# Patient Record
Sex: FEMALE | Race: WHITE | Employment: FULL TIME | URBAN - METROPOLITAN AREA
[De-identification: names, ages, dates, MRNs, and addresses within clinical notes are randomized per-mention and may not be internally consistent; named-entity substitution may affect disease eponyms.]

---

## 2017-02-02 ENCOUNTER — TELEPHONE (OUTPATIENT)
Dept: GENERAL MEDICINE | Facility: HOSPITAL | Age: 62
End: 2017-02-02

## 2017-02-06 ENCOUNTER — PRIOR ORIGINAL RECORDS (OUTPATIENT)
Dept: OTHER | Age: 62
End: 2017-02-06

## 2017-02-06 ENCOUNTER — OFFICE VISIT (OUTPATIENT)
Dept: OBGYN CLINIC | Facility: CLINIC | Age: 62
End: 2017-02-06

## 2017-02-06 ENCOUNTER — APPOINTMENT (OUTPATIENT)
Dept: LAB | Facility: REFERENCE LAB | Age: 62
End: 2017-02-06
Attending: FAMILY MEDICINE
Payer: COMMERCIAL

## 2017-02-06 VITALS
BODY MASS INDEX: 25.48 KG/M2 | TEMPERATURE: 98 F | RESPIRATION RATE: 16 BRPM | WEIGHT: 172 LBS | HEART RATE: 80 BPM | SYSTOLIC BLOOD PRESSURE: 128 MMHG | DIASTOLIC BLOOD PRESSURE: 72 MMHG | HEIGHT: 69 IN

## 2017-02-06 DIAGNOSIS — E03.9 HYPOTHYROIDISM, UNSPECIFIED TYPE: ICD-10-CM

## 2017-02-06 DIAGNOSIS — E55.9 VITAMIN D DEFICIENCY: ICD-10-CM

## 2017-02-06 DIAGNOSIS — I47.9 TACHYCARDIA, PAROXYSMAL (HCC): ICD-10-CM

## 2017-02-06 DIAGNOSIS — H81.11 BPPV (BENIGN PAROXYSMAL POSITIONAL VERTIGO), RIGHT: Primary | ICD-10-CM

## 2017-02-06 DIAGNOSIS — E78.2 MIXED HYPERLIPIDEMIA: ICD-10-CM

## 2017-02-06 DIAGNOSIS — E11.9 TYPE 2 DIABETES MELLITUS WITHOUT COMPLICATION, WITHOUT LONG-TERM CURRENT USE OF INSULIN (HCC): ICD-10-CM

## 2017-02-06 DIAGNOSIS — M16.6 OTHER SECONDARY OSTEOARTHRITIS OF BOTH HIPS: ICD-10-CM

## 2017-02-06 LAB
ALBUMIN SERPL BCP-MCNC: 4.1 G/DL (ref 3.5–4.8)
ALBUMIN/GLOB SERPL: 1.1 {RATIO} (ref 1–2)
ALP SERPL-CCNC: 105 U/L (ref 32–100)
ALT SERPL-CCNC: 45 U/L (ref 14–54)
ANION GAP SERPL CALC-SCNC: 11 MMOL/L (ref 0–18)
AST SERPL-CCNC: 25 U/L (ref 15–41)
BILIRUB SERPL-MCNC: 0.7 MG/DL (ref 0.3–1.2)
BUN SERPL-MCNC: 20 MG/DL (ref 8–20)
BUN/CREAT SERPL: 31.3 (ref 10–20)
CALCIUM SERPL-MCNC: 9.7 MG/DL (ref 8.5–10.5)
CHLORIDE SERPL-SCNC: 102 MMOL/L (ref 95–110)
CHOLEST SERPL-MCNC: 250 MG/DL (ref 110–200)
CO2 SERPL-SCNC: 25 MMOL/L (ref 22–32)
CREAT SERPL-MCNC: 0.64 MG/DL (ref 0.5–1.5)
GLOBULIN PLAS-MCNC: 3.6 G/DL (ref 2.5–3.7)
GLUCOSE SERPL-MCNC: 113 MG/DL (ref 70–99)
HDLC SERPL-MCNC: 69 MG/DL
LDLC SERPL CALC-MCNC: 149 MG/DL (ref 0–99)
NONHDLC SERPL-MCNC: 181 MG/DL
OSMOLALITY UR CALC.SUM OF ELEC: 289 MOSM/KG (ref 275–295)
POTASSIUM SERPL-SCNC: 3.9 MMOL/L (ref 3.3–5.1)
PROT SERPL-MCNC: 7.7 G/DL (ref 5.9–8.4)
SODIUM SERPL-SCNC: 138 MMOL/L (ref 136–144)
T3FREE SERPL-MCNC: 5.05 PG/ML (ref 2.53–4.29)
T4 FREE SERPL-MCNC: 0.69 NG/DL (ref 0.58–1.64)
TRIGL SERPL-MCNC: 159 MG/DL (ref 1–149)
TSH SERPL-ACNC: 0.47 UIU/ML (ref 0.34–5.6)

## 2017-02-06 PROCEDURE — 82306 VITAMIN D 25 HYDROXY: CPT

## 2017-02-06 PROCEDURE — 80053 COMPREHEN METABOLIC PANEL: CPT

## 2017-02-06 PROCEDURE — 84439 ASSAY OF FREE THYROXINE: CPT

## 2017-02-06 PROCEDURE — 80061 LIPID PANEL: CPT

## 2017-02-06 PROCEDURE — 36415 COLL VENOUS BLD VENIPUNCTURE: CPT

## 2017-02-06 PROCEDURE — 84481 FREE ASSAY (FT-3): CPT

## 2017-02-06 PROCEDURE — 83036 HEMOGLOBIN GLYCOSYLATED A1C: CPT

## 2017-02-06 PROCEDURE — 84443 ASSAY THYROID STIM HORMONE: CPT

## 2017-02-06 PROCEDURE — 99203 OFFICE O/P NEW LOW 30 MIN: CPT | Performed by: FAMILY MEDICINE

## 2017-02-06 RX ORDER — GLUCOSAMINE HCL 500 MG
TABLET ORAL
COMMUNITY
End: 2017-02-06 | Stop reason: CLARIF

## 2017-02-06 RX ORDER — THYROID,PORK 90 MG
TABLET ORAL
Refills: 3 | COMMUNITY
Start: 2017-01-06 | End: 2017-02-08

## 2017-02-06 RX ORDER — FLUOXETINE HYDROCHLORIDE 40 MG/1
CAPSULE ORAL
Refills: 3 | COMMUNITY
Start: 2017-01-07 | End: 2018-01-05 | Stop reason: CLARIF

## 2017-02-06 NOTE — PROGRESS NOTES
CC:  Patient presents with:  New Patient: discuss medical problems, having dizziness. HPI: 57year old female with hx of DM, HLD, hypothyroidism, arthritis, and vertigo here to establish care and discuss dizziness.   Notes she had an inner ear issue la rashes  Neuro: vertigo, bilateral tinnitus, no headaches, no weakness     Past Medical History   Diagnosis Date   • Hyperlipidemia    • Diabetes ([address]    • Thyroid disease    • Hypertension    • Pre-diabetes    • Arthritis      hips   • Spinal stenosis supple, left TM retracted, no tonsillar erythema or exudate, no lymphadenopathy, no thyroid masses   Cardio:  RRR, no murmurs, S1, S2  Pulmonary:  Clear bilaterally, good air entry  Dermatologic:  No rashes or lesions  Neuro: CN II-XII intact, 5/5 muscle s pending lab results, sooner as needed.      Darvin Ahumada, DO  02/06/2017  1:44 PM

## 2017-02-07 ENCOUNTER — HOSPITAL ENCOUNTER (OUTPATIENT)
Dept: GENERAL RADIOLOGY | Age: 62
Discharge: HOME | End: 2017-02-07
Attending: FAMILY MEDICINE
Payer: COMMERCIAL

## 2017-02-07 DIAGNOSIS — M16.6 OTHER SECONDARY OSTEOARTHRITIS OF BOTH HIPS: ICD-10-CM

## 2017-02-07 LAB — HBA1C MFR BLD: 5.9 % (ref 4–6)

## 2017-02-07 PROCEDURE — 73521 X-RAY EXAM HIPS BI 2 VIEWS: CPT

## 2017-02-08 ENCOUNTER — TELEPHONE (OUTPATIENT)
Dept: OBGYN CLINIC | Facility: CLINIC | Age: 62
End: 2017-02-08

## 2017-02-08 DIAGNOSIS — E11.9 TYPE 2 DIABETES MELLITUS WITHOUT COMPLICATION, WITHOUT LONG-TERM CURRENT USE OF INSULIN (HCC): ICD-10-CM

## 2017-02-08 DIAGNOSIS — M16.11 PRIMARY OSTEOARTHRITIS OF RIGHT HIP: ICD-10-CM

## 2017-02-08 DIAGNOSIS — M16.12 PRIMARY OSTEOARTHRITIS OF LEFT HIP: ICD-10-CM

## 2017-02-08 DIAGNOSIS — R40.0 DAYTIME SOMNOLENCE: Primary | ICD-10-CM

## 2017-02-08 DIAGNOSIS — E03.9 HYPOTHYROIDISM, UNSPECIFIED TYPE: ICD-10-CM

## 2017-02-08 LAB — 25(OH)D3 SERPL-MCNC: 45.8 NG/ML

## 2017-02-08 RX ORDER — LEVOTHYROXINE AND LIOTHYRONINE 38; 9 UG/1; UG/1
60 TABLET ORAL DAILY
Qty: 60 TABLET | Refills: 1 | Status: SHIPPED | OUTPATIENT
Start: 2017-02-08 | End: 2017-06-14

## 2017-02-08 RX ORDER — ATORVASTATIN CALCIUM 10 MG/1
10 TABLET, FILM COATED ORAL NIGHTLY
Qty: 30 TABLET | Refills: 3 | Status: SHIPPED | OUTPATIENT
Start: 2017-02-08 | End: 2017-06-14

## 2017-02-09 ENCOUNTER — HOSPITAL ENCOUNTER (OUTPATIENT)
Dept: CV DIAGNOSTICS | Facility: HOSPITAL | Age: 62
Discharge: HOME | End: 2017-02-09
Attending: FAMILY MEDICINE
Payer: COMMERCIAL

## 2017-02-09 DIAGNOSIS — I47.9 TACHYCARDIA, PAROXYSMAL (HCC): ICD-10-CM

## 2017-02-09 PROCEDURE — 93227 XTRNL ECG REC<48 HR R&I: CPT | Performed by: INTERNAL MEDICINE

## 2017-02-10 ENCOUNTER — HOSPITAL ENCOUNTER (OUTPATIENT)
Dept: CV DIAGNOSTICS | Facility: HOSPITAL | Age: 62
Discharge: HOME | End: 2017-02-10
Attending: FAMILY MEDICINE
Payer: COMMERCIAL

## 2017-02-10 PROCEDURE — 93225 XTRNL ECG REC<48 HRS REC: CPT

## 2017-02-15 ENCOUNTER — OFFICE VISIT (OUTPATIENT)
Dept: OBGYN CLINIC | Facility: CLINIC | Age: 62
End: 2017-02-15

## 2017-02-15 VITALS
TEMPERATURE: 98 F | SYSTOLIC BLOOD PRESSURE: 134 MMHG | HEIGHT: 69 IN | WEIGHT: 173 LBS | RESPIRATION RATE: 16 BRPM | BODY MASS INDEX: 25.62 KG/M2 | DIASTOLIC BLOOD PRESSURE: 74 MMHG | HEART RATE: 108 BPM

## 2017-02-15 DIAGNOSIS — M16.0 OSTEOARTHRITIS OF BOTH HIPS, UNSPECIFIED OSTEOARTHRITIS TYPE: Primary | ICD-10-CM

## 2017-02-15 DIAGNOSIS — I47.9 TACHYCARDIA, PAROXYSMAL (HCC): ICD-10-CM

## 2017-02-15 PROCEDURE — 99213 OFFICE O/P EST LOW 20 MIN: CPT | Performed by: FAMILY MEDICINE

## 2017-02-15 RX ORDER — NAPROXEN 500 MG/1
500 TABLET ORAL 2 TIMES DAILY WITH MEALS
Qty: 28 TABLET | Refills: 0 | Status: SHIPPED | OUTPATIENT
Start: 2017-02-15 | End: 2017-03-01

## 2017-02-15 NOTE — PROGRESS NOTES
CC:  Patient presents with: Other: discuss heart monitor, needs work note, and having hip pain on both sides      HPI: 57year old female here to discuss Holter monitor, bilateral hip pain with known DJD, and needing note for work.   Notes during the [facility] THYROID) 60 MG Oral Tab Take 1 tablet (60 mg total) by mouth daily. Disp: 60 tablet Rfl: 1   MetFORMIN HCl 500 MG Oral Tab Take 1 tablet (500 mg total) by mouth 2 (two) times daily with meals.  Disp: 180 tablet Rfl: 1   Atorvastatin Calcium 10 MG Oral Tab T provided to allow patient to be on medical leave pending evaluation by Dr. Giuliana Montanez in one week, and also emailed to her boss as requested     2.  Tachycardia, paroxysmal (HCC)    - Holter monitor testing normal, but given patient with paroxysmal tachycardia a

## 2017-03-17 ENCOUNTER — LAB ENCOUNTER (OUTPATIENT)
Dept: LAB | Facility: HOSPITAL | Age: 62
End: 2017-03-17
Attending: ORTHOPAEDIC SURGERY
Payer: COMMERCIAL

## 2017-03-17 ENCOUNTER — PRIOR ORIGINAL RECORDS (OUTPATIENT)
Dept: OTHER | Age: 62
End: 2017-03-17

## 2017-03-17 ENCOUNTER — HOSPITAL ENCOUNTER (OUTPATIENT)
Dept: GENERAL RADIOLOGY | Facility: HOSPITAL | Age: 62
Discharge: HOME | End: 2017-03-17
Attending: ORTHOPAEDIC SURGERY
Payer: COMMERCIAL

## 2017-03-17 ENCOUNTER — MYAURORA ACCOUNT LINK (OUTPATIENT)
Dept: OTHER | Age: 62
End: 2017-03-17

## 2017-03-17 DIAGNOSIS — M25.552 PAIN IN LEFT HIP: ICD-10-CM

## 2017-03-17 DIAGNOSIS — M16.12 UNILATERAL PRIMARY OSTEOARTHRITIS, LEFT HIP: ICD-10-CM

## 2017-03-17 DIAGNOSIS — R26.9 GAIT DISTURBANCE: Primary | ICD-10-CM

## 2017-03-17 DIAGNOSIS — M16.11 UNILATERAL PRIMARY OSTEOARTHRITIS, RIGHT HIP: ICD-10-CM

## 2017-03-17 DIAGNOSIS — M25.551 PAIN IN RIGHT HIP: ICD-10-CM

## 2017-03-17 DIAGNOSIS — Z01.818 PRE-OP TESTING: ICD-10-CM

## 2017-03-17 PROCEDURE — 71020 XR CHEST PA + LAT CHEST (CPT=71020): CPT

## 2017-03-24 LAB
ALBUMIN: 4.1 G/DL
ALKALINE PHOSPHATATE(ALK PHOS): 105 IU/L
ALT (SGPT): 45 U/L
AST (SGOT): 25 U/L
BILIRUBIN TOTAL: 0.7 MG/DL
BUN: 20 MG/DL
CALCIUM: 9.7 MG/DL
CHLORIDE: 102 MEQ/L
CHOLESTEROL, TOTAL: 250 MG/DL
CREATININE, SERUM: 0.64 MG/DL
GLOBULIN: 3.6 G/DL
GLUCOSE: 113 MG/DL
GLUCOSE: 113 MG/DL
HDL CHOLESTEROL: 69 MG/DL
HEMOGLOBIN A1C: 5.9 %
LDL CHOLESTEROL: 149 MG/DL
POTASSIUM, SERUM: 3.9 MEQ/L
PROTEIN, TOTAL: 7.7 G/DL
SGOT (AST): 25 IU/L
SGPT (ALT): 45 IU/L
SODIUM: 138 MEQ/L
THYROID STIMULATING HORMONE: 0.47 MLU/L
TRIGLYCERIDES: 159 MG/DL

## 2017-03-28 ENCOUNTER — MYAURORA ACCOUNT LINK (OUTPATIENT)
Dept: OTHER | Age: 62
End: 2017-03-28

## 2017-03-28 ENCOUNTER — PRIOR ORIGINAL RECORDS (OUTPATIENT)
Dept: OTHER | Age: 62
End: 2017-03-28

## 2017-03-29 ENCOUNTER — OFFICE VISIT (OUTPATIENT)
Dept: FAMILY MEDICINE CLINIC | Facility: CLINIC | Age: 62
End: 2017-03-29

## 2017-03-29 ENCOUNTER — LAB ENCOUNTER (OUTPATIENT)
Dept: LAB | Facility: REFERENCE LAB | Age: 62
End: 2017-03-29
Attending: FAMILY MEDICINE
Payer: COMMERCIAL

## 2017-03-29 ENCOUNTER — PRIOR ORIGINAL RECORDS (OUTPATIENT)
Dept: OTHER | Age: 62
End: 2017-03-29

## 2017-03-29 VITALS
TEMPERATURE: 99 F | HEART RATE: 71 BPM | SYSTOLIC BLOOD PRESSURE: 124 MMHG | OXYGEN SATURATION: 97 % | HEIGHT: 69 IN | WEIGHT: 174 LBS | DIASTOLIC BLOOD PRESSURE: 76 MMHG | BODY MASS INDEX: 25.77 KG/M2 | RESPIRATION RATE: 18 BRPM

## 2017-03-29 DIAGNOSIS — E78.2 MIXED HYPERLIPIDEMIA: ICD-10-CM

## 2017-03-29 DIAGNOSIS — E03.9 HYPOTHYROIDISM, UNSPECIFIED TYPE: ICD-10-CM

## 2017-03-29 DIAGNOSIS — M16.11 UNILATERAL PRIMARY OSTEOARTHRITIS, RIGHT HIP: ICD-10-CM

## 2017-03-29 DIAGNOSIS — F33.41 RECURRENT MAJOR DEPRESSIVE DISORDER, IN PARTIAL REMISSION (HCC): ICD-10-CM

## 2017-03-29 DIAGNOSIS — E11.9 CONTROLLED TYPE 2 DIABETES MELLITUS WITHOUT COMPLICATION, WITHOUT LONG-TERM CURRENT USE OF INSULIN (HCC): ICD-10-CM

## 2017-03-29 DIAGNOSIS — I10 HYPERTENSION WITH GOAL BLOOD PRESSURE LESS THAN 140/90: ICD-10-CM

## 2017-03-29 DIAGNOSIS — M16.0 OSTEOARTHRITIS OF BOTH HIPS, UNSPECIFIED OSTEOARTHRITIS TYPE: ICD-10-CM

## 2017-03-29 DIAGNOSIS — M25.551 PAIN IN RIGHT HIP: ICD-10-CM

## 2017-03-29 DIAGNOSIS — M16.12 UNILATERAL PRIMARY OSTEOARTHRITIS, LEFT HIP: ICD-10-CM

## 2017-03-29 DIAGNOSIS — Z01.818 PREOPERATIVE CLEARANCE: Primary | ICD-10-CM

## 2017-03-29 DIAGNOSIS — I47.9 PAROXYSMAL TACHYCARDIA (HCC): ICD-10-CM

## 2017-03-29 DIAGNOSIS — R26.9 GAIT DISTURBANCE: ICD-10-CM

## 2017-03-29 DIAGNOSIS — M25.552 PAIN IN LEFT HIP: ICD-10-CM

## 2017-03-29 PROBLEM — E78.5 HYPERLIPIDEMIA: Status: ACTIVE | Noted: 2017-03-29

## 2017-03-29 PROBLEM — M16.9 DEGENERATIVE JOINT DISEASE (DJD) OF HIP: Status: ACTIVE | Noted: 2017-03-29

## 2017-03-29 PROBLEM — F32.A DEPRESSION: Status: ACTIVE | Noted: 2017-03-29

## 2017-03-29 LAB
ALBUMIN SERPL BCP-MCNC: 3.8 G/DL (ref 3.5–4.8)
ALP SERPL-CCNC: 99 U/L (ref 32–100)
ALT SERPL-CCNC: 22 U/L (ref 14–54)
ANION GAP SERPL CALC-SCNC: 10 MMOL/L (ref 0–18)
APTT PPP: 25.9 SECONDS (ref 23.2–35.3)
AST SERPL-CCNC: 18 U/L (ref 15–41)
BASOPHILS # BLD: 0 K/UL (ref 0–0.2)
BASOPHILS NFR BLD: 1 %
BILIRUB DIRECT SERPL-MCNC: 0 MG/DL (ref 0–0.2)
BILIRUB SERPL-MCNC: 0.5 MG/DL (ref 0.3–1.2)
BILIRUB UR QL: NEGATIVE
BUN SERPL-MCNC: 14 MG/DL (ref 8–20)
BUN/CREAT SERPL: 23 (ref 10–20)
CALCIUM SERPL-MCNC: 9.8 MG/DL (ref 8.5–10.5)
CHLORIDE SERPL-SCNC: 104 MMOL/L (ref 95–110)
CO2 SERPL-SCNC: 25 MMOL/L (ref 22–32)
COLOR UR: YELLOW
CREAT SERPL-MCNC: 0.61 MG/DL (ref 0.5–1.5)
CRP SERPL-MCNC: 0.6 MG/DL (ref 0–0.9)
EOSINOPHIL # BLD: 0.2 K/UL (ref 0–0.7)
EOSINOPHIL NFR BLD: 2 %
ERYTHROCYTE [DISTWIDTH] IN BLOOD BY AUTOMATED COUNT: 13.1 % (ref 11–15)
ERYTHROCYTE [SEDIMENTATION RATE] IN BLOOD: 9 MM/HR (ref 0–30)
GLUCOSE SERPL-MCNC: 117 MG/DL (ref 70–99)
GLUCOSE UR-MCNC: NEGATIVE MG/DL
HCT VFR BLD AUTO: 44.1 % (ref 35–48)
HGB BLD-MCNC: 14.8 G/DL (ref 12–16)
HGB UR QL STRIP.AUTO: NEGATIVE
KETONES UR-MCNC: NEGATIVE MG/DL
LYMPHOCYTES # BLD: 2.3 K/UL (ref 1–4)
LYMPHOCYTES NFR BLD: 29 %
MCH RBC QN AUTO: 30.8 PG (ref 27–32)
MCHC RBC AUTO-ENTMCNC: 33.7 G/DL (ref 32–37)
MCV RBC AUTO: 91.4 FL (ref 80–100)
MONOCYTES # BLD: 0.5 K/UL (ref 0–1)
MONOCYTES NFR BLD: 6 %
NEUTROPHILS # BLD AUTO: 5 K/UL (ref 1.8–7.7)
NEUTROPHILS NFR BLD: 63 %
NITRITE UR QL STRIP.AUTO: NEGATIVE
OSMOLALITY UR CALC.SUM OF ELEC: 290 MOSM/KG (ref 275–295)
PH UR: 5 [PH] (ref 5–8)
PLATELET # BLD AUTO: 276 K/UL (ref 140–400)
PMV BLD AUTO: 8.5 FL (ref 7.4–10.3)
POTASSIUM SERPL-SCNC: 4.4 MMOL/L (ref 3.3–5.1)
PROT SERPL-MCNC: 7.1 G/DL (ref 5.9–8.4)
PROT UR-MCNC: NEGATIVE MG/DL
RBC # BLD AUTO: 4.82 M/UL (ref 3.7–5.4)
RBC #/AREA URNS AUTO: 2 /HPF
SODIUM SERPL-SCNC: 139 MMOL/L (ref 136–144)
SP GR UR STRIP: 1.02 (ref 1–1.03)
T3FREE SERPL-MCNC: 3.68 PG/ML (ref 2.53–4.29)
T4 FREE SERPL-MCNC: 0.65 NG/DL (ref 0.58–1.64)
TSH SERPL-ACNC: 1.91 UIU/ML (ref 0.34–5.6)
UROBILINOGEN UR STRIP-ACNC: <2
VIT C UR-MCNC: 40 MG/DL
WBC # BLD AUTO: 7.9 K/UL (ref 4–11)
WBC #/AREA URNS AUTO: 4 /HPF

## 2017-03-29 PROCEDURE — 80076 HEPATIC FUNCTION PANEL: CPT

## 2017-03-29 PROCEDURE — 84439 ASSAY OF FREE THYROXINE: CPT

## 2017-03-29 PROCEDURE — 85610 PROTHROMBIN TIME: CPT

## 2017-03-29 PROCEDURE — 85730 THROMBOPLASTIN TIME PARTIAL: CPT

## 2017-03-29 PROCEDURE — 85652 RBC SED RATE AUTOMATED: CPT

## 2017-03-29 PROCEDURE — 84481 FREE ASSAY (FT-3): CPT

## 2017-03-29 PROCEDURE — 87086 URINE CULTURE/COLONY COUNT: CPT

## 2017-03-29 PROCEDURE — 81001 URINALYSIS AUTO W/SCOPE: CPT

## 2017-03-29 PROCEDURE — 36415 COLL VENOUS BLD VENIPUNCTURE: CPT

## 2017-03-29 PROCEDURE — 86140 C-REACTIVE PROTEIN: CPT

## 2017-03-29 PROCEDURE — 99213 OFFICE O/P EST LOW 20 MIN: CPT | Performed by: FAMILY MEDICINE

## 2017-03-29 PROCEDURE — 83036 HEMOGLOBIN GLYCOSYLATED A1C: CPT

## 2017-03-29 PROCEDURE — 80048 BASIC METABOLIC PNL TOTAL CA: CPT

## 2017-03-29 PROCEDURE — 84443 ASSAY THYROID STIM HORMONE: CPT

## 2017-03-29 PROCEDURE — 85025 COMPLETE CBC W/AUTO DIFF WBC: CPT

## 2017-03-29 PROCEDURE — 87641 MR-STAPH DNA AMP PROBE: CPT

## 2017-03-29 RX ORDER — ATORVASTATIN CALCIUM 10 MG/1
10 TABLET, FILM COATED ORAL NIGHTLY
COMMUNITY
Start: 2017-02-18 | End: 2017-06-14

## 2017-03-29 NOTE — PROGRESS NOTES
CC:  Patient presents with:   Follow - Up  Pre-Op Exam: left first 4/17/17 at [facility] with Marika Swann, then will get right hip replacement      HPI: 57year old female here for pre-op physical for left hip replacement on 4/17 followed by right hip a mo Not on file    Alcohol Use: No    Drug Use: No    Sexual Activity: Not on file   Not on file  Other Topics Concern   None on file     Social History Narrative         Current Outpatient Prescriptions:  ONETOUCH ULTRA BLUE In Vitro Strip USE TO TEST ONCE DA lymphadenopathy   Cardio:  RRR, no murmurs, S1, S2  Pulmonary:  Clear bilaterally, good air entry  Dermatologic:  No rashes or lesions    EKG: Normal sinus rhythm, normal intervals, no ST or T wave changes. Normal EKG.     Assessment and Plan: 57year old f months for diabetes visit or sooner as needed.      Caridad Ortega DO  03/29/2017  1:15 PM

## 2017-03-30 LAB
HBA1C MFR BLD: 6 % (ref 4–6)
INR BLD: 1 (ref 0.9–1.2)
MRSA DNA SPEC QL NAA+PROBE: NEGATIVE
PROTHROMBIN TIME: 12.3 SECONDS (ref 11.8–14.5)

## 2017-04-03 ENCOUNTER — TELEPHONE (OUTPATIENT)
Dept: FAMILY MEDICINE CLINIC | Facility: CLINIC | Age: 62
End: 2017-04-03

## 2017-04-03 NOTE — TELEPHONE ENCOUNTER
Faxed H&P, blood work, and EKG to Dr. Kriss Clifton office for pre-surgical clearance for hip replacement surgeries scheduled 4/17 and 5/15.

## 2017-05-16 ENCOUNTER — MED REC SCAN ONLY (OUTPATIENT)
Dept: FAMILY MEDICINE CLINIC | Facility: CLINIC | Age: 62
End: 2017-05-16

## 2017-06-07 ENCOUNTER — APPOINTMENT (OUTPATIENT)
Dept: LAB | Facility: REFERENCE LAB | Age: 62
End: 2017-06-07
Attending: FAMILY MEDICINE
Payer: COMMERCIAL

## 2017-06-07 ENCOUNTER — OFFICE VISIT (OUTPATIENT)
Dept: FAMILY MEDICINE CLINIC | Facility: CLINIC | Age: 62
End: 2017-06-07

## 2017-06-07 ENCOUNTER — HOSPITAL ENCOUNTER (OUTPATIENT)
Dept: GENERAL RADIOLOGY | Age: 62
Discharge: HOME | End: 2017-06-07
Attending: FAMILY MEDICINE
Payer: COMMERCIAL

## 2017-06-07 VITALS
HEART RATE: 80 BPM | SYSTOLIC BLOOD PRESSURE: 110 MMHG | WEIGHT: 172 LBS | RESPIRATION RATE: 16 BRPM | BODY MASS INDEX: 25.48 KG/M2 | HEIGHT: 69 IN | OXYGEN SATURATION: 98 % | DIASTOLIC BLOOD PRESSURE: 68 MMHG

## 2017-06-07 DIAGNOSIS — M54.50 CHRONIC BILATERAL LOW BACK PAIN WITHOUT SCIATICA: ICD-10-CM

## 2017-06-07 DIAGNOSIS — G89.29 CHRONIC BILATERAL LOW BACK PAIN WITHOUT SCIATICA: ICD-10-CM

## 2017-06-07 DIAGNOSIS — Z01.818 PREOPERATIVE CLEARANCE: ICD-10-CM

## 2017-06-07 DIAGNOSIS — M16.11 OSTEOARTHRITIS OF RIGHT HIP, UNSPECIFIED OSTEOARTHRITIS TYPE: ICD-10-CM

## 2017-06-07 DIAGNOSIS — Z01.818 PREOPERATIVE CLEARANCE: Primary | ICD-10-CM

## 2017-06-07 DIAGNOSIS — R14.0 ABDOMINAL BLOATING: ICD-10-CM

## 2017-06-07 DIAGNOSIS — L84 CALLUS OF FOOT: ICD-10-CM

## 2017-06-07 PROCEDURE — 87081 CULTURE SCREEN ONLY: CPT | Performed by: FAMILY MEDICINE

## 2017-06-07 PROCEDURE — 72100 X-RAY EXAM L-S SPINE 2/3 VWS: CPT | Performed by: FAMILY MEDICINE

## 2017-06-07 PROCEDURE — 99213 OFFICE O/P EST LOW 20 MIN: CPT | Performed by: FAMILY MEDICINE

## 2017-06-07 PROCEDURE — 72220 X-RAY EXAM SACRUM TAILBONE: CPT | Performed by: FAMILY MEDICINE

## 2017-06-07 NOTE — PROGRESS NOTES
CC:  Patient presents with:  Medical Clearance (constitutional): 6/21/17 for right hip replacement   Low Back Pain: x1 week ago  Callus: right foot near fifth toe      HPI: 57year old female here for medical clearance for right hip replacement, with acute right foot callus but no plantar warts       Past Medical History   Diagnosis Date   • Hyperlipidemia    • Diabetes ([facility] [address]    • Thyroid disease    • Hypertension    • Arthritis      hips   • Spinal stenosis    • Vertigo    • Depression      Past Surgical Hi Probiotic Product (PROBIOTIC OR) Take by mouth. Disp:  Rfl:    metoprolol Tartrate 25 MG Oral Tab Take 25 mg by mouth 2 (two) times daily. Disp:  Rfl:    aspirin 81 MG Oral Chew Tab Chew by mouth daily.  Disp:  Rfl:    Vitamin B-12 500 MCG Oral Tab Take 5 with H&P  - MRSA Culture Only [E]; Future    2. Osteoarthritis of right hip, unspecified osteoarthritis type    - Proceed with right hip TKA on 6/21 as above and continue post-operative PT as directed     3.  Chronic bilateral low back pain without sciatica

## 2017-06-13 PROBLEM — M47.817 DJD (DEGENERATIVE JOINT DISEASE), LUMBOSACRAL: Status: ACTIVE | Noted: 2017-06-13

## 2017-06-14 ENCOUNTER — TELEPHONE (OUTPATIENT)
Dept: FAMILY MEDICINE CLINIC | Facility: CLINIC | Age: 62
End: 2017-06-14

## 2017-06-14 ENCOUNTER — APPOINTMENT (OUTPATIENT)
Dept: LAB | Facility: REFERENCE LAB | Age: 62
End: 2017-06-14
Attending: FAMILY MEDICINE
Payer: COMMERCIAL

## 2017-06-14 DIAGNOSIS — R14.0 ABDOMINAL BLOATING: ICD-10-CM

## 2017-06-14 DIAGNOSIS — Z80.49 FAMILY HISTORY OF MALIGNANT NEOPLASM OF GENITAL ORGAN: Primary | ICD-10-CM

## 2017-06-14 DIAGNOSIS — Z80.49 FAMILY HISTORY OF MALIGNANT NEOPLASM OF GENITAL ORGAN: ICD-10-CM

## 2017-06-14 PROCEDURE — 36415 COLL VENOUS BLD VENIPUNCTURE: CPT

## 2017-06-14 PROCEDURE — 86304 IMMUNOASSAY TUMOR CA 125: CPT

## 2017-06-14 RX ORDER — THYROID 60 MG
TABLET ORAL
Qty: 90 TABLET | Refills: 3 | Status: SHIPPED | OUTPATIENT
Start: 2017-06-14 | End: 2018-01-17

## 2017-06-14 RX ORDER — ATORVASTATIN CALCIUM 10 MG/1
TABLET, FILM COATED ORAL
Qty: 90 TABLET | Refills: 3 | Status: SHIPPED | OUTPATIENT
Start: 2017-06-14 | End: 2018-04-27

## 2017-06-14 NOTE — TELEPHONE ENCOUNTER
Patient spoke to insurance company and blood test to test for ovarian cancer would be covered and would like to get the test ordered.  Will call patient once lab is ordered

## 2017-06-21 LAB
ALBUMIN: 3.8 G/DL
ALKALINE PHOSPHATATE(ALK PHOS): 99 IU/L
ALT (SGPT): 22 U/L
AST (SGOT): 18 U/L
BILIRUBIN TOTAL: 0.5 MG/DL
BUN: 14 MG/DL
CALCIUM: 9.8 MG/DL
CHLORIDE: 104 MEQ/L
CREATININE, SERUM: 0.61 MG/DL
ESR (SED RATE): 9 MM/HR
FREE T4: 0.65 MG/DL
GLUCOSE: 117 MG/DL
GLUCOSE: 117 MG/DL
HEMATOCRIT: 44.1 %
HEMATOCRIT: 44.1 %
HEMOGLOBIN A1C: 6 %
HEMOGLOBIN: 14.8 G/DL
HEMOGLOBIN: 14.8 G/DL
INR: 1
PLATELETS: 276 K/UL
PLATELETS: 276 K/UL
POTASSIUM, SERUM: 4.4 MEQ/L
PROTEIN, TOTAL: 7.1 G/DL
PROTHROMBIN TIME: 12.3 SECONDS
RED BLOOD COUNT: 4.82 X 10-6/U
SGOT (AST): 18 IU/L
SGPT (ALT): 22 IU/L
SODIUM: 139 MEQ/L
THYROID STIMULATING HORMONE: 1.91 MLU/L
WHITE BLOOD COUNT: 7.9 X 10-3/U

## 2017-07-05 ENCOUNTER — TELEPHONE (OUTPATIENT)
Dept: OBGYN CLINIC | Facility: CLINIC | Age: 62
End: 2017-07-05

## 2017-07-05 NOTE — TELEPHONE ENCOUNTER
Left voicemail for patient notifying her that the exercises for BPPV are called Epley maneuvers and she should do them twice a day for at least 24 hours after her symptoms resolve.  If her symptoms are at all new or different or the exercises are not helpin

## 2017-07-11 ENCOUNTER — MED REC SCAN ONLY (OUTPATIENT)
Dept: FAMILY MEDICINE CLINIC | Facility: CLINIC | Age: 62
End: 2017-07-11

## 2017-08-16 ENCOUNTER — MED REC SCAN ONLY (OUTPATIENT)
Dept: FAMILY MEDICINE CLINIC | Facility: CLINIC | Age: 62
End: 2017-08-16

## 2017-10-03 ENCOUNTER — MED REC SCAN ONLY (OUTPATIENT)
Dept: FAMILY MEDICINE CLINIC | Facility: CLINIC | Age: 62
End: 2017-10-03

## 2017-11-13 ENCOUNTER — TELEPHONE (OUTPATIENT)
Dept: OBGYN CLINIC | Facility: CLINIC | Age: 62
End: 2017-11-13

## 2017-11-13 NOTE — TELEPHONE ENCOUNTER
Patient had both hips replaced in June and April and got a summons for jury duty and needs note to say she is unable to make the jury

## 2017-11-14 NOTE — TELEPHONE ENCOUNTER
Called patient and left her a message to call back and let us know how she would like to receive the letter.

## 2018-01-05 ENCOUNTER — APPOINTMENT (OUTPATIENT)
Dept: LAB | Facility: REFERENCE LAB | Age: 63
End: 2018-01-05
Attending: FAMILY MEDICINE
Payer: COMMERCIAL

## 2018-01-05 ENCOUNTER — OFFICE VISIT (OUTPATIENT)
Dept: FAMILY MEDICINE CLINIC | Facility: CLINIC | Age: 63
End: 2018-01-05

## 2018-01-05 ENCOUNTER — HOSPITAL ENCOUNTER (OUTPATIENT)
Dept: GENERAL RADIOLOGY | Age: 63
Discharge: HOME | End: 2018-01-05
Attending: FAMILY MEDICINE
Payer: COMMERCIAL

## 2018-01-05 VITALS
OXYGEN SATURATION: 97 % | WEIGHT: 180 LBS | HEART RATE: 71 BPM | DIASTOLIC BLOOD PRESSURE: 82 MMHG | BODY MASS INDEX: 26.66 KG/M2 | HEIGHT: 69 IN | SYSTOLIC BLOOD PRESSURE: 138 MMHG

## 2018-01-05 DIAGNOSIS — E03.9 HYPOTHYROIDISM, UNSPECIFIED TYPE: ICD-10-CM

## 2018-01-05 DIAGNOSIS — E78.2 MIXED HYPERLIPIDEMIA: ICD-10-CM

## 2018-01-05 DIAGNOSIS — Z23 NEED FOR VACCINATION: ICD-10-CM

## 2018-01-05 DIAGNOSIS — M25.561 BILATERAL KNEE PAIN: ICD-10-CM

## 2018-01-05 DIAGNOSIS — E11.9 CONTROLLED TYPE 2 DIABETES MELLITUS WITHOUT COMPLICATION, WITHOUT LONG-TERM CURRENT USE OF INSULIN (HCC): ICD-10-CM

## 2018-01-05 DIAGNOSIS — E55.9 VITAMIN D DEFICIENCY: ICD-10-CM

## 2018-01-05 DIAGNOSIS — M25.562 BILATERAL KNEE PAIN: ICD-10-CM

## 2018-01-05 DIAGNOSIS — Z11.59 ENCOUNTER FOR HEPATITIS C SCREENING TEST FOR LOW RISK PATIENT: ICD-10-CM

## 2018-01-05 DIAGNOSIS — I10 HYPERTENSION WITH GOAL BLOOD PRESSURE LESS THAN 140/90: ICD-10-CM

## 2018-01-05 DIAGNOSIS — M25.561 ACUTE PAIN OF BOTH KNEES: Primary | ICD-10-CM

## 2018-01-05 DIAGNOSIS — F33.41 RECURRENT MAJOR DEPRESSIVE DISORDER, IN PARTIAL REMISSION (HCC): ICD-10-CM

## 2018-01-05 DIAGNOSIS — M25.562 ACUTE PAIN OF BOTH KNEES: Primary | ICD-10-CM

## 2018-01-05 LAB
ALBUMIN SERPL BCP-MCNC: 4.4 G/DL (ref 3.5–4.8)
ALBUMIN/GLOB SERPL: 1.4 {RATIO} (ref 1–2)
ALP SERPL-CCNC: 95 U/L (ref 32–100)
ALT SERPL-CCNC: 31 U/L (ref 14–54)
ANION GAP SERPL CALC-SCNC: 9 MMOL/L (ref 0–18)
AST SERPL-CCNC: 22 U/L (ref 15–41)
BILIRUB SERPL-MCNC: 0.5 MG/DL (ref 0.3–1.2)
BUN SERPL-MCNC: 16 MG/DL (ref 8–20)
BUN/CREAT SERPL: 29.6 (ref 10–20)
CALCIUM SERPL-MCNC: 9.9 MG/DL (ref 8.5–10.5)
CHLORIDE SERPL-SCNC: 102 MMOL/L (ref 95–110)
CHOLEST SERPL-MCNC: 299 MG/DL (ref 110–200)
CO2 SERPL-SCNC: 26 MMOL/L (ref 22–32)
CREAT SERPL-MCNC: 0.54 MG/DL (ref 0.5–1.5)
GLOBULIN PLAS-MCNC: 3.1 G/DL (ref 2.5–3.7)
GLUCOSE SERPL-MCNC: 111 MG/DL (ref 70–99)
HDLC SERPL-MCNC: 62 MG/DL
LDLC SERPL CALC-MCNC: 202 MG/DL (ref 0–99)
NONHDLC SERPL-MCNC: 237 MG/DL
OSMOLALITY UR CALC.SUM OF ELEC: 286 MOSM/KG (ref 275–295)
POTASSIUM SERPL-SCNC: 4.4 MMOL/L (ref 3.3–5.1)
PROT SERPL-MCNC: 7.5 G/DL (ref 5.9–8.4)
SODIUM SERPL-SCNC: 137 MMOL/L (ref 136–144)
T3FREE SERPL-MCNC: 3.82 PG/ML (ref 2.53–4.29)
T4 FREE SERPL-MCNC: 0.74 NG/DL (ref 0.58–1.64)
TRIGL SERPL-MCNC: 174 MG/DL (ref 1–149)
TSH SERPL-ACNC: 5.74 UIU/ML (ref 0.45–5.33)

## 2018-01-05 PROCEDURE — 99214 OFFICE O/P EST MOD 30 MIN: CPT | Performed by: FAMILY MEDICINE

## 2018-01-05 PROCEDURE — 80053 COMPREHEN METABOLIC PANEL: CPT | Performed by: FAMILY MEDICINE

## 2018-01-05 PROCEDURE — 73560 X-RAY EXAM OF KNEE 1 OR 2: CPT | Performed by: FAMILY MEDICINE

## 2018-01-05 PROCEDURE — 36415 COLL VENOUS BLD VENIPUNCTURE: CPT | Performed by: FAMILY MEDICINE

## 2018-01-05 PROCEDURE — 82306 VITAMIN D 25 HYDROXY: CPT | Performed by: FAMILY MEDICINE

## 2018-01-05 PROCEDURE — 90732 PPSV23 VACC 2 YRS+ SUBQ/IM: CPT | Performed by: FAMILY MEDICINE

## 2018-01-05 PROCEDURE — 84439 ASSAY OF FREE THYROXINE: CPT | Performed by: FAMILY MEDICINE

## 2018-01-05 PROCEDURE — 86803 HEPATITIS C AB TEST: CPT | Performed by: FAMILY MEDICINE

## 2018-01-05 PROCEDURE — 73565 X-RAY EXAM OF KNEES: CPT | Performed by: FAMILY MEDICINE

## 2018-01-05 PROCEDURE — 83036 HEMOGLOBIN GLYCOSYLATED A1C: CPT | Performed by: FAMILY MEDICINE

## 2018-01-05 PROCEDURE — 90471 IMMUNIZATION ADMIN: CPT | Performed by: FAMILY MEDICINE

## 2018-01-05 PROCEDURE — 80061 LIPID PANEL: CPT | Performed by: FAMILY MEDICINE

## 2018-01-05 PROCEDURE — 84481 FREE ASSAY (FT-3): CPT | Performed by: FAMILY MEDICINE

## 2018-01-05 PROCEDURE — 84443 ASSAY THYROID STIM HORMONE: CPT | Performed by: FAMILY MEDICINE

## 2018-01-05 RX ORDER — FLUOXETINE HYDROCHLORIDE 20 MG/1
20 CAPSULE ORAL 2 TIMES DAILY
Qty: 180 CAPSULE | Refills: 3 | Status: SHIPPED | OUTPATIENT
Start: 2018-01-05 | End: 2018-11-29

## 2018-01-05 RX ORDER — ASPIRIN 81 MG/1
81 TABLET, CHEWABLE ORAL DAILY
Qty: 90 TABLET | Refills: 3 | Status: SHIPPED | OUTPATIENT
Start: 2018-01-05 | End: 2019-01-05

## 2018-01-05 NOTE — PROGRESS NOTES
CC:  Patient presents with:  Medication Follow-Up: needs refills       HPI: 60year old female with history of Type 2 DM, HTN, HLD, and hypothyroidism here for medication follow-up and refills.   Notes severe bilateral knee pain since her hip surgeries, not Rfl: 0   FLUoxetine HCl 40 MG Oral Cap TK 1 C PO QAM Disp:  Rfl: 3   Verapamil HCl  MG Oral Tab CR TK 1 T PO QD Disp:  Rfl: 2   Phytonadione (VITAMIN K OR) Take by mouth. Disp:  Rfl:    Omega-3 Fatty Acids (FISH OIL OR) Take by mouth.  Disp:  Rfl: current use of insulin (HCC)    - Refill of Metformin provided today, up to date with diabetic eye exam  - Need to check microalbumin:Cr ratio at next visit and consider ACE or ARB if microalbuminuria present  - Continue Metformin 1000 mg twice daily pendi

## 2018-01-06 LAB — HBA1C MFR BLD: 6.1 % (ref 4–6)

## 2018-01-08 LAB — HCV AB SERPL QL IA: NONREACTIVE

## 2018-01-10 LAB — 25(OH)D3 SERPL-MCNC: 23.2 NG/ML

## 2018-01-17 ENCOUNTER — PATIENT MESSAGE (OUTPATIENT)
Dept: FAMILY MEDICINE CLINIC | Facility: CLINIC | Age: 63
End: 2018-01-17

## 2018-01-17 DIAGNOSIS — E03.9 HYPOTHYROIDISM, UNSPECIFIED TYPE: Primary | ICD-10-CM

## 2018-01-17 RX ORDER — LEVOTHYROXINE AND LIOTHYRONINE 38; 9 UG/1; UG/1
TABLET ORAL
Qty: 90 TABLET | Refills: 0 | Status: SHIPPED | OUTPATIENT
Start: 2018-01-17 | End: 2018-04-10

## 2018-01-17 RX ORDER — LEVOTHYROXINE AND LIOTHYRONINE 9.5; 2.25 UG/1; UG/1
TABLET ORAL
Qty: 90 TABLET | Refills: 0 | Status: SHIPPED | OUTPATIENT
Start: 2018-01-17 | End: 2018-04-10

## 2018-01-18 NOTE — TELEPHONE ENCOUNTER
From: Lavell Sylvester  To: Chelsea Russo DO  Sent: 1/17/2018 12:01 PM CST  Subject: Prescription Question    Hi Dr. Dina Art:  After reading the results of my tests, here are my thought:  1) Knees will be put on a back burner until such time as they are inter

## 2018-04-10 RX ORDER — THYROID,PORK 15 MG
TABLET ORAL
Qty: 30 TABLET | Refills: 0 | Status: SHIPPED | OUTPATIENT
Start: 2018-04-10 | End: 2018-04-30

## 2018-04-10 RX ORDER — THYROID 60 MG
TABLET ORAL
Qty: 30 TABLET | Refills: 0 | Status: SHIPPED | OUTPATIENT
Start: 2018-04-10 | End: 2018-04-30

## 2018-04-16 RX ORDER — THYROID 15 MG/1
15 TABLET ORAL
Qty: 30 TABLET | Refills: 0 | Status: CANCELLED
Start: 2018-04-16

## 2018-04-16 RX ORDER — THYROID 60 MG/1
TABLET ORAL
Qty: 30 TABLET | Refills: 0 | Status: CANCELLED
Start: 2018-04-16

## 2018-04-27 ENCOUNTER — OFFICE VISIT (OUTPATIENT)
Dept: FAMILY MEDICINE CLINIC | Facility: CLINIC | Age: 63
End: 2018-04-27

## 2018-04-27 ENCOUNTER — APPOINTMENT (OUTPATIENT)
Dept: LAB | Facility: REFERENCE LAB | Age: 63
End: 2018-04-27
Attending: FAMILY MEDICINE
Payer: COMMERCIAL

## 2018-04-27 VITALS
BODY MASS INDEX: 27.11 KG/M2 | OXYGEN SATURATION: 98 % | WEIGHT: 183 LBS | DIASTOLIC BLOOD PRESSURE: 62 MMHG | HEIGHT: 69 IN | SYSTOLIC BLOOD PRESSURE: 118 MMHG | HEART RATE: 69 BPM

## 2018-04-27 DIAGNOSIS — E55.9 VITAMIN D DEFICIENCY: ICD-10-CM

## 2018-04-27 DIAGNOSIS — E11.9 CONTROLLED TYPE 2 DIABETES MELLITUS WITHOUT COMPLICATION, WITHOUT LONG-TERM CURRENT USE OF INSULIN (HCC): ICD-10-CM

## 2018-04-27 DIAGNOSIS — E11.9 CONTROLLED TYPE 2 DIABETES MELLITUS WITHOUT COMPLICATION, WITHOUT LONG-TERM CURRENT USE OF INSULIN (HCC): Primary | ICD-10-CM

## 2018-04-27 DIAGNOSIS — E78.2 MIXED HYPERLIPIDEMIA: ICD-10-CM

## 2018-04-27 DIAGNOSIS — E03.9 HYPOTHYROIDISM, UNSPECIFIED TYPE: ICD-10-CM

## 2018-04-27 DIAGNOSIS — E53.8 B12 DEFICIENCY: ICD-10-CM

## 2018-04-27 DIAGNOSIS — E53.1 VITAMIN B6 DEFICIENCY: ICD-10-CM

## 2018-04-27 PROCEDURE — 80061 LIPID PANEL: CPT | Performed by: FAMILY MEDICINE

## 2018-04-27 PROCEDURE — 99214 OFFICE O/P EST MOD 30 MIN: CPT | Performed by: FAMILY MEDICINE

## 2018-04-27 PROCEDURE — 84207 ASSAY OF VITAMIN B-6: CPT | Performed by: FAMILY MEDICINE

## 2018-04-27 PROCEDURE — 36415 COLL VENOUS BLD VENIPUNCTURE: CPT | Performed by: FAMILY MEDICINE

## 2018-04-27 PROCEDURE — 84443 ASSAY THYROID STIM HORMONE: CPT | Performed by: FAMILY MEDICINE

## 2018-04-27 PROCEDURE — 82570 ASSAY OF URINE CREATININE: CPT | Performed by: FAMILY MEDICINE

## 2018-04-27 PROCEDURE — 82306 VITAMIN D 25 HYDROXY: CPT | Performed by: FAMILY MEDICINE

## 2018-04-27 PROCEDURE — 82607 VITAMIN B-12: CPT | Performed by: FAMILY MEDICINE

## 2018-04-27 PROCEDURE — 82043 UR ALBUMIN QUANTITATIVE: CPT | Performed by: FAMILY MEDICINE

## 2018-04-27 RX ORDER — BETA-GLUCAN, (1-3) (1-4) 70 %
POWDER (GRAM) MISCELLANEOUS
COMMUNITY
End: 2019-05-01

## 2018-04-27 NOTE — PROGRESS NOTES
CC:  Patient presents with:  Thyroid Problem: follow up  Diabetes: follow up-needs blood work and urine microalbumin      HPI: 60year old female here to follow-up on hypothyroidism, HLD, and type 2 DM.   Has been taking beta glucans for her HLD and some pl Glucan Does not apply Powder by Does not apply route.  Disp:  Rfl:    ARMOUR THYROID 15 MG Oral Tab TAKE 1 TABLET BY MOUTH EVERY DAY Disp: 30 tablet Rfl: 0   ARMOUR THYROID 60 MG Oral Tab TAKE 1 TABLET BY MOUTH DAILY WITH 15MG TABLET Disp: 30 tablet Rfl: 0 [facility])    - Well-controlled based on last A1C in January, so no indication to repeat for another 3 months  - Continue Metformin twice daily and diabetic diet  - Will check microalbumin:Cr ratio today for routine monitoring   - MICROALB/CREAT RATIO, RANDOM U

## 2018-04-30 RX ORDER — LEVOTHYROXINE AND LIOTHYRONINE 38; 9 UG/1; UG/1
TABLET ORAL
Qty: 90 TABLET | Refills: 1 | Status: SHIPPED | OUTPATIENT
Start: 2018-04-30 | End: 2018-10-26

## 2018-04-30 RX ORDER — LEVOTHYROXINE AND LIOTHYRONINE 9.5; 2.25 UG/1; UG/1
TABLET ORAL
Qty: 90 TABLET | Refills: 1 | Status: SHIPPED | OUTPATIENT
Start: 2018-04-30 | End: 2018-10-26

## 2018-05-01 ENCOUNTER — PATIENT MESSAGE (OUTPATIENT)
Dept: FAMILY MEDICINE CLINIC | Facility: CLINIC | Age: 63
End: 2018-05-01

## 2018-05-01 RX ORDER — ROSUVASTATIN CALCIUM 5 MG/1
5 TABLET, COATED ORAL NIGHTLY
Qty: 90 TABLET | Refills: 1 | Status: SHIPPED | OUTPATIENT
Start: 2018-05-01 | End: 2018-05-10 | Stop reason: ALTCHOICE

## 2018-05-02 NOTE — TELEPHONE ENCOUNTER
From: Lavell Sylvester  To: Chelsea Russo DO  Sent: 5/1/2018 11:57 AM CDT  Subject: Prescription Question    I'm willing to revisit statins again. Please prescribe something with the least side effects.     Thanks,  Kofi Root

## 2018-05-09 ENCOUNTER — PATIENT MESSAGE (OUTPATIENT)
Dept: FAMILY MEDICINE CLINIC | Facility: CLINIC | Age: 63
End: 2018-05-09

## 2018-05-10 RX ORDER — PRAVASTATIN SODIUM 10 MG
10 TABLET ORAL NIGHTLY
Qty: 90 TABLET | Refills: 1 | Status: SHIPPED | OUTPATIENT
Start: 2018-05-10 | End: 2018-10-26

## 2018-05-10 NOTE — TELEPHONE ENCOUNTER
From: Sherrie Taylor  To: Chilton Castleman, DO  Sent: 5/9/2018 11:38 AM CDT  Subject: Test Results Question    Hi Dr. Borrero Fam:    I have several issues:  1) I have been taking the b6 faithfully, but not always letting it dissolve under my tongue.  Could this tim

## 2018-09-09 ENCOUNTER — TELEPHONE (OUTPATIENT)
Dept: FAMILY MEDICINE CLINIC | Facility: CLINIC | Age: 63
End: 2018-09-09

## 2018-09-10 ENCOUNTER — NURSE ONLY (OUTPATIENT)
Dept: FAMILY MEDICINE CLINIC | Facility: CLINIC | Age: 63
End: 2018-09-10
Payer: COMMERCIAL

## 2018-09-10 DIAGNOSIS — Z23 NEED FOR INFLUENZA VACCINATION: Primary | ICD-10-CM

## 2018-09-10 DIAGNOSIS — Z23 NEED FOR TDAP VACCINATION: ICD-10-CM

## 2018-09-10 PROCEDURE — 90471 IMMUNIZATION ADMIN: CPT | Performed by: FAMILY MEDICINE

## 2018-09-10 PROCEDURE — 90715 TDAP VACCINE 7 YRS/> IM: CPT | Performed by: FAMILY MEDICINE

## 2018-09-10 PROCEDURE — 90686 IIV4 VACC NO PRSV 0.5 ML IM: CPT | Performed by: FAMILY MEDICINE

## 2018-09-10 PROCEDURE — 90472 IMMUNIZATION ADMIN EACH ADD: CPT | Performed by: FAMILY MEDICINE

## 2018-09-10 NOTE — TELEPHONE ENCOUNTER
Patient called after stepping on some glass and cutting her foot earlier today. Reports the bleeding stopped and she cleaned it with hydrogen peroxide and has put antibiotic ointment on it with no concerns for infection.  Wondering if she needs an antibioti

## 2018-10-26 DIAGNOSIS — E03.9 HYPOTHYROIDISM, UNSPECIFIED TYPE: Primary | ICD-10-CM

## 2018-10-26 DIAGNOSIS — E11.9 CONTROLLED TYPE 2 DIABETES MELLITUS WITHOUT COMPLICATION, WITHOUT LONG-TERM CURRENT USE OF INSULIN (HCC): ICD-10-CM

## 2018-10-26 RX ORDER — THYROID 60 MG/1
TABLET ORAL
Qty: 90 TABLET | Refills: 1 | Status: SHIPPED | OUTPATIENT
Start: 2018-10-26 | End: 2019-03-15 | Stop reason: ALTCHOICE

## 2018-10-26 RX ORDER — PRAVASTATIN SODIUM 10 MG
TABLET ORAL
Qty: 90 TABLET | Refills: 0 | Status: SHIPPED | OUTPATIENT
Start: 2018-10-26 | End: 2019-01-06

## 2018-10-26 RX ORDER — THYROID 15 MG/1
TABLET ORAL
Qty: 90 TABLET | Refills: 1 | Status: SHIPPED | OUTPATIENT
Start: 2018-10-26 | End: 2019-03-15 | Stop reason: ALTCHOICE

## 2018-11-20 NOTE — TELEPHONE ENCOUNTER
Per Dr. Mariya Joseph, ok to refill Metformin one time. Pt to schedule a f/u appt. Attempted to contact pt to schedule and VM is full. Notified pharmacy to inform pt at p/u.

## 2018-11-29 RX ORDER — FLUOXETINE HYDROCHLORIDE 20 MG/1
CAPSULE ORAL
Qty: 60 CAPSULE | Refills: 0 | Status: SHIPPED | OUTPATIENT
Start: 2018-11-29 | End: 2018-11-30

## 2018-11-29 NOTE — TELEPHONE ENCOUNTER
Refill request for Fluoxetine rec'd for pt. Last appt was 4/27/18 and last refill was 1/5/18 for #180 with 3 refills. Msg sent to Dr. Ivon Chauhan.

## 2018-11-30 RX ORDER — FLUOXETINE HYDROCHLORIDE 20 MG/1
CAPSULE ORAL
Qty: 120 CAPSULE | Refills: 0 | Status: SHIPPED | OUTPATIENT
Start: 2018-11-30 | End: 2019-02-22

## 2018-11-30 NOTE — TELEPHONE ENCOUNTER
Informed pt that Fluoxetine was refilled for 30 days only as pt needs to make an appt. Per pt, she plans to schedule an appt for January as she is also due for a flu vaccine. Dr. Brendon Lubin is aware.

## 2019-01-08 RX ORDER — PRAVASTATIN SODIUM 10 MG
TABLET ORAL
Qty: 90 TABLET | Refills: 0 | Status: SHIPPED | OUTPATIENT
Start: 2019-01-08 | End: 2019-04-11

## 2019-02-25 RX ORDER — FLUOXETINE HYDROCHLORIDE 20 MG/1
CAPSULE ORAL
Qty: 60 CAPSULE | Refills: 0 | Status: SHIPPED | OUTPATIENT
Start: 2019-02-25 | End: 2019-02-25

## 2019-02-26 RX ORDER — FLUOXETINE HYDROCHLORIDE 20 MG/1
CAPSULE ORAL
Qty: 180 CAPSULE | Refills: 0 | Status: SHIPPED | OUTPATIENT
Start: 2019-02-26 | End: 2019-03-13

## 2019-02-26 NOTE — TELEPHONE ENCOUNTER
A refill request was received for:  Requested Prescriptions     Pending Prescriptions Disp Refills   • FLUOXETINE HCL 20 MG Oral Cap [Pharmacy Med Name: FLUOXETINE 20MG CAPSULES] 180 capsule 0     Sig: TAKE 1 CAPSULE(20 MG) BY MOUTH TWICE DAILY     Last re

## 2019-03-01 VITALS
DIASTOLIC BLOOD PRESSURE: 66 MMHG | WEIGHT: 174 LBS | SYSTOLIC BLOOD PRESSURE: 110 MMHG | BODY MASS INDEX: 25.77 KG/M2 | HEIGHT: 69 IN | OXYGEN SATURATION: 96 % | HEART RATE: 64 BPM

## 2019-03-01 VITALS
OXYGEN SATURATION: 96 % | DIASTOLIC BLOOD PRESSURE: 82 MMHG | HEIGHT: 69 IN | BODY MASS INDEX: 25.03 KG/M2 | HEART RATE: 73 BPM | SYSTOLIC BLOOD PRESSURE: 118 MMHG | RESPIRATION RATE: 8 BRPM | WEIGHT: 169 LBS

## 2019-03-13 ENCOUNTER — LAB ENCOUNTER (OUTPATIENT)
Dept: LAB | Facility: REFERENCE LAB | Age: 64
End: 2019-03-13
Attending: FAMILY MEDICINE
Payer: COMMERCIAL

## 2019-03-13 ENCOUNTER — OFFICE VISIT (OUTPATIENT)
Dept: FAMILY MEDICINE CLINIC | Facility: CLINIC | Age: 64
End: 2019-03-13
Payer: COMMERCIAL

## 2019-03-13 VITALS
HEIGHT: 69 IN | WEIGHT: 189 LBS | SYSTOLIC BLOOD PRESSURE: 124 MMHG | DIASTOLIC BLOOD PRESSURE: 68 MMHG | HEART RATE: 81 BPM | BODY MASS INDEX: 27.99 KG/M2 | OXYGEN SATURATION: 98 %

## 2019-03-13 DIAGNOSIS — I10 HYPERTENSION WITH GOAL BLOOD PRESSURE LESS THAN 140/90: ICD-10-CM

## 2019-03-13 DIAGNOSIS — Z12.11 COLON CANCER SCREENING: ICD-10-CM

## 2019-03-13 DIAGNOSIS — Z12.31 SCREENING MAMMOGRAM, ENCOUNTER FOR: ICD-10-CM

## 2019-03-13 DIAGNOSIS — Z00.01 ENCOUNTER FOR ROUTINE ADULT HEALTH EXAMINATION WITH ABNORMAL FINDINGS: Primary | ICD-10-CM

## 2019-03-13 DIAGNOSIS — E03.9 HYPOTHYROIDISM, UNSPECIFIED TYPE: ICD-10-CM

## 2019-03-13 DIAGNOSIS — L98.9 SKIN LESION: ICD-10-CM

## 2019-03-13 DIAGNOSIS — E55.9 VITAMIN D DEFICIENCY: ICD-10-CM

## 2019-03-13 DIAGNOSIS — E53.1 VITAMIN B6 DEFICIENCY: ICD-10-CM

## 2019-03-13 DIAGNOSIS — I25.10 STENOSIS OF LEFT ANTERIOR DESCENDING (LAD) ARTERY: ICD-10-CM

## 2019-03-13 DIAGNOSIS — E11.9 CONTROLLED TYPE 2 DIABETES MELLITUS WITHOUT COMPLICATION, WITHOUT LONG-TERM CURRENT USE OF INSULIN (HCC): ICD-10-CM

## 2019-03-13 DIAGNOSIS — Z00.01 ENCOUNTER FOR ROUTINE ADULT HEALTH EXAMINATION WITH ABNORMAL FINDINGS: ICD-10-CM

## 2019-03-13 DIAGNOSIS — E53.8 B12 DEFICIENCY: ICD-10-CM

## 2019-03-13 LAB
ALBUMIN SERPL-MCNC: 4.2 G/DL (ref 3.4–5)
ALBUMIN/GLOB SERPL: 1.1 {RATIO} (ref 1–2)
ALP LIVER SERPL-CCNC: 101 U/L (ref 50–130)
ALT SERPL-CCNC: 42 U/L (ref 13–56)
ANION GAP SERPL CALC-SCNC: 8 MMOL/L (ref 0–18)
AST SERPL-CCNC: 13 U/L (ref 15–37)
BASOPHILS # BLD AUTO: 0.03 X10(3) UL (ref 0–0.2)
BASOPHILS NFR BLD AUTO: 0.4 %
BILIRUB SERPL-MCNC: 0.4 MG/DL (ref 0.1–2)
BUN BLD-MCNC: 19 MG/DL (ref 7–18)
BUN/CREAT SERPL: 25.7 (ref 10–20)
CALCIUM BLD-MCNC: 9.4 MG/DL (ref 8.5–10.1)
CHLORIDE SERPL-SCNC: 104 MMOL/L (ref 98–107)
CHOLEST SMN-MCNC: 208 MG/DL (ref ?–200)
CO2 SERPL-SCNC: 25 MMOL/L (ref 21–32)
CREAT BLD-MCNC: 0.74 MG/DL (ref 0.55–1.02)
CREAT UR-SCNC: 165 MG/DL
DEPRECATED RDW RBC AUTO: 47.5 FL (ref 35.1–46.3)
EOSINOPHIL # BLD AUTO: 0.15 X10(3) UL (ref 0–0.7)
EOSINOPHIL NFR BLD AUTO: 2.1 %
ERYTHROCYTE [DISTWIDTH] IN BLOOD BY AUTOMATED COUNT: 14.1 % (ref 11–15)
EST. AVERAGE GLUCOSE BLD GHB EST-MCNC: 143 MG/DL (ref 68–126)
GLOBULIN PLAS-MCNC: 3.8 G/DL (ref 2.8–4.4)
GLUCOSE BLD-MCNC: 165 MG/DL (ref 70–99)
HBA1C MFR BLD HPLC: 6.6 % (ref ?–5.7)
HCT VFR BLD AUTO: 44 % (ref 35–48)
HDLC SERPL-MCNC: 59 MG/DL (ref 40–59)
HGB BLD-MCNC: 14.3 G/DL (ref 12–16)
IMM GRANULOCYTES # BLD AUTO: 0.02 X10(3) UL (ref 0–1)
IMM GRANULOCYTES NFR BLD: 0.3 %
LDLC SERPL CALC-MCNC: 120 MG/DL (ref ?–100)
LYMPHOCYTES # BLD AUTO: 2.34 X10(3) UL (ref 1–4)
LYMPHOCYTES NFR BLD AUTO: 32.7 %
M PROTEIN MFR SERPL ELPH: 8 G/DL (ref 6.4–8.2)
MCH RBC QN AUTO: 29.8 PG (ref 26–34)
MCHC RBC AUTO-ENTMCNC: 32.5 G/DL (ref 31–37)
MCV RBC AUTO: 91.7 FL (ref 80–100)
MICROALBUMIN UR-MCNC: 1.61 MG/DL
MICROALBUMIN/CREAT 24H UR-RTO: 9.8 UG/MG (ref ?–30)
MONOCYTES # BLD AUTO: 0.55 X10(3) UL (ref 0.1–1)
MONOCYTES NFR BLD AUTO: 7.7 %
NEUTROPHILS # BLD AUTO: 4.06 X10 (3) UL (ref 1.5–7.7)
NEUTROPHILS # BLD AUTO: 4.06 X10(3) UL (ref 1.5–7.7)
NEUTROPHILS NFR BLD AUTO: 56.8 %
NONHDLC SERPL-MCNC: 149 MG/DL (ref ?–130)
OSMOLALITY SERPL CALC.SUM OF ELEC: 290 MOSM/KG (ref 275–295)
PLATELET # BLD AUTO: 334 10(3)UL (ref 150–450)
POTASSIUM SERPL-SCNC: 4.2 MMOL/L (ref 3.5–5.1)
RBC # BLD AUTO: 4.8 X10(6)UL (ref 3.8–5.3)
SODIUM SERPL-SCNC: 137 MMOL/L (ref 136–145)
T4 FREE SERPL-MCNC: 0.7 NG/DL (ref 0.8–1.7)
TRIGL SERPL-MCNC: 143 MG/DL (ref 30–149)
TSI SER-ACNC: 6.81 MIU/ML (ref 0.36–3.74)
VIT B12 SERPL-MCNC: >2000 PG/ML (ref 193–986)
VLDLC SERPL CALC-MCNC: 29 MG/DL (ref 0–30)
WBC # BLD AUTO: 7.2 X10(3) UL (ref 4–11)

## 2019-03-13 PROCEDURE — 82043 UR ALBUMIN QUANTITATIVE: CPT

## 2019-03-13 PROCEDURE — 80053 COMPREHEN METABOLIC PANEL: CPT

## 2019-03-13 PROCEDURE — 82607 VITAMIN B-12: CPT | Performed by: FAMILY MEDICINE

## 2019-03-13 PROCEDURE — 36415 COLL VENOUS BLD VENIPUNCTURE: CPT

## 2019-03-13 PROCEDURE — 82570 ASSAY OF URINE CREATININE: CPT

## 2019-03-13 PROCEDURE — 80061 LIPID PANEL: CPT

## 2019-03-13 PROCEDURE — 84207 ASSAY OF VITAMIN B-6: CPT

## 2019-03-13 PROCEDURE — 84443 ASSAY THYROID STIM HORMONE: CPT

## 2019-03-13 PROCEDURE — 99396 PREV VISIT EST AGE 40-64: CPT | Performed by: FAMILY MEDICINE

## 2019-03-13 PROCEDURE — 99214 OFFICE O/P EST MOD 30 MIN: CPT | Performed by: FAMILY MEDICINE

## 2019-03-13 PROCEDURE — 83036 HEMOGLOBIN GLYCOSYLATED A1C: CPT

## 2019-03-13 PROCEDURE — 82306 VITAMIN D 25 HYDROXY: CPT

## 2019-03-13 PROCEDURE — 84439 ASSAY OF FREE THYROXINE: CPT

## 2019-03-13 PROCEDURE — 85025 COMPLETE CBC W/AUTO DIFF WBC: CPT

## 2019-03-13 RX ORDER — FLUOXETINE HYDROCHLORIDE 20 MG/1
20 CAPSULE ORAL 2 TIMES DAILY
Qty: 180 CAPSULE | Refills: 1 | Status: SHIPPED | OUTPATIENT
Start: 2019-03-13 | End: 2019-04-01

## 2019-03-13 NOTE — PATIENT INSTRUCTIONS
Prevention Guidelines, Women Ages 48 to 59  Screening tests and vaccines are an important part of managing your health. A screening test is done to find possible disorders or diseases in people who don't have any symptoms.  The goal is to find a disease e Chlamydia Women at increased risk for infection At routine exams   Colorectal cancer All women in this age group Flexible sigmoidoscopy every 5 years, or colonoscopy every 10 years, or double-contrast barium enema every 5 years; yearly fecal occult blood t Hepatitis B Women at increased risk for infection – talk with your healthcare provider 3 doses over 6 months; second dose should be given 1 month after the first dose; the third dose should be given at least 2 months after the second dose and at least 4 mo Use of daily aspirin Women ages 54 and up in this age group who are at risk for cardiovascular health problems such as stroke When your risk is known   Use of tobacco and the health effects it can cause All women in this age group Every exam   1Amermanda Ca

## 2019-03-13 NOTE — PROGRESS NOTES
HPI:   Doug Mccormack is a 58year old female who presents for a complete physical exam.     Denies chest pain but would like to follow-up regarding her previous 50% LAD stenosis. Taking her medications as prescribed with no concerns.   Also taking Fluox SODIUM 10 MG Oral Tab TAKE 1 TABLET BY MOUTH EVERY NIGHT AT BEDTIME Disp: 90 tablet Rfl: 0   thyroid (ARMOUR THYROID) 15 MG Oral Tab TAKE 1 TABLET BY MOUTH DAILY WITH 60MG TABLET Disp: 90 tablet Rfl: 1   thyroid (ARMOUR THYROID) 60 MG Oral Tab TAKE 1 TABLE GENERAL: well developed, well nourished, in no apparent distress   SKIN: hyperpigmented macule/patch on left lateral eye margin, no suspicious lesions  HEENT: atraumatic, normocephalic, throat clear; normal dentition  EYES: PERRLA, EOMI, conjunctiva ar Verapamil and Metprolol. Check Vitamin D, B12, and B6 given history of deficiency.     Discussed with patient the following:  -Monthly breast self-exam  -Breast cancer screening/mammograms and clinical breast exams  -Cervical cancer screening/pap smears-p

## 2019-03-15 DIAGNOSIS — E03.9 HYPOTHYROIDISM, UNSPECIFIED TYPE: Primary | ICD-10-CM

## 2019-03-15 LAB — 25(OH)D3 SERPL-MCNC: 91.4 NG/ML (ref 30–100)

## 2019-03-15 RX ORDER — LEVOTHYROXINE AND LIOTHYRONINE 57; 13.5 UG/1; UG/1
90 TABLET ORAL DAILY
Qty: 90 TABLET | Refills: 0 | Status: SHIPPED | OUTPATIENT
Start: 2019-03-15 | End: 2020-02-25

## 2019-03-16 LAB — VITAMIN B6: 289.2 NMOL/L

## 2019-03-19 ENCOUNTER — APPOINTMENT (OUTPATIENT)
Dept: GENERAL RADIOLOGY | Age: 64
End: 2019-03-19
Attending: FAMILY MEDICINE
Payer: COMMERCIAL

## 2019-03-19 ENCOUNTER — TELEPHONE (OUTPATIENT)
Dept: ORTHOPEDICS CLINIC | Facility: CLINIC | Age: 64
End: 2019-03-19

## 2019-03-19 ENCOUNTER — TELEPHONE (OUTPATIENT)
Dept: FAMILY MEDICINE CLINIC | Facility: CLINIC | Age: 64
End: 2019-03-19

## 2019-03-19 ENCOUNTER — HOSPITAL ENCOUNTER (OUTPATIENT)
Age: 64
Discharge: HOME | End: 2019-03-19
Attending: FAMILY MEDICINE
Payer: COMMERCIAL

## 2019-03-19 VITALS
RESPIRATION RATE: 18 BRPM | WEIGHT: 180 LBS | OXYGEN SATURATION: 97 % | SYSTOLIC BLOOD PRESSURE: 132 MMHG | DIASTOLIC BLOOD PRESSURE: 79 MMHG | BODY MASS INDEX: 26.66 KG/M2 | HEIGHT: 69 IN | TEMPERATURE: 98 F | HEART RATE: 90 BPM

## 2019-03-19 DIAGNOSIS — S62.91XA CLOSED FRACTURE OF RIGHT HAND, INITIAL ENCOUNTER: Primary | ICD-10-CM

## 2019-03-19 PROCEDURE — 99214 OFFICE O/P EST MOD 30 MIN: CPT

## 2019-03-19 PROCEDURE — 29125 APPL SHORT ARM SPLINT STATIC: CPT

## 2019-03-19 PROCEDURE — 73130 X-RAY EXAM OF HAND: CPT | Performed by: FAMILY MEDICINE

## 2019-03-19 NOTE — TELEPHONE ENCOUNTER
Pt called to schedule an appt with dr Sherly Alves stating pt went to immediate care 3-19-19 for a fractured right hand. Pt scheduled first available appt on 3-25-19 with dr Conchis Taveras. Should pt be seen sooner?   Please call pt to advise

## 2019-03-19 NOTE — TELEPHONE ENCOUNTER
Please review xrays from hand injury May I schedule for Thursday with you. Dr. Giovana Chahal is already double booked.

## 2019-03-19 NOTE — ED PROVIDER NOTES
Patient Seen in: [address]    History   Patient presents with:  Hand Injury    Stated Complaint: right hand injury    HPI    Patient is here with right hand injury.   Per patient she has a daughter at home who has mental Head: Normocephalic and atraumatic. Cardiovascular: Normal rate and intact distal pulses. Pulmonary/Chest: Effort normal. No respiratory distress.    Musculoskeletal:   There is swelling and bruising noted along the dorsal aspect of the right hand chidi copy and CD given to patient      MDM         Disposition and Plan     Clinical Impression:  Closed fracture of right hand, initial encounter  (primary encounter diagnosis)     Patient was placed in a boxer splint. Arm sling applied.   Recommend OTC Tyleno

## 2019-03-19 NOTE — TELEPHONE ENCOUNTER
LMTCB:  to confirm pt understands per Dr. Henri Best to  Change medication dose: Pravastatin from 10 to 20 mg as her LDL is still not at goal.
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
## 2019-03-20 NOTE — TELEPHONE ENCOUNTER
[facility] 10 Dr. Quentin Morales      Please ensure patient schedules a follow-up with the hand specialist.      Naz Clark, RNRegistered NurseSigned Yesterday           LMTCB:  to confirm pt understands per Dr. Balbir Murphy to  Change medication dose: Pravastatin from 10 to 20 mg as her LDL is still not at goal.

## 2019-03-21 ENCOUNTER — OFFICE VISIT (OUTPATIENT)
Dept: ORTHOPEDICS CLINIC | Facility: CLINIC | Age: 64
End: 2019-03-21
Payer: COMMERCIAL

## 2019-03-21 ENCOUNTER — OFFICE VISIT (OUTPATIENT)
Dept: SURGERY | Facility: CLINIC | Age: 64
End: 2019-03-21
Payer: COMMERCIAL

## 2019-03-21 DIAGNOSIS — S62.339A CLOSED BOXER'S FRACTURE, INITIAL ENCOUNTER: Primary | ICD-10-CM

## 2019-03-21 DIAGNOSIS — M25.641 JOINT STIFFNESS OF HAND, RIGHT: Primary | ICD-10-CM

## 2019-03-21 DIAGNOSIS — M62.81 DISTAL MUSCLE WEAKNESS: ICD-10-CM

## 2019-03-21 PROCEDURE — 99243 OFF/OP CNSLTJ NEW/EST LOW 30: CPT | Performed by: ORTHOPAEDIC SURGERY

## 2019-03-21 PROCEDURE — 29125 APPL SHORT ARM SPLINT STATIC: CPT | Performed by: OCCUPATIONAL THERAPIST

## 2019-03-21 PROCEDURE — 99212 OFFICE O/P EST SF 10 MIN: CPT | Performed by: ORTHOPAEDIC SURGERY

## 2019-03-21 NOTE — PROGRESS NOTES
Subjective: This splint is much better. Objective:     Current level of performance:  ADL: Independent  Work:   Leisure: Not addressed.     Measurements/Tests:  ROM:         N/A         Treatment Provided this day: Fabricated a right ulna

## 2019-03-21 NOTE — H&P
NURSING INTAKE COMMENTS: Patient presents with:  Consult: Was seen in the [facility] on 3/19/19 due to right hand pain. Xray of the hand was taken that day and was informed she had a boxers fracture. Denies any numbness or tingling.   Pain on tablet by mouth daily. Disp:  Rfl:    Omega-3 Fatty Acids (FISH OIL OR) Take by mouth. Disp:  Rfl:    MAGNESIUM OR Take by mouth. Disp:  Rfl:    Ascorbic Acid (VITAMIN C OR) Take by mouth.  Disp:  Rfl:    Vitamin B-12 500 MCG Oral Tab Take 500 mcg by mouth Concerns:        Caffeine Concern: Not Asked        Exercise: Not Asked        Seat Belt: Not Asked        Special Diet: Not Asked        Stress Concern: Not Asked        Weight Concern: Not Asked    Social History Narrative      Not on file      A compreh

## 2019-03-27 NOTE — PROGRESS NOTES
4/4/2019 2:10 PM   Appointment   MRN:  [id number]   Description: 58year old female Provider: Jamia Cedillo MD Department: [facility]     Pt scheduled to see ortho as above.  AS notified

## 2019-03-28 ENCOUNTER — TELEPHONE (OUTPATIENT)
Dept: FAMILY MEDICINE CLINIC | Facility: CLINIC | Age: 64
End: 2019-03-28

## 2019-03-28 NOTE — TELEPHONE ENCOUNTER
LMTCB r/t test results and mychart msg sent to pt.  AS notified      Subject Delivery           Medication questions  3/28/2019 11:54 AM     To: Maya Helms      From: Edmundo Neal RN      Created: 3/28/2019 11:54 AM        Hi Laura Torres,     I'm not sure

## 2019-04-01 RX ORDER — FLUOXETINE HYDROCHLORIDE 20 MG/1
CAPSULE ORAL
Qty: 180 CAPSULE | Refills: 1 | Status: SHIPPED | OUTPATIENT
Start: 2019-04-01 | End: 2019-09-30

## 2019-04-01 NOTE — TELEPHONE ENCOUNTER
A refill request was received for:  Requested Prescriptions     Pending Prescriptions Disp Refills   • FLUOXETINE HCL 20 MG Oral Cap [Pharmacy Med Name: FLUOXETINE 20MG CAPSULES] 60 capsule 0     Sig: TAKE 1 CAPSULE(20 MG) BY MOUTH TWICE DAILY     Last ref

## 2019-04-04 ENCOUNTER — HOSPITAL ENCOUNTER (OUTPATIENT)
Dept: GENERAL RADIOLOGY | Facility: HOSPITAL | Age: 64
Discharge: HOME | End: 2019-04-04
Attending: ORTHOPAEDIC SURGERY
Payer: COMMERCIAL

## 2019-04-04 ENCOUNTER — OFFICE VISIT (OUTPATIENT)
Dept: ORTHOPEDICS CLINIC | Facility: CLINIC | Age: 64
End: 2019-04-04
Payer: COMMERCIAL

## 2019-04-04 DIAGNOSIS — Z47.89 ORTHOPEDIC AFTERCARE: Primary | ICD-10-CM

## 2019-04-04 DIAGNOSIS — S62.339A CLOSED BOXER'S FRACTURE, INITIAL ENCOUNTER: ICD-10-CM

## 2019-04-04 DIAGNOSIS — Z47.89 ORTHOPEDIC AFTERCARE: ICD-10-CM

## 2019-04-04 PROCEDURE — 99212 OFFICE O/P EST SF 10 MIN: CPT | Performed by: ORTHOPAEDIC SURGERY

## 2019-04-04 PROCEDURE — 99213 OFFICE O/P EST LOW 20 MIN: CPT | Performed by: ORTHOPAEDIC SURGERY

## 2019-04-04 PROCEDURE — 73130 X-RAY EXAM OF HAND: CPT | Performed by: ORTHOPAEDIC SURGERY

## 2019-04-04 NOTE — PROGRESS NOTES
NURSING INTAKE COMMENTS: Patient presents with:  Fracture: F/u fracture of the right hand. Stts that pain in the hand has improved since last office visit. Noticed some swelling but no numbness, or tingling.   Pain only when she puts pressure on the hand mouth. Disp:  Rfl:    MAGNESIUM OR Take by mouth. Disp:  Rfl:    Ascorbic Acid (VITAMIN C OR) Take by mouth. Disp:  Rfl:    Vitamin B-12 500 MCG Oral Tab Take 500 mcg by mouth daily.  Disp:  Rfl:      No current facility-administered medications on file adeel Belt: Not Asked        Special Diet: Not Asked        Stress Concern: Not Asked        Weight Concern: Not Asked    Social History Narrative      Not on file      A comprehensive 10 point review of systems was completed.   Pertinent positives and negatives

## 2019-04-12 RX ORDER — PRAVASTATIN SODIUM 10 MG
TABLET ORAL
Qty: 90 TABLET | Refills: 0 | Status: SHIPPED | OUTPATIENT
Start: 2019-04-12 | End: 2019-07-05

## 2019-04-29 RX ORDER — THYROID 60 MG/1
TABLET ORAL
Qty: 90 TABLET | Refills: 0 | OUTPATIENT
Start: 2019-04-29

## 2019-05-01 ENCOUNTER — HOSPITAL ENCOUNTER (OUTPATIENT)
Dept: GENERAL RADIOLOGY | Facility: HOSPITAL | Age: 64
Discharge: HOME | End: 2019-05-01
Attending: ORTHOPAEDIC SURGERY
Payer: COMMERCIAL

## 2019-05-01 ENCOUNTER — OFFICE VISIT (OUTPATIENT)
Dept: ORTHOPEDICS CLINIC | Facility: CLINIC | Age: 64
End: 2019-05-01
Payer: COMMERCIAL

## 2019-05-01 DIAGNOSIS — Z47.89 ORTHOPEDIC AFTERCARE: Primary | ICD-10-CM

## 2019-05-01 DIAGNOSIS — S62.339A CLOSED BOXER'S FRACTURE, INITIAL ENCOUNTER: ICD-10-CM

## 2019-05-01 DIAGNOSIS — Z47.89 ORTHOPEDIC AFTERCARE: ICD-10-CM

## 2019-05-01 PROCEDURE — 99213 OFFICE O/P EST LOW 20 MIN: CPT | Performed by: ORTHOPAEDIC SURGERY

## 2019-05-01 PROCEDURE — 73130 X-RAY EXAM OF HAND: CPT | Performed by: ORTHOPAEDIC SURGERY

## 2019-05-01 PROCEDURE — 99212 OFFICE O/P EST SF 10 MIN: CPT | Performed by: ORTHOPAEDIC SURGERY

## 2019-05-01 NOTE — PROGRESS NOTES
NURSING INTAKE COMMENTS: Patient presents with:  Fracture: right hand boxer's fracture -- States hand has pain with pressure or picking up something heavy. Rates pain 3-4/10. Denies any numbness or tingling. Right handed.        HPI: This 58year old female mcg by mouth daily. Disp:  Rfl:      No current facility-administered medications on file prior to visit.        Amoxicillin             FEVER  Sulfa Antibiotics           Family History   Problem Relation Age of Onset   • Heart Disorder Father    • Cancer comprehensive 10 point review of systems was completed. Pertinent positives and negatives noted in the the HPI.     Physical Examination:  There is no height or weight on file to calculate BMI., Wt Readings from Last 6 Encounters:  03/19/19 : 180 lb (81.6

## 2019-07-05 RX ORDER — PRAVASTATIN SODIUM 10 MG
TABLET ORAL
Qty: 90 TABLET | Refills: 0 | Status: SHIPPED | OUTPATIENT
Start: 2019-07-05 | End: 2019-09-23

## 2019-09-23 RX ORDER — PRAVASTATIN SODIUM 10 MG
TABLET ORAL
Qty: 90 TABLET | Refills: 0 | Status: SHIPPED | OUTPATIENT
Start: 2019-09-23 | End: 2019-12-28

## 2019-09-30 RX ORDER — FLUOXETINE HYDROCHLORIDE 20 MG/1
CAPSULE ORAL
Qty: 180 CAPSULE | Refills: 0 | Status: SHIPPED | OUTPATIENT
Start: 2019-09-30 | End: 2020-12-29

## 2019-12-04 NOTE — TELEPHONE ENCOUNTER
A refill request was received for:  Requested Prescriptions     Pending Prescriptions Disp Refills   • METOPROLOL TARTRATE 25 MG Oral Tab [Pharmacy Med Name: METOPROLOL TARTRATE 25MG TABLETS] 180 tablet 0     Sig: TAKE 1 TABLET(25 MG) BY MOUTH TWICE DAILY

## 2019-12-28 RX ORDER — PRAVASTATIN SODIUM 10 MG
TABLET ORAL
Qty: 90 TABLET | Refills: 0 | Status: SHIPPED | OUTPATIENT
Start: 2019-12-28 | End: 2020-02-25

## 2020-01-28 NOTE — MR AVS SNAPSHOT
[address] at [address], [place] [address]  [phone]               Thank you for choosing us for your health care visit with London Cruz DO.   We are glad to serve you and happy to provide you with th Department of Anesthesiology  Preprocedure Note       Name:  Cookei Alex   Age:  39 y.o.  :  1983                                          MRN:  [id number]         Date:  2020      Surgeon: Benetta Nissen):  Nancy Saleh MD    Procedure: COLONOSCOPY DIAGNOSTIC (N/A )    Medications prior to admission:   Prior to Admission medications    Medication Sig Start Date End Date Taking? Authorizing Provider   QUEtiapine (SEROQUEL XR) 50 MG extended release tablet Take 50 mg by mouth nightly   Yes Historical Provider, MD   polyethylene glycol (GLYCOLAX) powder USE AS DIRECTED BY FOLLOWING YOUR PATIENT INSTRUCTIONS GIVEN BY OFFICE. 19  Yes Nancy Saleh MD   bisacodyl (DULCOLAX) 5 MG EC tablet TAKE 4 TABS AT 10 AM DAY PRIOR TO COLONOSCOPY 10/3/19  Yes Nancy Saleh MD   pregabalin (LYRICA) 75 MG capsule Take 75 mg by mouth 3 times daily.    Yes Historical Provider, MD   sucralfate (CARAFATE) 1 GM tablet Take 1 tablet by mouth 4 times daily 19  Yes Carlota Herring MD   sertraline (ZOLOFT) 50 MG tablet Take 1 tablet by mouth daily 18  Yes Veronica Clarke MD   COMBIVENT RESPIMAT  MCG/ACT AERS inhaler Indications: uses PRN  9/15/18  Yes Historical Provider, MD   montelukast (SINGULAIR) 10 MG tablet Take 1 tablet by mouth nightly 18  Yes Nadia Rivas MD   Fluticasone Furoate-Vilanterol (BREO ELLIPTA) 200-25 MCG/INH AEPB Inhale 1 puff into the lungs daily   Yes Historical Provider, MD   albuterol (PROVENTIL) (2.5 MG/3ML) 0.083% nebulizer solution Take 3 mLs by nebulization every 4 hours as needed for Wheezing 2/15/17  Yes Esther Montanez MD   pantoprazole sodium (PROTONIX) 40 MG PACK packet Take 40 mg by mouth every morning (before breakfast)    Historical Provider, MD   diphenhydrAMINE (BENADRYL) 25 MG capsule Take 1 capsule by mouth every 6 hours as needed for Itching  Patient not taking: Reported on 2020 3/29/19   Kiki Gandhi DO   EPINEPHrine (EPIPEN 2-HELDER) 0.3 MG/0.3ML [facility] [address]  [address], [place]    [address]  [address] [address] [place], [place]    It is the patient's responsibility to check with and follow their insurance company's guidelines for prior authorization injection Inject 0.3 mLs into the muscle once for 1 dose Use as directed for allergic reaction 3/29/19 3/29/19  [place] Miguel, DO   docusate sodium (COLACE, DULCOLAX) 100 MG CAPS Take 100 mg by mouth 2 times daily  Patient not taking: Reported on 18   Cosmo Saunders DO       Current medications:    Current Facility-Administered Medications   Medication Dose Route Frequency Provider Last Rate Last Dose    lactated ringers infusion   Intravenous Continuous Charmaine Sanchez  mL/hr at 20 1003         Allergies:     Allergies   Allergen Reactions    Nsaids      Irritates her Barrets esophogus    Toradol [Ketorolac Tromethamine]     Ultram [Tramadol] Nausea Only     Gastric upset       Problem List:    Patient Active Problem List   Diagnosis Code    Asthma J45.909    Anxiety/Depression F41.9    GERD K21.9    Bipolar disorder (HCC) F31.9    Iron deficiency anemia D50.9    Cervical disc herniation M50.20    Tobacco use Z72.0    Sadler's esophagus without dysplasia K22.70    Hiatal hernia K44.9    COPD J44.9    Hx of IUFD (G2) O09.299    gHTN (Diagnosed @ 23wk) R03.0    Seizure ([facility] [address] R56.9    AMA O09.529    Hx of  x2 (G3, G4) Z98.891    Arthritis M19.90    Cervical radiculopathy M54.12    Migraine without aura G43.009    Spinal stenosis in cervical region M48.02    Hx of Borderline DM 2  E11.59    BMI 45 Z68.41    Cellulitis and abscess of trunk L03.319, L02.219    Incisional abscess T81.49XA    Sepsis ([address] A41.9    Migraines G43.909    Pelvic pain in female R10.2       Past Medical History:        Diagnosis Date    Anxiety     Arthritis     OA    Asthma     Sadler's esophagus     LONG SEGMENT    Bipolar 1 disorder ([address]     CAD (coronary artery disease)     COPD (chronic obstructive pulmonary disease) (HCC)     Depression     and bipolar    Esophagitis     severe    GERD (gastroesophageal reflux disease)     Headache(784.0)     Herniated disc, Abdominal bloating        Callus of foot          Instructions and Information about Your Health     None      Allergies as of Jun 07, 2017     Amoxicillin Fever    Sulfa Antibiotics                 Today's Vital Signs     BP Pulse Height Weight BMI    11 cervical     Hiatal hernia     Incisional abscess 1/15/2019    Kidney stones     Pleurisy     hx of \"years ago\"    Pneumonia     past penumonia    UTI (urinary tract infection)     Vision abnormalities     wears glasses       Past Surgical History:        Procedure Laterality Date    CERVICAL DISC SURGERY      x2     SECTION  , 2018    x 2     SECTION N/A 2018     SECTION performed by Alexandra Monteiro MD at [facility] OR    COLONOSCOPY N/A 10/1/2019    COLONOSCOPY DIAGNOSTIC ABORTED performed by Alejandra Marques MD at [address], COLON, DIAGNOSTIC      KNEE ARTHROSCOPY Left 5/20/15    KNEE SURGERY Left     x3    LAPAROSCOPY      LAPAROSCOPY N/A 10/5/2017    LAPAROSCOPIC REMOVAL INTRA ABDOMINAL LIPOMA LOWER RIGHT performed by Norris Samuel DO at [address] ESOPHAGOGASTRODUODENOSCOPY TRANSORAL DIAGNOSTIC N/A 3/2/2017    EGD ESOPHAGOGASTRODUODENOSCOPY  IP  performed by Jose Ramon Harrell MD at [place] [address]  2016    severe esophagitis    UPPER GASTROINTESTINAL ENDOSCOPY  2017    barretts; hiatus hernia; gastritis    UPPER GASTROINTESTINAL ENDOSCOPY  2017    long seg mullins's with linear erosions, esophagitis, small hiatal hernia    UPPER GASTROINTESTINAL ENDOSCOPY N/A 2018    MULLINS'S    UPPER GASTROINTESTINAL ENDOSCOPY N/A 10/1/2019    EGD BIOPSY performed by Alejandra Marques MD at [address] [place] [address] History:    Social History     Tobacco Use    Smoking status: Current Every Day Smoker     Packs/day: 0.25     Years: 21.00     Pack years: 5.25     Types: Cigarettes    Smokeless tobacco: Never Used   Substance Use Topics    Alcohol use:  No                                Ready to quit: Not Answered  Counseling given: Not Answered      Vital Signs (Current):   Vitals:    20 0945 20 1020   BP: 123/71    Pulse: 76    Resp: 18 18   Temp: 97.7 °F (36.5 °C) medications prescribed for you. Read the directions carefully, and ask your doctor or other care provider to review them with you.             MyChart     Visit [facility]  You can access your MyChart to more actively manage your health care and view more deta

## 2020-02-07 ENCOUNTER — TELEPHONE (OUTPATIENT)
Dept: FAMILY MEDICINE CLINIC | Facility: CLINIC | Age: 65
End: 2020-02-07

## 2020-02-07 DIAGNOSIS — E03.9 HYPOTHYROIDISM, UNSPECIFIED TYPE: ICD-10-CM

## 2020-02-07 DIAGNOSIS — Z12.11 COLON CANCER SCREENING: ICD-10-CM

## 2020-02-07 DIAGNOSIS — E55.9 VITAMIN D DEFICIENCY: ICD-10-CM

## 2020-02-07 DIAGNOSIS — Z00.00 ENCOUNTER FOR MEDICARE ANNUAL WELLNESS EXAM: ICD-10-CM

## 2020-02-07 DIAGNOSIS — E11.9 CONTROLLED TYPE 2 DIABETES MELLITUS WITHOUT COMPLICATION, WITHOUT LONG-TERM CURRENT USE OF INSULIN (HCC): Primary | ICD-10-CM

## 2020-02-07 NOTE — TELEPHONE ENCOUNTER
Pt states she does have medicare and private INS now. Pt informed we will put the lab orders in for her and give the hours that the lab is open so she can come for testing. Pt's appt modified to show that it is a medicare wellness visit.

## 2020-02-07 NOTE — TELEPHONE ENCOUNTER
Patient is scheduled for annual physical with Dr. Judith Rogers on 2/25/20 and would like to have blood work done prior to appointment.

## 2020-02-07 NOTE — TELEPHONE ENCOUNTER
I will put the orders in but can you please find out if she has Medicare now so I know how to code?  Also ensure she is schedule for a Medicare wellness visit if it is medicare and not a regular physical.

## 2020-02-07 NOTE — TELEPHONE ENCOUNTER
Lab orders placed. Also ordered FIT testing for colon cancer screening as she has not had a colonoscopy or FIT test and needs colon cancer screening based on her age.

## 2020-02-18 ENCOUNTER — LAB ENCOUNTER (OUTPATIENT)
Dept: LAB | Facility: REFERENCE LAB | Age: 65
End: 2020-02-18
Attending: FAMILY MEDICINE
Payer: COMMERCIAL

## 2020-02-18 DIAGNOSIS — E03.9 HYPOTHYROIDISM, UNSPECIFIED TYPE: ICD-10-CM

## 2020-02-18 DIAGNOSIS — E55.9 VITAMIN D DEFICIENCY: ICD-10-CM

## 2020-02-18 DIAGNOSIS — E11.9 CONTROLLED TYPE 2 DIABETES MELLITUS WITHOUT COMPLICATION, WITHOUT LONG-TERM CURRENT USE OF INSULIN (HCC): ICD-10-CM

## 2020-02-18 DIAGNOSIS — Z00.00 ENCOUNTER FOR MEDICARE ANNUAL WELLNESS EXAM: ICD-10-CM

## 2020-02-18 LAB
ALBUMIN SERPL-MCNC: 4 G/DL (ref 3.4–5)
ALBUMIN/GLOB SERPL: 1 {RATIO} (ref 1–2)
ALP LIVER SERPL-CCNC: 102 U/L (ref 50–130)
ALT SERPL-CCNC: 74 U/L (ref 13–56)
ANION GAP SERPL CALC-SCNC: 8 MMOL/L (ref 0–18)
AST SERPL-CCNC: 32 U/L (ref 15–37)
BASOPHILS # BLD AUTO: 0.04 X10(3) UL (ref 0–0.2)
BASOPHILS NFR BLD AUTO: 0.5 %
BILIRUB SERPL-MCNC: 0.5 MG/DL (ref 0.1–2)
BUN BLD-MCNC: 22 MG/DL (ref 7–18)
BUN/CREAT SERPL: 32.8 (ref 10–20)
CALCIUM BLD-MCNC: 9.3 MG/DL (ref 8.5–10.1)
CHLORIDE SERPL-SCNC: 105 MMOL/L (ref 98–112)
CHOLEST SMN-MCNC: 218 MG/DL (ref ?–200)
CO2 SERPL-SCNC: 27 MMOL/L (ref 21–32)
CREAT BLD-MCNC: 0.67 MG/DL (ref 0.55–1.02)
DEPRECATED RDW RBC AUTO: 45.9 FL (ref 35.1–46.3)
EOSINOPHIL # BLD AUTO: 0.19 X10(3) UL (ref 0–0.7)
EOSINOPHIL NFR BLD AUTO: 2.4 %
ERYTHROCYTE [DISTWIDTH] IN BLOOD BY AUTOMATED COUNT: 13.8 % (ref 11–15)
EST. AVERAGE GLUCOSE BLD GHB EST-MCNC: 140 MG/DL (ref 68–126)
GLOBULIN PLAS-MCNC: 4.2 G/DL (ref 2.8–4.4)
GLUCOSE BLD-MCNC: 163 MG/DL (ref 70–99)
HBA1C MFR BLD HPLC: 6.5 % (ref ?–5.7)
HCT VFR BLD AUTO: 44.8 % (ref 35–48)
HDLC SERPL-MCNC: 54 MG/DL (ref 40–59)
HGB BLD-MCNC: 14.3 G/DL (ref 12–16)
IMM GRANULOCYTES # BLD AUTO: 0.01 X10(3) UL (ref 0–1)
IMM GRANULOCYTES NFR BLD: 0.1 %
LDLC SERPL CALC-MCNC: 128 MG/DL (ref ?–100)
LYMPHOCYTES # BLD AUTO: 2.67 X10(3) UL (ref 1–4)
LYMPHOCYTES NFR BLD AUTO: 34.3 %
M PROTEIN MFR SERPL ELPH: 8.2 G/DL (ref 6.4–8.2)
MCH RBC QN AUTO: 28.8 PG (ref 26–34)
MCHC RBC AUTO-ENTMCNC: 31.9 G/DL (ref 31–37)
MCV RBC AUTO: 90.3 FL (ref 80–100)
MONOCYTES # BLD AUTO: 0.44 X10(3) UL (ref 0.1–1)
MONOCYTES NFR BLD AUTO: 5.6 %
NEUTROPHILS # BLD AUTO: 4.44 X10 (3) UL (ref 1.5–7.7)
NEUTROPHILS # BLD AUTO: 4.44 X10(3) UL (ref 1.5–7.7)
NEUTROPHILS NFR BLD AUTO: 57.1 %
NONHDLC SERPL-MCNC: 164 MG/DL (ref ?–130)
OSMOLALITY SERPL CALC.SUM OF ELEC: 297 MOSM/KG (ref 275–295)
PATIENT FASTING Y/N/NP: YES
PATIENT FASTING Y/N/NP: YES
PLATELET # BLD AUTO: 332 10(3)UL (ref 150–450)
POTASSIUM SERPL-SCNC: 3.9 MMOL/L (ref 3.5–5.1)
RBC # BLD AUTO: 4.96 X10(6)UL (ref 3.8–5.3)
SODIUM SERPL-SCNC: 140 MMOL/L (ref 136–145)
T4 FREE SERPL-MCNC: 0.6 NG/DL (ref 0.8–1.7)
TRIGL SERPL-MCNC: 178 MG/DL (ref 30–149)
TSI SER-ACNC: 5.16 MIU/ML (ref 0.36–3.74)
VLDLC SERPL CALC-MCNC: 36 MG/DL (ref 0–30)
WBC # BLD AUTO: 7.8 X10(3) UL (ref 4–11)

## 2020-02-18 PROCEDURE — 83036 HEMOGLOBIN GLYCOSYLATED A1C: CPT

## 2020-02-18 PROCEDURE — 84443 ASSAY THYROID STIM HORMONE: CPT

## 2020-02-18 PROCEDURE — 80061 LIPID PANEL: CPT

## 2020-02-18 PROCEDURE — 36415 COLL VENOUS BLD VENIPUNCTURE: CPT

## 2020-02-18 PROCEDURE — 85025 COMPLETE CBC W/AUTO DIFF WBC: CPT

## 2020-02-18 PROCEDURE — 82306 VITAMIN D 25 HYDROXY: CPT

## 2020-02-18 PROCEDURE — 84439 ASSAY OF FREE THYROXINE: CPT

## 2020-02-18 PROCEDURE — 80053 COMPREHEN METABOLIC PANEL: CPT

## 2020-02-19 LAB — 25(OH)D3 SERPL-MCNC: 66 NG/ML (ref 30–100)

## 2020-02-25 ENCOUNTER — APPOINTMENT (OUTPATIENT)
Dept: LAB | Age: 65
End: 2020-02-25
Attending: FAMILY MEDICINE
Payer: COMMERCIAL

## 2020-02-25 ENCOUNTER — OFFICE VISIT (OUTPATIENT)
Dept: FAMILY MEDICINE CLINIC | Facility: CLINIC | Age: 65
End: 2020-02-25
Payer: COMMERCIAL

## 2020-02-25 VITALS
HEART RATE: 76 BPM | BODY MASS INDEX: 27.55 KG/M2 | DIASTOLIC BLOOD PRESSURE: 72 MMHG | SYSTOLIC BLOOD PRESSURE: 116 MMHG | HEIGHT: 69 IN | OXYGEN SATURATION: 98 % | WEIGHT: 186 LBS

## 2020-02-25 DIAGNOSIS — E11.9 CONTROLLED TYPE 2 DIABETES MELLITUS WITHOUT COMPLICATION, WITHOUT LONG-TERM CURRENT USE OF INSULIN (HCC): ICD-10-CM

## 2020-02-25 DIAGNOSIS — Z12.11 COLON CANCER SCREENING: ICD-10-CM

## 2020-02-25 DIAGNOSIS — Z00.01 ENCOUNTER FOR ROUTINE ADULT HEALTH EXAMINATION WITH ABNORMAL FINDINGS: Primary | ICD-10-CM

## 2020-02-25 DIAGNOSIS — I10 HYPERTENSION WITH GOAL BLOOD PRESSURE LESS THAN 140/90: ICD-10-CM

## 2020-02-25 DIAGNOSIS — Z12.31 SCREENING MAMMOGRAM, ENCOUNTER FOR: ICD-10-CM

## 2020-02-25 DIAGNOSIS — Z23 NEED FOR VACCINATION: ICD-10-CM

## 2020-02-25 DIAGNOSIS — E03.9 HYPOTHYROIDISM, UNSPECIFIED TYPE: ICD-10-CM

## 2020-02-25 LAB
CREAT UR-SCNC: 127 MG/DL
MICROALBUMIN UR-MCNC: 1.89 MG/DL
MICROALBUMIN/CREAT 24H UR-RTO: 14.9 UG/MG (ref ?–30)

## 2020-02-25 PROCEDURE — 90670 PCV13 VACCINE IM: CPT | Performed by: FAMILY MEDICINE

## 2020-02-25 PROCEDURE — 99397 PER PM REEVAL EST PAT 65+ YR: CPT | Performed by: FAMILY MEDICINE

## 2020-02-25 PROCEDURE — 90471 IMMUNIZATION ADMIN: CPT | Performed by: FAMILY MEDICINE

## 2020-02-25 PROCEDURE — 82043 UR ALBUMIN QUANTITATIVE: CPT

## 2020-02-25 PROCEDURE — 82570 ASSAY OF URINE CREATININE: CPT

## 2020-02-25 PROCEDURE — 90472 IMMUNIZATION ADMIN EACH ADD: CPT | Performed by: FAMILY MEDICINE

## 2020-02-25 PROCEDURE — 99213 OFFICE O/P EST LOW 20 MIN: CPT | Performed by: FAMILY MEDICINE

## 2020-02-25 PROCEDURE — 90662 IIV NO PRSV INCREASED AG IM: CPT | Performed by: FAMILY MEDICINE

## 2020-02-25 RX ORDER — PRAVASTATIN SODIUM 20 MG
20 TABLET ORAL NIGHTLY
Qty: 90 TABLET | Refills: 1 | Status: SHIPPED | OUTPATIENT
Start: 2020-02-25 | End: 2020-08-21

## 2020-02-25 RX ORDER — LEVOTHYROXINE AND LIOTHYRONINE 57; 13.5 UG/1; UG/1
TABLET ORAL
Qty: 90 TABLET | Refills: 0 | Status: SHIPPED | OUTPATIENT
Start: 2020-02-25 | End: 2020-05-26

## 2020-02-25 RX ORDER — LEVOTHYROXINE AND LIOTHYRONINE 9.5; 2.25 UG/1; UG/1
TABLET ORAL
Qty: 90 TABLET | Refills: 0 | Status: SHIPPED | OUTPATIENT
Start: 2020-02-25 | End: 2020-05-26

## 2020-02-25 NOTE — PROGRESS NOTES
HPI:   Olivia Royal is a 71year old female who presents for a complete physical exam.   Patient presents with:  Physical: flu shot, pneumonia  Other: colonoscopy order      Last mammogram: 2013, declines further mammograms    Previous colonoscopy: Nev TABLET BY MOUTH TWICE DAILY WITH MEALS 60 tablet 0   • METOPROLOL TARTRATE 25 MG Oral Tab TAKE 1 TABLET(25 MG) BY MOUTH TWICE DAILY 60 tablet 0   • FLUOXETINE HCL 20 MG Oral Cap TAKE 1 CAPSULE(20 MG) BY MOUTH TWICE DAILY 180 capsule 0   • Cholecalciferol ( Breastfeeding No   BMI 27.47 kg/m²     GENERAL: well developed, well nourished, in no apparent distress   SKIN: no rashes, no suspicious lesions  HEENT: atraumatic, normocephalic, throat clear; normal dentition  EYES: PERRLA, EOMI, conjunctiva are clear controlled and needs to schedule diabetic eye exam, but will call to schedule with Dr. Hoang Frazier. Also to perform microalbumin:Cr ratio testing today as she was unable to urinate at last visit.   Blood pressure well controlled on current Verapamil and Metoprol James,   2/25/2020  2:04 PM

## 2020-02-25 NOTE — PATIENT INSTRUCTIONS
-Schedule diabetic eye exam soon and have them fax me the note  -Increase [place] thyroid to 105 mg daily and return in 6-8 weeks to repeat your thyroid testing  -Increase Pravastatin to 20 mg nightly   Prevention Guidelines, Women Ages 72 and Older  Screen Colorectal cancer All women over the age of 48  This screening is advised against for women over 76 or if there is a life expectancy of less than 10 years.  Multiple tests are available and are used at different times.  Possible tests include: flexible sigm Vision All women in this age group Every 1 to 2 years; if you have a chronic health condition, ask your healthcare provider if you need exams more often   Vaccine Who needs it How often   Chickenpox (varicella) All women in this age group who have no recor Use of daily aspirin Talk to your healthcare provider about whether or not to start taking low-dose aspirin for the prevention of cardiovascular disease (CVD) and colorectal cancer in adults ages 61 to 71 who have at least a 10% risk of getting CVD within

## 2020-02-26 ENCOUNTER — HOSPITAL ENCOUNTER (OUTPATIENT)
Dept: MAMMOGRAPHY | Age: 65
Discharge: HOME | End: 2020-02-26
Attending: FAMILY MEDICINE
Payer: COMMERCIAL

## 2020-02-26 DIAGNOSIS — Z12.31 SCREENING MAMMOGRAM, ENCOUNTER FOR: ICD-10-CM

## 2020-02-26 PROCEDURE — 77067 SCR MAMMO BI INCL CAD: CPT | Performed by: FAMILY MEDICINE

## 2020-02-26 PROCEDURE — 77063 BREAST TOMOSYNTHESIS BI: CPT | Performed by: FAMILY MEDICINE

## 2020-03-25 RX ORDER — PRAVASTATIN SODIUM 10 MG
TABLET ORAL
Qty: 90 TABLET | Refills: 0 | Status: SHIPPED | OUTPATIENT
Start: 2020-03-25 | End: 2020-06-18

## 2020-05-26 RX ORDER — LEVOTHYROXINE, LIOTHYRONINE 9.5; 2.25 UG/1; UG/1
TABLET ORAL
Qty: 90 TABLET | Refills: 0 | Status: SHIPPED | OUTPATIENT
Start: 2020-05-26 | End: 2020-08-21

## 2020-05-26 RX ORDER — LEVOTHYROXINE AND LIOTHYRONINE 57; 13.5 UG/1; UG/1
TABLET ORAL
Qty: 90 TABLET | Refills: 0 | Status: SHIPPED | OUTPATIENT
Start: 2020-05-26 | End: 2020-08-21

## 2020-06-18 RX ORDER — PRAVASTATIN SODIUM 10 MG
TABLET ORAL
Qty: 90 TABLET | Refills: 0 | Status: SHIPPED | OUTPATIENT
Start: 2020-06-18 | End: 2020-09-14

## 2020-06-19 RX ORDER — FLUOXETINE HYDROCHLORIDE 20 MG/1
CAPSULE ORAL
Qty: 180 CAPSULE | Refills: 0 | OUTPATIENT
Start: 2020-06-19

## 2020-08-12 NOTE — TELEPHONE ENCOUNTER
A refill request was received for:  Requested Prescriptions     Pending Prescriptions Disp Refills   • VERAPAMIL HCL  MG Oral Tab CR [Pharmacy Med Name: VERAPAMIL ER 120MG TABLETS] 90 tablet 0     Sig: TAKE 1 TABLET BY MOUTH EVERY DAY     Last refill

## 2020-08-21 RX ORDER — LEVOTHYROXINE, LIOTHYRONINE 9.5; 2.25 UG/1; UG/1
TABLET ORAL
Qty: 90 TABLET | Refills: 0 | Status: SHIPPED | OUTPATIENT
Start: 2020-08-21 | End: 2020-11-19

## 2020-08-21 RX ORDER — PRAVASTATIN SODIUM 20 MG
TABLET ORAL
Qty: 90 TABLET | Refills: 1 | Status: SHIPPED | OUTPATIENT
Start: 2020-08-21 | End: 2020-12-10

## 2020-08-21 RX ORDER — LEVOTHYROXINE AND LIOTHYRONINE 57; 13.5 UG/1; UG/1
TABLET ORAL
Qty: 90 TABLET | Refills: 0 | Status: SHIPPED | OUTPATIENT
Start: 2020-08-21 | End: 2020-11-19

## 2020-09-14 RX ORDER — PRAVASTATIN SODIUM 10 MG
TABLET ORAL
Qty: 90 TABLET | Refills: 0 | Status: SHIPPED | OUTPATIENT
Start: 2020-09-14 | End: 2020-12-29

## 2020-11-19 RX ORDER — LEVOTHYROXINE AND LIOTHYRONINE 57; 13.5 UG/1; UG/1
TABLET ORAL
Qty: 90 TABLET | Refills: 0 | Status: SHIPPED | OUTPATIENT
Start: 2020-11-19 | End: 2020-12-10 | Stop reason: DRUGHIGH

## 2020-11-19 RX ORDER — LEVOTHYROXINE, LIOTHYRONINE 9.5; 2.25 UG/1; UG/1
TABLET ORAL
Qty: 90 TABLET | Refills: 0 | Status: SHIPPED | OUTPATIENT
Start: 2020-11-19 | End: 2020-12-10 | Stop reason: DRUGHIGH

## 2020-12-08 ENCOUNTER — LAB ENCOUNTER (OUTPATIENT)
Dept: LAB | Facility: REFERENCE LAB | Age: 65
End: 2020-12-08
Attending: FAMILY MEDICINE
Payer: COMMERCIAL

## 2020-12-08 DIAGNOSIS — E11.9 CONTROLLED TYPE 2 DIABETES MELLITUS WITHOUT COMPLICATION, WITHOUT LONG-TERM CURRENT USE OF INSULIN (HCC): ICD-10-CM

## 2020-12-08 DIAGNOSIS — E03.9 HYPOTHYROIDISM, UNSPECIFIED TYPE: ICD-10-CM

## 2020-12-08 DIAGNOSIS — E78.2 MIXED HYPERLIPIDEMIA: ICD-10-CM

## 2020-12-08 PROCEDURE — 84443 ASSAY THYROID STIM HORMONE: CPT

## 2020-12-08 PROCEDURE — 36415 COLL VENOUS BLD VENIPUNCTURE: CPT

## 2020-12-08 PROCEDURE — 84439 ASSAY OF FREE THYROXINE: CPT

## 2020-12-08 PROCEDURE — 80061 LIPID PANEL: CPT

## 2020-12-08 PROCEDURE — 83036 HEMOGLOBIN GLYCOSYLATED A1C: CPT

## 2020-12-08 PROCEDURE — 80053 COMPREHEN METABOLIC PANEL: CPT

## 2020-12-09 PROBLEM — K76.0 FATTY LIVER: Status: ACTIVE | Noted: 2020-12-09

## 2020-12-28 ENCOUNTER — APPOINTMENT (OUTPATIENT)
Dept: LAB | Facility: HOSPITAL | Age: 65
End: 2020-12-28
Attending: FAMILY MEDICINE
Payer: COMMERCIAL

## 2020-12-28 PROCEDURE — 82274 ASSAY TEST FOR BLOOD FECAL: CPT

## 2020-12-29 ENCOUNTER — EKG ENCOUNTER (OUTPATIENT)
Dept: LAB | Age: 65
End: 2020-12-29
Attending: FAMILY MEDICINE
Payer: COMMERCIAL

## 2020-12-29 ENCOUNTER — OFFICE VISIT (OUTPATIENT)
Dept: FAMILY MEDICINE CLINIC | Facility: CLINIC | Age: 65
End: 2020-12-29
Payer: COMMERCIAL

## 2020-12-29 VITALS
OXYGEN SATURATION: 98 % | HEIGHT: 69 IN | DIASTOLIC BLOOD PRESSURE: 82 MMHG | WEIGHT: 188 LBS | BODY MASS INDEX: 27.85 KG/M2 | SYSTOLIC BLOOD PRESSURE: 128 MMHG | HEART RATE: 85 BPM

## 2020-12-29 DIAGNOSIS — I10 HYPERTENSION WITH GOAL BLOOD PRESSURE LESS THAN 140/90: ICD-10-CM

## 2020-12-29 DIAGNOSIS — E03.9 HYPOTHYROIDISM, UNSPECIFIED TYPE: ICD-10-CM

## 2020-12-29 DIAGNOSIS — F33.0 MILD EPISODE OF RECURRENT MAJOR DEPRESSIVE DISORDER (HCC): ICD-10-CM

## 2020-12-29 DIAGNOSIS — E78.2 MIXED HYPERLIPIDEMIA: ICD-10-CM

## 2020-12-29 DIAGNOSIS — I10 HYPERTENSION WITH GOAL BLOOD PRESSURE LESS THAN 140/90: Primary | ICD-10-CM

## 2020-12-29 DIAGNOSIS — K76.0 FATTY LIVER: ICD-10-CM

## 2020-12-29 DIAGNOSIS — E11.9 CONTROLLED TYPE 2 DIABETES MELLITUS WITHOUT COMPLICATION, WITHOUT LONG-TERM CURRENT USE OF INSULIN (HCC): ICD-10-CM

## 2020-12-29 DIAGNOSIS — Z23 NEED FOR VACCINATION: ICD-10-CM

## 2020-12-29 DIAGNOSIS — Z12.11 COLON CANCER SCREENING: ICD-10-CM

## 2020-12-29 LAB — HEMOCCULT STL QL: POSITIVE

## 2020-12-29 PROCEDURE — 90662 IIV NO PRSV INCREASED AG IM: CPT | Performed by: FAMILY MEDICINE

## 2020-12-29 PROCEDURE — 93005 ELECTROCARDIOGRAM TRACING: CPT

## 2020-12-29 PROCEDURE — 93010 ELECTROCARDIOGRAM REPORT: CPT | Performed by: FAMILY MEDICINE

## 2020-12-29 PROCEDURE — 99214 OFFICE O/P EST MOD 30 MIN: CPT | Performed by: FAMILY MEDICINE

## 2020-12-29 PROCEDURE — 3079F DIAST BP 80-89 MM HG: CPT | Performed by: FAMILY MEDICINE

## 2020-12-29 PROCEDURE — 3008F BODY MASS INDEX DOCD: CPT | Performed by: FAMILY MEDICINE

## 2020-12-29 PROCEDURE — 3074F SYST BP LT 130 MM HG: CPT | Performed by: FAMILY MEDICINE

## 2020-12-29 PROCEDURE — 90471 IMMUNIZATION ADMIN: CPT | Performed by: FAMILY MEDICINE

## 2020-12-29 RX ORDER — FLUOXETINE HYDROCHLORIDE 40 MG/1
40 CAPSULE ORAL DAILY
Qty: 90 CAPSULE | Refills: 0 | Status: SHIPPED | OUTPATIENT
Start: 2020-12-29 | End: 2021-05-12

## 2020-12-29 RX ORDER — PRAVASTATIN SODIUM 10 MG
10 TABLET ORAL NIGHTLY
Qty: 90 TABLET | Refills: 0 | Status: SHIPPED | OUTPATIENT
Start: 2020-12-29 | End: 2021-03-08

## 2020-12-29 NOTE — PATIENT INSTRUCTIONS
-Consider taking quinn chews with your Pravastatin to see if helps with some of your nausea   -Resume CoQ10 100-200 mg daily to help prevent leg cramps   Controlling Your Cholesterol  Cholesterol is a waxy substance.  It travels in your blood through the b sardines or mackerel per week . Most fish contain omega-3 fatty acids. These help lower total blood cholesterol. Omega-3 fatty acids lowers triglyceride levels, another form of fat in the blood.  If you are pregnant or thinking of becoming pregnant or are b professional's instructions.

## 2020-12-29 NOTE — PROGRESS NOTES
CC:  Patient presents with:   Follow - Up: pt here for follow up on blood test done 12/08/2020  Flu Shot: flu vaccine   Medication Question: FLUOXETINE HCL 20 MG Oral Cap pt would like to increase to 40 mg      HPI: 76year old female here to follow-up on r       Spouse name: Not on file      Number of children: Not on file      Years of education: Not on file      Highest education level: Not on file    Occupational History      Not on file    Social Needs      Financial resource strain: Not on file Tab TAKE 1 TABLET(25 MG) BY MOUTH TWICE DAILY 180 tablet 1   • METFORMIN HCL 1000 MG Oral Tab TAKE 1 TABLET BY MOUTH TWICE DAILY WITH MEALS 180 tablet 1   • FLUOXETINE HCL 20 MG Oral Cap TAKE 1 CAPSULE(20 MG) BY MOUTH TWICE DAILY 180 capsule 0   • Cholecal depressive disorder (HCC)    - Will increase Fluoxetine to 40 mg daily due to concerns for worsening depression and anxiety  - Notify me if not improving, worsening, or adverse side effects develop     5.  Fatty liver    - Needs to resume Pravastatin and im

## 2020-12-29 NOTE — PROGRESS NOTES
FLU VAC High Dose 65 YRS & Older PRSV Free (88625)  Flu shot administered in Left  arm IM  Patient denies allergy to eggs, flu shot before, being sick today, diagnosed with Guillain barre syndrome. Patient tolerated well no reaction at this time.   2 marisa

## 2020-12-30 DIAGNOSIS — R19.5 POSITIVE FIT (FECAL IMMUNOCHEMICAL TEST): Primary | ICD-10-CM

## 2021-02-06 DIAGNOSIS — Z23 NEED FOR VACCINATION: ICD-10-CM

## 2021-02-24 ENCOUNTER — OFFICE VISIT (OUTPATIENT)
Dept: GASTROENTEROLOGY | Facility: CLINIC | Age: 66
End: 2021-02-24
Payer: COMMERCIAL

## 2021-02-24 ENCOUNTER — TELEPHONE (OUTPATIENT)
Dept: GASTROENTEROLOGY | Facility: CLINIC | Age: 66
End: 2021-02-24

## 2021-02-24 VITALS
BODY MASS INDEX: 28.73 KG/M2 | DIASTOLIC BLOOD PRESSURE: 81 MMHG | HEIGHT: 69 IN | TEMPERATURE: 97 F | WEIGHT: 194 LBS | SYSTOLIC BLOOD PRESSURE: 115 MMHG | HEART RATE: 91 BPM

## 2021-02-24 DIAGNOSIS — R19.5 POSITIVE FIT (FECAL IMMUNOCHEMICAL TEST): Primary | ICD-10-CM

## 2021-02-24 PROCEDURE — 99243 OFF/OP CNSLTJ NEW/EST LOW 30: CPT | Performed by: NURSE PRACTITIONER

## 2021-02-24 PROCEDURE — 3079F DIAST BP 80-89 MM HG: CPT | Performed by: NURSE PRACTITIONER

## 2021-02-24 PROCEDURE — 3074F SYST BP LT 130 MM HG: CPT | Performed by: NURSE PRACTITIONER

## 2021-02-24 PROCEDURE — 3008F BODY MASS INDEX DOCD: CPT | Performed by: NURSE PRACTITIONER

## 2021-02-24 RX ORDER — SODIUM, POTASSIUM,MAG SULFATES 17.5-3.13G
SOLUTION, RECONSTITUTED, ORAL ORAL
Qty: 1 BOTTLE | Refills: 0 | Status: SHIPPED | OUTPATIENT
Start: 2021-02-24 | End: 2021-03-08 | Stop reason: ALTCHOICE

## 2021-02-24 NOTE — PATIENT INSTRUCTIONS
1. Schedule colonoscopy with mac w/ first avail [Diagnosis: pos fit]    2.  bowel prep from pharmacy (split suprep)    3. Hold metorfmin day before and day of procedure    4. Read all bowel prep instructions carefully    5.  AVOID seeds, nuts, popcor

## 2021-02-24 NOTE — TELEPHONE ENCOUNTER
PPD PA -     Patient is scheduled for colonoscopy next week, 3/4/2021. Can you please check for PA? Thank you!

## 2021-02-24 NOTE — H&P
[address] [facility] [address] Gastroenterology                                                                                                               Reason for consult: p TONSILLECTOMY     • TOTAL HIP REPLACEMENT Left 4/17/17    Dr. Mary Zelaya    • TOTAL HIP REPLACEMENT Right 06/21/2017    Dr. Mary Zelaya      Family Hx:   Family History   Problem Relation Age of Onset   • Heart Disorder Father    • Cancer Mother         Endometrial, dysuria and frequency  MUSCULOSKELETAL: Negative for arthralgias and myalgias  NEUROLOGICAL: Negative for dizziness and headaches  BEHAVIOR/PSYCH: Negative for anxiety and poor appetite    PHYSICAL EXAM:   Blood pressure 115/81, pulse 91, temperature 97.4 Time: 12/08/20  8:44 AM   Result Value Ref Range    Glucose 164 (H) 70 - 99 mg/dL    Sodium 138 136 - 145 mmol/L    Potassium 4.2 3.5 - 5.1 mmol/L    Chloride 104 98 - 112 mmol/L    CO2 27.0 21.0 - 32.0 mmol/L    Anion Gap 7 0 - 18 mmol/L    BUN 21 (H) 7 - procedure.   You will be contacted with instructions on how to do this.       >>>Please note: if you were prescribed Suprep for the bowel prep and it is too expensive or not covered by insurance, it is okay to substitute Trilyte (or any similar generic prep

## 2021-02-24 NOTE — TELEPHONE ENCOUNTER
Scheduled for:  Colonoscopy 26844  Provider Name:  Dr. Brad Kilgore  Date:  3/4/2021  Location:  [facility]  Sedation:  MAC  Time:  10:30 am, (pt is aware that [facility] [address] will call the day before to confirm arrival time)  Prep:  Suprep  Meds/Allergies Reconciled?:  Andreia/

## 2021-03-01 ENCOUNTER — LAB REQUISITION (OUTPATIENT)
Dept: LAB | Facility: HOSPITAL | Age: 66
End: 2021-03-01
Payer: COMMERCIAL

## 2021-03-01 DIAGNOSIS — Z01.818 ENCOUNTER FOR OTHER PREPROCEDURAL EXAMINATION: ICD-10-CM

## 2021-03-02 LAB — SARS-COV-2 RNA RESP QL NAA+PROBE: NOT DETECTED

## 2021-03-04 ENCOUNTER — LAB REQUISITION (OUTPATIENT)
Dept: LAB | Facility: HOSPITAL | Age: 66
End: 2021-03-04
Payer: COMMERCIAL

## 2021-03-04 ENCOUNTER — SURGERY CENTER DOCUMENTATION (OUTPATIENT)
Dept: SURGERY | Age: 66
End: 2021-03-04

## 2021-03-04 DIAGNOSIS — R19.5 OTHER FECAL ABNORMALITIES: ICD-10-CM

## 2021-03-04 PROCEDURE — 88305 TISSUE EXAM BY PATHOLOGIST: CPT | Performed by: INTERNAL MEDICINE

## 2021-03-04 NOTE — PROCEDURES
[facility]      COLONOSCOPY PROCEDURE REPORT     DATE OF PROCEDURE:  3/4/2021     PCP: Darvin Ahumada, DO     PREOPERATIVE DIAGNOSIS: Positive fecal occult blood test result     POSTOPERATIVE DIAGNOSIS:  See impression.      SURGEON:  ERROL site.  · 6 mm sessile polyp removed from the mid sigmoid by cold snare polypectomy, suctioned out. · No other colon polyps or neoplasm on a somewhat limited exam today as above. · Small internal hemorrhoids. RECOMMENDATIONS:  · High fiber diet.   · Esta Feeling

## 2021-03-05 ENCOUNTER — TELEPHONE (OUTPATIENT)
Dept: GASTROENTEROLOGY | Facility: CLINIC | Age: 66
End: 2021-03-05

## 2021-03-05 NOTE — TELEPHONE ENCOUNTER
Called pt LMTCB- if she CB you can offer her a sooner appt w/ VT on 3/21/19 @ 1pm, 2pm, or 3pm slot. Problem: Patient Education:  Go to Education Activity  Goal: Patient/Family Education  Outcome: Resolved/Not Met     Problem: Falls - Risk of  Goal: *Absence of Falls  Description: Document Earma Bahman Fall Risk and appropriate interventions in the flowsheet. Outcome: Resolved/Not Met  Note: Fall Risk Interventions:  Mobility Interventions: Bed/chair exit alarm    Mentation Interventions: Adequate sleep, hydration, pain control    Medication Interventions: Bed/chair exit alarm    Elimination Interventions: Bed/chair exit alarm    History of Falls Interventions: Bed/chair exit alarm         Problem: Patient Education: Go to Patient Education Activity  Goal: Patient/Family Education  Outcome: Resolved/Not Met     Problem: Pressure Injury - Risk of  Goal: *Prevention of pressure injury  Description: Document Cruzito Scale and appropriate interventions in the flowsheet.   Outcome: Resolved/Not Met  Note: Pressure Injury Interventions:  Sensory Interventions: Assess need for specialty bed    Moisture Interventions: Check for incontinence Q2 hours and as needed    Activity Interventions: Pressure redistribution bed/mattress(bed type)    Mobility Interventions: Pressure redistribution bed/mattress (bed type)    Nutrition Interventions: Document food/fluid/supplement intake    Friction and Shear Interventions: HOB 30 degrees or less                Problem: Patient Education: Go to Patient Education Activity  Goal: Patient/Family Education  Outcome: Resolved/Not Met     Problem: Nutrition Deficit  Goal: *Optimize nutritional status  Outcome: Resolved/Not Met     Problem: Nutrition Deficit  Goal: *Optimize nutritional status  Outcome: Resolved/Not Met     Problem: Breathing Pattern - Ineffective  Goal: *Absence of hypoxia  Outcome: Resolved/Not Met  Goal: *Use of effective breathing techniques  Outcome: Resolved/Not Met  Goal: *PALLIATIVE CARE:  Alleviation of Dyspnea  Outcome: Resolved/Not Met     Problem: Patient Education: Go to Patient Education Activity  Goal: Patient/Family Education  Outcome: Resolved/Not Met     Problem: Patient Education: Go to Patient Education Activity  Goal: Patient/Family Education  Outcome: Resolved/Not Met     Problem: Patient Education: Go to Patient Education Activity  Goal: Patient/Family Education  Outcome: Resolved/Not Met     Problem: Patient Education: Go to Patient Education Activity  Goal: Patient/Family Education  Outcome: Resolved/Not Met     Problem: Patient Education: Go to Patient Education Activity  Goal: Patient/Family Education  Outcome: Resolved/Not Met     Problem: Airway Clearance - Ineffective  Goal: Achieve or maintain patent airway  Outcome: Resolved/Not Met     Problem: Gas Exchange - Impaired  Goal: Absence of hypoxia  Outcome: Resolved/Not Met  Goal: Promote optimal lung function  Outcome: Resolved/Not Met     Problem: Breathing Pattern - Ineffective  Goal: Ability to achieve and maintain a regular respiratory rate  Outcome: Resolved/Not Met     Problem: Body Temperature -  Risk of, Imbalanced  Goal: Ability to maintain a body temperature within defined limits  Outcome: Resolved/Not Met  Goal: Will regain or maintain usual level of consciousness  Outcome: Resolved/Not Met  Goal: Complications related to the disease process, condition or treatment will be avoided or minimized  Outcome: Resolved/Not Met     Problem:  Body Temperature -  Risk of, Imbalanced  Goal: Ability to maintain a body temperature within defined limits  Outcome: Resolved/Not Met  Goal: Will regain or maintain usual level of consciousness  Outcome: Resolved/Not Met  Goal: Complications related to the disease process, condition or treatment will be avoided or minimized  Outcome: Resolved/Not Met     Problem: Nutrition Deficits  Goal: Optimize nutrtional status  Outcome: Resolved/Not Met     Problem: Loneliness or Risk for Loneliness  Goal: Demonstrate positive use of time alone when socialization is not possible  Outcome: Resolved/Not Met

## 2021-03-05 NOTE — TELEPHONE ENCOUNTER
Dr Marcelo Parrish called me today with the results on yesterday's polypectomy specimen. 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 Significant cautery artifact/fragmented specimen from piecemeal removal and suctioning out consistent with the difficulty we had removing this polyp making it impossible to rule out invasive carcinoma. No invasive carcinoma definitively seen. [phone]    I called Ms Dominga Owen and left a voicemail. Unclear whether there could be small foci of invasive carcinoma in this polyp which was very difficult to raise up with the submucosal saline injection. It did seem like it was anchored to the wall of the colon during yesterday's resection. Options here would include repeating colonoscopy examination in 4-6 months to reassess this site or proceed directly to right hemicolectomy resection surgery.

## 2021-03-08 ENCOUNTER — OFFICE VISIT (OUTPATIENT)
Dept: FAMILY MEDICINE CLINIC | Facility: CLINIC | Age: 66
End: 2021-03-08
Payer: COMMERCIAL

## 2021-03-08 VITALS
BODY MASS INDEX: 28.73 KG/M2 | OXYGEN SATURATION: 98 % | WEIGHT: 194 LBS | HEART RATE: 104 BPM | DIASTOLIC BLOOD PRESSURE: 70 MMHG | HEIGHT: 69 IN | SYSTOLIC BLOOD PRESSURE: 114 MMHG

## 2021-03-08 DIAGNOSIS — M79.672 LEFT FOOT PAIN: ICD-10-CM

## 2021-03-08 DIAGNOSIS — E11.9 CONTROLLED TYPE 2 DIABETES MELLITUS WITHOUT COMPLICATION, WITHOUT LONG-TERM CURRENT USE OF INSULIN (HCC): ICD-10-CM

## 2021-03-08 DIAGNOSIS — D01.0 CARCINOMA IN SITU OF COLON: ICD-10-CM

## 2021-03-08 DIAGNOSIS — E55.9 VITAMIN D DEFICIENCY: ICD-10-CM

## 2021-03-08 DIAGNOSIS — E03.9 HYPOTHYROIDISM, UNSPECIFIED TYPE: ICD-10-CM

## 2021-03-08 DIAGNOSIS — Z12.31 SCREENING MAMMOGRAM, ENCOUNTER FOR: ICD-10-CM

## 2021-03-08 DIAGNOSIS — Z00.01 ENCOUNTER FOR ROUTINE ADULT HEALTH EXAMINATION WITH ABNORMAL FINDINGS: Primary | ICD-10-CM

## 2021-03-08 PROCEDURE — 99397 PER PM REEVAL EST PAT 65+ YR: CPT | Performed by: FAMILY MEDICINE

## 2021-03-08 PROCEDURE — 3078F DIAST BP <80 MM HG: CPT | Performed by: FAMILY MEDICINE

## 2021-03-08 PROCEDURE — 3074F SYST BP LT 130 MM HG: CPT | Performed by: FAMILY MEDICINE

## 2021-03-08 PROCEDURE — 99213 OFFICE O/P EST LOW 20 MIN: CPT | Performed by: FAMILY MEDICINE

## 2021-03-08 PROCEDURE — 3008F BODY MASS INDEX DOCD: CPT | Performed by: FAMILY MEDICINE

## 2021-03-08 RX ORDER — PRAVASTATIN SODIUM 20 MG
20 TABLET ORAL NIGHTLY
COMMUNITY
Start: 2021-03-08 | End: 2021-04-05

## 2021-03-08 NOTE — PATIENT INSTRUCTIONS
Prevention Guidelines, Women Ages 72 and Older  Screening tests and vaccines are an important part of managing your health. A screening test is done to find possible disorders or diseases in people who don't have any symptoms.  The goal is to find a disease tests are available and are used at different times.  Possible tests include:  · Flexible sigmoidoscopy every 5 years, or  · Colonoscopy every 10 years, or  · CT colonography (virtual colonoscopy) every 5 years, or  · Yearly fecal occult blood test, or  · Y (TSH) Only women in this age group with symptoms of thyroid dysfunction Talk with your healthcare provider   Tuberculosis Women at increased risk for infection Talk with your healthcare provider   Vision All women in this age group Every 1 to 2 years; if y first. This is given even if you've had shingles before or had a previous zoster live vaccine. · Zoster live vaccine live (ZVL; Zostavax) may be given to healthy adults over age 61. It's given in one dose.    Counseling Who needs it How often   Diet and ex

## 2021-03-08 NOTE — PROGRESS NOTES
HPI:   Cici Alonso is a 76year old female who presents for a complete physical exam.     Had colonoscopy last week which found a polyp consistent with carcinoma in situ. Did not identify invasive carcinoma but could not rule it out either.  Maximo Medication Sig Dispense Refill   • Pravastatin Sodium 20 MG Oral Tab Take 1 tablet (20 mg total) by mouth nightly. • Verapamil HCl  MG Oral Tab CR Take 1 tablet (120 mg total) by mouth daily.  90 tablet 0   • FLUoxetine HCl 40 MG Oral Cap Take 1 daughter and two sons).          EXAM:   Wt Readings from Last 6 Encounters:  03/08/21 : 194 lb (88 kg)  02/24/21 : 194 lb (88 kg)  12/29/20 : 188 lb (85.3 kg)  02/25/20 : 186 lb (84.4 kg)  03/19/19 : 180 lb (81.6 kg)  03/13/19 : 189 lb (85.7 kg)    Body ma submitted. Urged to call and schedule ASAP as decision needs to be made soon. Foot pain has mostly resolved but given bruising reported as well as level of pain, will obtain x-ray. Type 2 DM well controlled based on last A1C. Continue Metformin.  Due for

## 2021-03-22 ENCOUNTER — TELEPHONE (OUTPATIENT)
Dept: GASTROENTEROLOGY | Facility: CLINIC | Age: 66
End: 2021-03-22

## 2021-03-22 NOTE — TELEPHONE ENCOUNTER
A refill request was received for:  Requested Prescriptions     Pending Prescriptions Disp Refills   • Verapamil HCl  MG Oral Tab CR 90 tablet 0     Sig: Take 1 tablet (120 mg total) by mouth daily.    • metFORMIN HCl 1000 MG Oral Tab 180 tablet 1

## 2021-03-22 NOTE — TELEPHONE ENCOUNTER
[phone]    I did call back and reach Ms. Lorie Martin on 3/5/2021 after the initial entry into that encounter. We discussed sessile high risk polyp which was difficult to raise up with the submucosal saline injection. I was very concerned that day that we may not have resected the whole thing. We discussed preemptive right hemicolectomy surgery versus close observation, repeat colonoscopy examination in 3-6 months. She wanted to consider, follow-up with Dr. Dre Valencia which she did on 3/8/2021. Sounds like she is going for second opinion. I called to follow-up on 3/22/2021 reached voicemail, left a brief.

## 2021-04-02 ENCOUNTER — LAB ENCOUNTER (OUTPATIENT)
Dept: LAB | Facility: REFERENCE LAB | Age: 66
End: 2021-04-02
Attending: FAMILY MEDICINE
Payer: COMMERCIAL

## 2021-04-02 DIAGNOSIS — E03.9 HYPOTHYROIDISM, UNSPECIFIED TYPE: ICD-10-CM

## 2021-04-02 DIAGNOSIS — E55.9 VITAMIN D DEFICIENCY: ICD-10-CM

## 2021-04-02 DIAGNOSIS — Z00.01 ENCOUNTER FOR ROUTINE ADULT HEALTH EXAMINATION WITH ABNORMAL FINDINGS: ICD-10-CM

## 2021-04-02 PROCEDURE — 80053 COMPREHEN METABOLIC PANEL: CPT

## 2021-04-02 PROCEDURE — 36415 COLL VENOUS BLD VENIPUNCTURE: CPT

## 2021-04-02 PROCEDURE — 82043 UR ALBUMIN QUANTITATIVE: CPT

## 2021-04-02 PROCEDURE — 82306 VITAMIN D 25 HYDROXY: CPT

## 2021-04-02 PROCEDURE — 80061 LIPID PANEL: CPT

## 2021-04-02 PROCEDURE — 84439 ASSAY OF FREE THYROXINE: CPT

## 2021-04-02 PROCEDURE — 82570 ASSAY OF URINE CREATININE: CPT

## 2021-04-02 PROCEDURE — 84443 ASSAY THYROID STIM HORMONE: CPT

## 2021-04-04 ENCOUNTER — PATIENT MESSAGE (OUTPATIENT)
Dept: FAMILY MEDICINE CLINIC | Facility: CLINIC | Age: 66
End: 2021-04-04

## 2021-04-05 PROBLEM — R80.9 POSITIVE FOR MACROALBUMINURIA: Status: ACTIVE | Noted: 2021-04-05

## 2021-04-05 NOTE — TELEPHONE ENCOUNTER
From: Ilene Crooks  To: Gianni Vinson DO  Sent: 4/4/2021 3:36 PM CDT  Subject: Test Results Question    Hi Dr. Pretty Reeves,    I saw the results of my urine analysis and am very concerned. Should I be? What is my next step?  I am supposed to return to work with

## 2021-05-04 ENCOUNTER — TELEPHONE (OUTPATIENT)
Dept: FAMILY MEDICINE CLINIC | Facility: CLINIC | Age: 66
End: 2021-05-04

## 2021-05-04 NOTE — TELEPHONE ENCOUNTER
Representative calling from [facility]card[facility] Hamman to follow up if patient is following up on colon cancer. The process with Riccardo Hamman is to call the patient twice and if no answer they call the PCP.

## 2021-05-04 NOTE — TELEPHONE ENCOUNTER
LMTCB          Last visit 03/08/21 Dr. Baljinder Burt  Had colonoscopy last week which found a polyp consistent with carcinoma in situ. Did not identify invasive carcinoma but could not rule it out either.  Considering repeating colonoscopy in 4-6 months or hemicolec

## 2021-05-12 RX ORDER — FLUOXETINE HYDROCHLORIDE 40 MG/1
40 CAPSULE ORAL DAILY
Qty: 90 CAPSULE | Refills: 1 | Status: SHIPPED | OUTPATIENT
Start: 2021-05-12 | End: 2021-11-05

## 2021-05-12 RX ORDER — PRAVASTATIN SODIUM 40 MG
40 TABLET ORAL NIGHTLY
Qty: 90 TABLET | Refills: 1 | Status: SHIPPED | OUTPATIENT
Start: 2021-05-12 | End: 2021-08-03

## 2021-05-12 NOTE — TELEPHONE ENCOUNTER
A refill request was received for:  Requested Prescriptions     Pending Prescriptions Disp Refills   • FLUoxetine HCl 40 MG Oral Cap 90 capsule 0     Sig: Take 1 capsule (40 mg total) by mouth daily.    • Pravastatin Sodium 40 MG Oral Tab 90 tablet 0     Si

## 2021-07-12 ENCOUNTER — LAB ENCOUNTER (OUTPATIENT)
Dept: LAB | Age: 66
End: 2021-07-12
Attending: FAMILY MEDICINE
Payer: COMMERCIAL

## 2021-07-12 DIAGNOSIS — E11.9 CONTROLLED TYPE 2 DIABETES MELLITUS WITHOUT COMPLICATION, WITHOUT LONG-TERM CURRENT USE OF INSULIN (HCC): ICD-10-CM

## 2021-07-12 LAB
EST. AVERAGE GLUCOSE BLD GHB EST-MCNC: 154 MG/DL (ref 68–126)
HBA1C MFR BLD HPLC: 7 % (ref ?–5.7)

## 2021-07-12 PROCEDURE — 83036 HEMOGLOBIN GLYCOSYLATED A1C: CPT

## 2021-07-12 PROCEDURE — 36415 COLL VENOUS BLD VENIPUNCTURE: CPT

## 2021-07-12 PROCEDURE — 3051F HG A1C>EQUAL 7.0%<8.0%: CPT | Performed by: FAMILY MEDICINE

## 2021-08-03 ENCOUNTER — TELEMEDICINE (OUTPATIENT)
Dept: FAMILY MEDICINE CLINIC | Facility: CLINIC | Age: 66
End: 2021-08-03
Payer: COMMERCIAL

## 2021-08-03 DIAGNOSIS — D01.0 CARCINOMA IN SITU OF COLON: Primary | ICD-10-CM

## 2021-08-03 DIAGNOSIS — E03.9 HYPOTHYROIDISM, UNSPECIFIED TYPE: ICD-10-CM

## 2021-08-03 DIAGNOSIS — R80.9 POSITIVE FOR MACROALBUMINURIA: ICD-10-CM

## 2021-08-03 DIAGNOSIS — E78.2 MIXED HYPERLIPIDEMIA: ICD-10-CM

## 2021-08-03 DIAGNOSIS — E11.9 CONTROLLED TYPE 2 DIABETES MELLITUS WITHOUT COMPLICATION, WITHOUT LONG-TERM CURRENT USE OF INSULIN (HCC): ICD-10-CM

## 2021-08-03 PROCEDURE — 99213 OFFICE O/P EST LOW 20 MIN: CPT | Performed by: FAMILY MEDICINE

## 2021-08-03 RX ORDER — PRAVASTATIN SODIUM 40 MG
40 TABLET ORAL NIGHTLY
Qty: 90 TABLET | Refills: 1 | Status: SHIPPED | OUTPATIENT
Start: 2021-08-03 | End: 2022-02-01

## 2021-08-03 NOTE — PROGRESS NOTES
CC:  Patient presents with:  Results      HPI: 76year old female presenting for video visit to discuss lab results and follow-up after abnormal colonoscopy in March.    Reports she has lost about 30 pounds over the past month or two with cutting back on ca Asked        Special Diet: Not Asked        Stress Concern: Not Asked        Weight Concern: Not Asked    Social History Narrative      Not on file    Social Determinants of Health  Financial Resource Strain:       Difficulty of Paying Living Expenses:   F mouth daily. 0   • COENZYME Q-10 OR Take 1 tablet by mouth daily. • MAGNESIUM OR Take by mouth. • Ascorbic Acid (VITAMIN C OR) Take by mouth. • Vitamin B-12 500 MCG Oral Tab Take 500 mcg by mouth daily.          Amoxicillin and Sulfa Antibiotic this visit as no physical exam could be performed. Every conscious effort was taken to allow for sufficient and adequate time. This billing was spent on reviewing labs, medications, radiology tests and decision making.  Appropriate medical decision-making a

## 2021-08-10 RX ORDER — THYROID 120 MG/1
120 TABLET ORAL DAILY
Qty: 30 TABLET | Refills: 0 | Status: SHIPPED | OUTPATIENT
Start: 2021-08-10 | End: 2021-09-25

## 2021-08-10 RX ORDER — THYROID 15 MG/1
TABLET ORAL
Qty: 30 TABLET | Refills: 0 | Status: SHIPPED | OUTPATIENT
Start: 2021-08-10 | End: 2021-09-25

## 2021-09-23 NOTE — TELEPHONE ENCOUNTER
A refill request was received for:  Requested Prescriptions     Pending Prescriptions Disp Refills   • VERAPAMIL 120 MG Oral Tab CR [Pharmacy Med Name: VERAPAMIL ER 120MG TABLETS] 90 tablet 1     Sig: TAKE 1 TABLET(120 MG) BY MOUTH DAILY   • METFORMIN HCL

## 2021-09-24 ENCOUNTER — LAB ENCOUNTER (OUTPATIENT)
Dept: LAB | Age: 66
End: 2021-09-24
Attending: FAMILY MEDICINE
Payer: COMMERCIAL

## 2021-09-24 DIAGNOSIS — E03.9 HYPOTHYROIDISM, UNSPECIFIED TYPE: ICD-10-CM

## 2021-09-24 DIAGNOSIS — E11.9 CONTROLLED TYPE 2 DIABETES MELLITUS WITHOUT COMPLICATION, WITHOUT LONG-TERM CURRENT USE OF INSULIN (HCC): ICD-10-CM

## 2021-09-24 LAB
ALBUMIN SERPL-MCNC: 3.9 G/DL (ref 3.4–5)
ALBUMIN/GLOB SERPL: 1 {RATIO} (ref 1–2)
ALP LIVER SERPL-CCNC: 93 U/L
ALT SERPL-CCNC: 67 U/L
ANION GAP SERPL CALC-SCNC: 6 MMOL/L (ref 0–18)
AST SERPL-CCNC: 35 U/L (ref 15–37)
BILIRUB SERPL-MCNC: 0.3 MG/DL (ref 0.1–2)
BUN BLD-MCNC: 16 MG/DL (ref 7–18)
BUN/CREAT SERPL: 32 (ref 10–20)
CALCIUM BLD-MCNC: 9 MG/DL (ref 8.5–10.1)
CHLORIDE SERPL-SCNC: 107 MMOL/L (ref 98–112)
CO2 SERPL-SCNC: 27 MMOL/L (ref 21–32)
CREAT BLD-MCNC: 0.5 MG/DL
CREAT UR-SCNC: 43 MG/DL
GLOBULIN PLAS-MCNC: 4.1 G/DL (ref 2.8–4.4)
GLUCOSE BLD-MCNC: 112 MG/DL (ref 70–99)
MICROALBUMIN UR-MCNC: 7.48 MG/DL
MICROALBUMIN/CREAT 24H UR-RTO: 174 UG/MG (ref ?–30)
OSMOLALITY SERPL CALC.SUM OF ELEC: 292 MOSM/KG (ref 275–295)
PATIENT FASTING Y/N/NP: YES
POTASSIUM SERPL-SCNC: 4.1 MMOL/L (ref 3.5–5.1)
PROT SERPL-MCNC: 8 G/DL (ref 6.4–8.2)
SODIUM SERPL-SCNC: 140 MMOL/L (ref 136–145)
TSI SER-ACNC: 1.76 MIU/ML (ref 0.36–3.74)

## 2021-09-24 PROCEDURE — 82570 ASSAY OF URINE CREATININE: CPT

## 2021-09-24 PROCEDURE — 82043 UR ALBUMIN QUANTITATIVE: CPT

## 2021-09-24 PROCEDURE — 80053 COMPREHEN METABOLIC PANEL: CPT

## 2021-09-24 PROCEDURE — 3060F POS MICROALBUMINURIA REV: CPT | Performed by: FAMILY MEDICINE

## 2021-09-24 PROCEDURE — 3061F NEG MICROALBUMINURIA REV: CPT | Performed by: FAMILY MEDICINE

## 2021-09-24 PROCEDURE — 84443 ASSAY THYROID STIM HORMONE: CPT

## 2021-09-24 PROCEDURE — 36415 COLL VENOUS BLD VENIPUNCTURE: CPT

## 2021-09-25 PROBLEM — R80.9 CONTROLLED TYPE 2 DIABETES MELLITUS WITH MICROALBUMINURIA, WITHOUT LONG-TERM CURRENT USE OF INSULIN  (HCC): Status: ACTIVE | Noted: 2017-03-29

## 2021-09-25 PROBLEM — E11.29 CONTROLLED TYPE 2 DIABETES MELLITUS WITH MICROALBUMINURIA, WITHOUT LONG-TERM CURRENT USE OF INSULIN: Status: ACTIVE | Noted: 2017-03-29

## 2021-09-25 PROBLEM — R80.9 CONTROLLED TYPE 2 DIABETES MELLITUS WITH MICROALBUMINURIA, WITHOUT LONG-TERM CURRENT USE OF INSULIN: Status: ACTIVE | Noted: 2017-03-29

## 2021-09-25 PROBLEM — E11.29 CONTROLLED TYPE 2 DIABETES MELLITUS WITH MICROALBUMINURIA, WITHOUT LONG-TERM CURRENT USE OF INSULIN  (HCC): Status: ACTIVE | Noted: 2017-03-29

## 2021-09-25 PROBLEM — E11.29 CONTROLLED TYPE 2 DIABETES MELLITUS WITH MICROALBUMINURIA, WITHOUT LONG-TERM CURRENT USE OF INSULIN (HCC): Status: ACTIVE | Noted: 2017-03-29

## 2021-09-25 PROBLEM — R80.9 CONTROLLED TYPE 2 DIABETES MELLITUS WITH MICROALBUMINURIA, WITHOUT LONG-TERM CURRENT USE OF INSULIN (HCC): Status: ACTIVE | Noted: 2017-03-29

## 2021-10-18 ENCOUNTER — TELEPHONE (OUTPATIENT)
Dept: OBGYN CLINIC | Facility: CLINIC | Age: 66
End: 2021-10-18

## 2021-10-18 NOTE — TELEPHONE ENCOUNTER
Called pt and scheduled with female provider for 11/15/21,  Pt verbalized understanding and agrees with POC.

## 2021-10-18 NOTE — TELEPHONE ENCOUNTER
Patient is calling requesting a sooner appointment than what's on the schedule. She was referred by Dr Giovanni Mason. She has been bleeding for a couple of weeks now.  She would be a new patient to the OB's but was told she needs to be seen as soon as alton

## 2021-10-26 ENCOUNTER — OFFICE VISIT (OUTPATIENT)
Dept: FAMILY MEDICINE CLINIC | Facility: CLINIC | Age: 66
End: 2021-10-26
Payer: COMMERCIAL

## 2021-10-26 VITALS
HEART RATE: 89 BPM | OXYGEN SATURATION: 96 % | BODY MASS INDEX: 25.77 KG/M2 | SYSTOLIC BLOOD PRESSURE: 116 MMHG | WEIGHT: 174 LBS | HEIGHT: 69 IN | DIASTOLIC BLOOD PRESSURE: 72 MMHG

## 2021-10-26 DIAGNOSIS — N89.8 VAGINAL ITCHING: ICD-10-CM

## 2021-10-26 DIAGNOSIS — E03.9 HYPOTHYROIDISM, UNSPECIFIED TYPE: ICD-10-CM

## 2021-10-26 DIAGNOSIS — R80.9 CONTROLLED TYPE 2 DIABETES MELLITUS WITH MICROALBUMINURIA, WITHOUT LONG-TERM CURRENT USE OF INSULIN (HCC): Primary | ICD-10-CM

## 2021-10-26 DIAGNOSIS — E11.29 CONTROLLED TYPE 2 DIABETES MELLITUS WITH MICROALBUMINURIA, WITHOUT LONG-TERM CURRENT USE OF INSULIN (HCC): Primary | ICD-10-CM

## 2021-10-26 DIAGNOSIS — N93.9 VAGINAL SPOTTING: ICD-10-CM

## 2021-10-26 DIAGNOSIS — Z23 NEED FOR VACCINATION: ICD-10-CM

## 2021-10-26 PROCEDURE — 83036 HEMOGLOBIN GLYCOSYLATED A1C: CPT | Performed by: FAMILY MEDICINE

## 2021-10-26 PROCEDURE — 90662 IIV NO PRSV INCREASED AG IM: CPT | Performed by: FAMILY MEDICINE

## 2021-10-26 PROCEDURE — 99214 OFFICE O/P EST MOD 30 MIN: CPT | Performed by: FAMILY MEDICINE

## 2021-10-26 PROCEDURE — 3074F SYST BP LT 130 MM HG: CPT | Performed by: FAMILY MEDICINE

## 2021-10-26 PROCEDURE — 3008F BODY MASS INDEX DOCD: CPT | Performed by: FAMILY MEDICINE

## 2021-10-26 PROCEDURE — 3078F DIAST BP <80 MM HG: CPT | Performed by: FAMILY MEDICINE

## 2021-10-26 PROCEDURE — 90471 IMMUNIZATION ADMIN: CPT | Performed by: FAMILY MEDICINE

## 2021-10-26 RX ORDER — FLUCONAZOLE 150 MG/1
TABLET ORAL
Qty: 2 TABLET | Refills: 0 | Status: SHIPPED | OUTPATIENT
Start: 2021-10-26 | End: 2021-12-17

## 2021-10-26 RX ORDER — LEVOTHYROXINE AND LIOTHYRONINE 76; 18 UG/1; UG/1
120 TABLET ORAL EVERY MORNING
Qty: 90 TABLET | Refills: 2 | COMMUNITY
Start: 2021-10-26

## 2021-10-26 NOTE — PROGRESS NOTES
CC:  Patient presents with: Follow - Up: would like a flu shot      HPI: 78year old female here for follow-up. Has lost a lot of weight in the last few months after cutting out junk food.   Also back to work and more active in general.  Has had light vag     Difficulty of Paying Living Expenses: Not on file  Food Insecurity:       Worried About [address] in the Last Year: Not on file      Ran Out of Food in the Last Year: Not on file  Transportation Needs:       Lack of Transportation (Medical) Cholecalciferol (VITAMIN D-3 OR) Take 15,000 Units by mouth daily. • aspirin 81 MG Oral Tab Take 1 tablet (81 mg total) by mouth daily. 0   • COENZYME Q-10 OR Take 1 tablet by mouth daily. • MAGNESIUM OR Take by mouth.      • Ascorbic Acid (VITAMI candida due to increased moisture from vaginal bleeding  - Will start Fluconazole 150 mg x 1 now and 2nd dose in 72 hours and follow-up with gynecology if not improving     5.  Need for vaccination    - FLU VACC HIGH DOSE [address], DO  1

## 2021-11-04 ENCOUNTER — HOSPITAL ENCOUNTER (OUTPATIENT)
Dept: ULTRASOUND IMAGING | Age: 66
Discharge: HOME | End: 2021-11-04
Attending: FAMILY MEDICINE
Payer: COMMERCIAL

## 2021-11-04 DIAGNOSIS — N93.9 VAGINAL SPOTTING: ICD-10-CM

## 2021-11-04 PROCEDURE — 76830 TRANSVAGINAL US NON-OB: CPT | Performed by: FAMILY MEDICINE

## 2021-11-04 PROCEDURE — 76856 US EXAM PELVIC COMPLETE: CPT | Performed by: FAMILY MEDICINE

## 2021-11-05 RX ORDER — FLUOXETINE HYDROCHLORIDE 40 MG/1
CAPSULE ORAL
Qty: 90 CAPSULE | Refills: 1 | Status: SHIPPED | OUTPATIENT
Start: 2021-11-05

## 2021-11-05 NOTE — TELEPHONE ENCOUNTER
A refill request was received for:  Requested Prescriptions     Pending Prescriptions Disp Refills   • FLUOXETINE HCL 40 MG Oral Cap [Pharmacy Med Name: FLUOXETINE 40MG CAPSULES] 90 capsule 1     Sig: TAKE 1 CAPSULE(40 MG) BY MOUTH DAILY     Last refill da

## 2021-11-15 ENCOUNTER — OFFICE VISIT (OUTPATIENT)
Dept: OBGYN CLINIC | Facility: CLINIC | Age: 66
End: 2021-11-15
Payer: COMMERCIAL

## 2021-11-15 VITALS
SYSTOLIC BLOOD PRESSURE: 130 MMHG | DIASTOLIC BLOOD PRESSURE: 74 MMHG | BODY MASS INDEX: 26.07 KG/M2 | HEIGHT: 69 IN | WEIGHT: 176 LBS

## 2021-11-15 DIAGNOSIS — L90.0 LICHEN SCLEROSUS: ICD-10-CM

## 2021-11-15 DIAGNOSIS — Z12.31 ENCOUNTER FOR SCREENING MAMMOGRAM FOR MALIGNANT NEOPLASM OF BREAST: ICD-10-CM

## 2021-11-15 DIAGNOSIS — N95.0 POSTMENOPAUSAL BLEEDING: Primary | ICD-10-CM

## 2021-11-15 PROCEDURE — 99204 OFFICE O/P NEW MOD 45 MIN: CPT | Performed by: OBSTETRICS & GYNECOLOGY

## 2021-11-15 PROCEDURE — 3078F DIAST BP <80 MM HG: CPT | Performed by: OBSTETRICS & GYNECOLOGY

## 2021-11-15 PROCEDURE — 58100 BIOPSY OF UTERUS LINING: CPT | Performed by: OBSTETRICS & GYNECOLOGY

## 2021-11-15 PROCEDURE — 87624 HPV HI-RISK TYP POOLED RSLT: CPT | Performed by: OBSTETRICS & GYNECOLOGY

## 2021-11-15 PROCEDURE — 3075F SYST BP GE 130 - 139MM HG: CPT | Performed by: OBSTETRICS & GYNECOLOGY

## 2021-11-15 PROCEDURE — 3008F BODY MASS INDEX DOCD: CPT | Performed by: OBSTETRICS & GYNECOLOGY

## 2021-11-15 RX ORDER — CLOBETASOL PROPIONATE 0.5 MG/G
OINTMENT TOPICAL
Qty: 30 G | Refills: 3 | Status: SHIPPED | OUTPATIENT
Start: 2021-11-15 | End: 2021-12-17

## 2021-11-15 NOTE — PROGRESS NOTES
GYN H&P     11/15/2021  12:58 PM    CC: Patient is here for PMB. Patient of Dr. Becca Larios. HPI: Patient is a 78year old  for 1 M h/o vaginal spotting, never heavy, no pain. No hormones or steroids. Had USN which showed lining was thickened.  She had leukemia   • Heart Disease Paternal Grandmother    • Stroke Paternal Grandmother 76   • Lung Disorder Paternal Grandfather         emphysema   • Breast Cancer Neg    • Ovarian Cancer Neg      Social History    Socioeconomic History      Marital status: Div     PATIENT STATED HISTORY: (As transcribed by Technologist) Beckie Suarez has vaginal spotting.            FINDINGS:     Uterus measures 4.8 x 2.5 x 4.5 cm.  The endometrial stripe measures up to 9 mm with a rounded area which appears more focally hyperechoi

## 2021-11-17 ENCOUNTER — TELEPHONE (OUTPATIENT)
Dept: OBGYN CLINIC | Facility: CLINIC | Age: 66
End: 2021-11-17

## 2021-11-18 ENCOUNTER — TELEPHONE (OUTPATIENT)
Dept: GASTROENTEROLOGY | Facility: CLINIC | Age: 66
End: 2021-11-18

## 2021-11-18 NOTE — TELEPHONE ENCOUNTER
Last Procedure, Date, MD: Colonoscopy, 3/4/21, Dr. 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 Issues/Oxygen Use/MISA/COPD: n/a  Issues w/Anesthesia: n/a    Symptoms (Y/N): N  Symptoms Details: n/a     Special comments/notes: verified allergies, medications, pharmacy. Patient would like to schedule with Dr. Michelle Hinojosa    Please advise on orders and prep, thank you.

## 2021-11-18 NOTE — TELEPHONE ENCOUNTER
Pt called to schedule repeat colonoscopy. Pt originally had procedure with Dr. Ilya Figueroa but would like Dr. Yin Hayes to do repeat procedure. Pt has also seen Sallyann Saint in the office. Please call.

## 2021-11-19 ENCOUNTER — PATIENT MESSAGE (OUTPATIENT)
Dept: OBGYN CLINIC | Facility: CLINIC | Age: 66
End: 2021-11-19

## 2021-11-19 ENCOUNTER — TELEPHONE (OUTPATIENT)
Dept: OBGYN CLINIC | Facility: CLINIC | Age: 66
End: 2021-11-19

## 2021-11-19 NOTE — TELEPHONE ENCOUNTER
Called pt and informed results are release before LC has had an opportunity to review. Will route to [facility], pt verbalized understanding.         Nirali Newell  to Christine Cheung MD    SP      11/19/21 10:24 AM  The results of my biopsy indica

## 2021-11-19 NOTE — TELEPHONE ENCOUNTER
See telephone encounter 11/19/21      From: Niharika Rodriguez  To: Nilay Vázquez MD  Sent: 11/19/2021 10:27 AM CST  Subject: Biopsy results    My biopsy results indicate that I have chronic endometritis.   Do I need an antibiotic to clear th

## 2021-11-19 NOTE — TELEPHONE ENCOUNTER
Pt states that she was supposed to be prescribed antibiotic following endometrial biopsy 11/15. Never received. States she picked up an ointment, but it was for something different.       Pharmacy    [address] [address]

## 2021-11-22 RX ORDER — POLYETHYLENE GLYCOL 3350, SODIUM CHLORIDE, SODIUM BICARBONATE, POTASSIUM CHLORIDE 420; 11.2; 5.72; 1.48 G/4L; G/4L; G/4L; G/4L
POWDER, FOR SOLUTION ORAL
Qty: 4000 ML | Refills: 0 | Status: SHIPPED | OUTPATIENT
Start: 2021-11-22

## 2021-11-22 NOTE — TELEPHONE ENCOUNTER
Scheduling:  cln w/ mac w/ Dr. Shaw Nurse  Dx:ta with carcinoma in situ  trilyte split dose sent e-scribe  -Buy over the counter dulcolax laxative, and take one tablet daily for 3 days prior to drinking the bowel prep.        Hold verapamil day of procedure  Hold metformin day before and day of procedure

## 2021-11-23 ENCOUNTER — TELEPHONE (OUTPATIENT)
Dept: OBGYN CLINIC | Facility: CLINIC | Age: 66
End: 2021-11-23

## 2021-11-23 RX ORDER — MISOPROSTOL 200 UG/1
TABLET ORAL
Qty: 2 TABLET | Refills: 0 | Status: SHIPPED | OUTPATIENT
Start: 2021-11-23

## 2021-11-23 RX ORDER — DOXYCYCLINE HYCLATE 100 MG
100 TABLET ORAL 2 TIMES DAILY
Qty: 20 TABLET | Refills: 0 | Status: SHIPPED | OUTPATIENT
Start: 2021-11-23 | End: 2021-12-03

## 2021-11-23 NOTE — TELEPHONE ENCOUNTER
The patient had a biopsy and she had an infection and she wants to know what the treatment is going to be. She says that she has not heard anything in 3 months about it.

## 2021-11-23 NOTE — TELEPHONE ENCOUNTER
Called and dw pt endometrial biopsy - chronic endometritis. RX sent in for doxycycline 100 bid x 10 days. DW pt usn showed endometrial stripe = 9 mm. Recommend hysteroscopy.  I discussed with the patient  the procedure including the preop/procedure/post

## 2021-11-23 NOTE — TELEPHONE ENCOUNTER
Me     AD    11/19/21 10:51 AM  Note  Called pt and informed results are release before LC has had an opportunity to review. Will route to [facility], pt verbalized understanding.

## 2021-11-24 ENCOUNTER — TELEPHONE (OUTPATIENT)
Dept: OBGYN CLINIC | Facility: CLINIC | Age: 66
End: 2021-11-24

## 2021-12-18 ENCOUNTER — LAB ENCOUNTER (OUTPATIENT)
Dept: LAB | Facility: HOSPITAL | Age: 66
End: 2021-12-18
Attending: OBSTETRICS & GYNECOLOGY
Payer: COMMERCIAL

## 2021-12-18 DIAGNOSIS — Z01.818 PREOP TESTING: ICD-10-CM

## 2021-12-20 ENCOUNTER — ANESTHESIA EVENT (OUTPATIENT)
Dept: SURGERY | Facility: HOSPITAL | Age: 66
End: 2021-12-20
Payer: COMMERCIAL

## 2021-12-20 ENCOUNTER — HOSPITAL ENCOUNTER (OUTPATIENT)
Facility: HOSPITAL | Age: 66
Setting detail: HOSPITAL OUTPATIENT SURGERY
Discharge: HOME | End: 2021-12-20
Attending: OBSTETRICS & GYNECOLOGY | Admitting: OBSTETRICS & GYNECOLOGY
Payer: COMMERCIAL

## 2021-12-20 ENCOUNTER — ANESTHESIA (OUTPATIENT)
Dept: SURGERY | Facility: HOSPITAL | Age: 66
End: 2021-12-20
Payer: COMMERCIAL

## 2021-12-20 VITALS
RESPIRATION RATE: 18 BRPM | SYSTOLIC BLOOD PRESSURE: 121 MMHG | WEIGHT: 173 LBS | OXYGEN SATURATION: 100 % | BODY MASS INDEX: 25.62 KG/M2 | HEART RATE: 71 BPM | DIASTOLIC BLOOD PRESSURE: 70 MMHG | HEIGHT: 69 IN | TEMPERATURE: 98 F

## 2021-12-20 DIAGNOSIS — Z01.818 PREOP TESTING: Primary | ICD-10-CM

## 2021-12-20 PROCEDURE — 58558 HYSTEROSCOPY BIOPSY: CPT | Performed by: OBSTETRICS & GYNECOLOGY

## 2021-12-20 PROCEDURE — 0UDB8ZX EXTRACTION OF ENDOMETRIUM, VIA NATURAL OR ARTIFICIAL OPENING ENDOSCOPIC, DIAGNOSTIC: ICD-10-PCS | Performed by: OBSTETRICS & GYNECOLOGY

## 2021-12-20 RX ORDER — HYDROMORPHONE HYDROCHLORIDE 1 MG/ML
0.6 INJECTION, SOLUTION INTRAMUSCULAR; INTRAVENOUS; SUBCUTANEOUS EVERY 5 MIN PRN
Status: DISCONTINUED | OUTPATIENT
Start: 2021-12-20 | End: 2021-12-20

## 2021-12-20 RX ORDER — HALOPERIDOL 5 MG/ML
0.25 INJECTION INTRAMUSCULAR ONCE AS NEEDED
Status: DISCONTINUED | OUTPATIENT
Start: 2021-12-20 | End: 2021-12-20

## 2021-12-20 RX ORDER — ONDANSETRON 2 MG/ML
4 INJECTION INTRAMUSCULAR; INTRAVENOUS EVERY 8 HOURS PRN
Status: CANCELLED | OUTPATIENT
Start: 2021-12-20

## 2021-12-20 RX ORDER — PROCHLORPERAZINE EDISYLATE 5 MG/ML
5 INJECTION INTRAMUSCULAR; INTRAVENOUS ONCE AS NEEDED
Status: DISCONTINUED | OUTPATIENT
Start: 2021-12-20 | End: 2021-12-20

## 2021-12-20 RX ORDER — METOPROLOL TARTRATE 5 MG/5ML
2.5 INJECTION INTRAVENOUS ONCE
Status: DISCONTINUED | OUTPATIENT
Start: 2021-12-20 | End: 2021-12-20

## 2021-12-20 RX ORDER — LIDOCAINE HYDROCHLORIDE 10 MG/ML
INJECTION, SOLUTION EPIDURAL; INFILTRATION; INTRACAUDAL; PERINEURAL AS NEEDED
Status: DISCONTINUED | OUTPATIENT
Start: 2021-12-20 | End: 2021-12-20 | Stop reason: SURG

## 2021-12-20 RX ORDER — MORPHINE SULFATE 4 MG/ML
2 INJECTION, SOLUTION INTRAMUSCULAR; INTRAVENOUS EVERY 10 MIN PRN
Status: DISCONTINUED | OUTPATIENT
Start: 2021-12-20 | End: 2021-12-20

## 2021-12-20 RX ORDER — HYDROCODONE BITARTRATE AND ACETAMINOPHEN 5; 325 MG/1; MG/1
2 TABLET ORAL AS NEEDED
Status: DISCONTINUED | OUTPATIENT
Start: 2021-12-20 | End: 2021-12-20

## 2021-12-20 RX ORDER — HYDROMORPHONE HYDROCHLORIDE 1 MG/ML
0.4 INJECTION, SOLUTION INTRAMUSCULAR; INTRAVENOUS; SUBCUTANEOUS EVERY 5 MIN PRN
Status: DISCONTINUED | OUTPATIENT
Start: 2021-12-20 | End: 2021-12-20

## 2021-12-20 RX ORDER — IBUPROFEN 600 MG/1
600 TABLET ORAL ONCE
Status: COMPLETED | OUTPATIENT
Start: 2021-12-20 | End: 2021-12-20

## 2021-12-20 RX ORDER — SODIUM CHLORIDE, SODIUM LACTATE, POTASSIUM CHLORIDE, CALCIUM CHLORIDE 600; 310; 30; 20 MG/100ML; MG/100ML; MG/100ML; MG/100ML
INJECTION, SOLUTION INTRAVENOUS CONTINUOUS
Status: DISCONTINUED | OUTPATIENT
Start: 2021-12-20 | End: 2021-12-20

## 2021-12-20 RX ORDER — ACETAMINOPHEN 500 MG
1000 TABLET ORAL ONCE
Status: COMPLETED | OUTPATIENT
Start: 2021-12-20 | End: 2021-12-20

## 2021-12-20 RX ORDER — HYDROMORPHONE HYDROCHLORIDE 1 MG/ML
0.2 INJECTION, SOLUTION INTRAMUSCULAR; INTRAVENOUS; SUBCUTANEOUS EVERY 5 MIN PRN
Status: DISCONTINUED | OUTPATIENT
Start: 2021-12-20 | End: 2021-12-20

## 2021-12-20 RX ORDER — DEXAMETHASONE SODIUM PHOSPHATE 4 MG/ML
VIAL (ML) INJECTION AS NEEDED
Status: DISCONTINUED | OUTPATIENT
Start: 2021-12-20 | End: 2021-12-20 | Stop reason: SURG

## 2021-12-20 RX ORDER — HYDROCODONE BITARTRATE AND ACETAMINOPHEN 5; 325 MG/1; MG/1
1 TABLET ORAL AS NEEDED
Status: DISCONTINUED | OUTPATIENT
Start: 2021-12-20 | End: 2021-12-20

## 2021-12-20 RX ORDER — NALOXONE HYDROCHLORIDE 0.4 MG/ML
80 INJECTION, SOLUTION INTRAMUSCULAR; INTRAVENOUS; SUBCUTANEOUS AS NEEDED
Status: DISCONTINUED | OUTPATIENT
Start: 2021-12-20 | End: 2021-12-20

## 2021-12-20 RX ORDER — DEXTROSE MONOHYDRATE 25 G/50ML
50 INJECTION, SOLUTION INTRAVENOUS
Status: DISCONTINUED | OUTPATIENT
Start: 2021-12-20 | End: 2021-12-20

## 2021-12-20 RX ORDER — ONDANSETRON 4 MG/1
4 TABLET, FILM COATED ORAL EVERY 8 HOURS PRN
Status: CANCELLED | OUTPATIENT
Start: 2021-12-20

## 2021-12-20 RX ORDER — MORPHINE SULFATE 10 MG/ML
6 INJECTION, SOLUTION INTRAMUSCULAR; INTRAVENOUS EVERY 10 MIN PRN
Status: DISCONTINUED | OUTPATIENT
Start: 2021-12-20 | End: 2021-12-20

## 2021-12-20 RX ORDER — ONDANSETRON 2 MG/ML
4 INJECTION INTRAMUSCULAR; INTRAVENOUS ONCE AS NEEDED
Status: DISCONTINUED | OUTPATIENT
Start: 2021-12-20 | End: 2021-12-20

## 2021-12-20 RX ORDER — MORPHINE SULFATE 4 MG/ML
4 INJECTION, SOLUTION INTRAMUSCULAR; INTRAVENOUS EVERY 10 MIN PRN
Status: DISCONTINUED | OUTPATIENT
Start: 2021-12-20 | End: 2021-12-20

## 2021-12-20 RX ORDER — GLYCOPYRROLATE 0.2 MG/ML
INJECTION, SOLUTION INTRAMUSCULAR; INTRAVENOUS AS NEEDED
Status: DISCONTINUED | OUTPATIENT
Start: 2021-12-20 | End: 2021-12-20 | Stop reason: SURG

## 2021-12-20 RX ORDER — ONDANSETRON 2 MG/ML
INJECTION INTRAMUSCULAR; INTRAVENOUS AS NEEDED
Status: DISCONTINUED | OUTPATIENT
Start: 2021-12-20 | End: 2021-12-20 | Stop reason: SURG

## 2021-12-20 RX ADMIN — DEXAMETHASONE SODIUM PHOSPHATE 4 MG: 4 MG/ML VIAL (ML) INJECTION at 07:35:00

## 2021-12-20 RX ADMIN — LIDOCAINE HYDROCHLORIDE 20 MG: 10 INJECTION, SOLUTION EPIDURAL; INFILTRATION; INTRACAUDAL; PERINEURAL at 07:35:00

## 2021-12-20 RX ADMIN — SODIUM CHLORIDE, SODIUM LACTATE, POTASSIUM CHLORIDE, CALCIUM CHLORIDE: 600; 310; 30; 20 INJECTION, SOLUTION INTRAVENOUS at 08:11:00

## 2021-12-20 RX ADMIN — SODIUM CHLORIDE, SODIUM LACTATE, POTASSIUM CHLORIDE, CALCIUM CHLORIDE: 600; 310; 30; 20 INJECTION, SOLUTION INTRAVENOUS at 07:29:00

## 2021-12-20 RX ADMIN — ONDANSETRON 4 MG: 2 INJECTION INTRAMUSCULAR; INTRAVENOUS at 07:35:00

## 2021-12-20 RX ADMIN — LIDOCAINE HYDROCHLORIDE 30 MG: 10 INJECTION, SOLUTION EPIDURAL; INFILTRATION; INTRACAUDAL; PERINEURAL at 07:34:00

## 2021-12-20 RX ADMIN — GLYCOPYRROLATE 0.2 MG: 0.2 INJECTION, SOLUTION INTRAMUSCULAR; INTRAVENOUS at 07:35:00

## 2021-12-20 NOTE — BRIEF OP NOTE
Pre-Operative Diagnosis: postmenopausal bleeding     Post-Operative Diagnosis: postmenopausal bleeding      Procedure Performed:   hysteroscopy, Uterine sampling    Surgeon(s) and Role:     * Юлия Trammell MD - Primary    Assistant(s):

## 2021-12-20 NOTE — ANESTHESIA PROCEDURE NOTES
Airway  Date/Time: 12/20/2021 7:37 AM  Urgency: Elective    Airway not difficult    General Information and Staff    Patient location during procedure: OR  Anesthesiologist: Deneen Mendes MD  Resident/CRNA: Maida Bridges CRNA  Performed: CRNA     Indications

## 2021-12-20 NOTE — H&P
GYN H&P       CC: Patient is here for PMB/hysteroscopy    · HPI: Patient is a 78year old  for 1 M h/o vaginal spotting, never heavy, no pain. No hormones or steroids. Had USN which showed lining was thickened.  She had an endometrial ablation age 38 liver, and uterer, colon   • Endometrial Cancer Mother         treated with radiation and surgery   • Colon Cancer Mother    • Colon Cancer Brother    • No Known Problems Maternal Grandmother         lived to age 80   • Cancer Maternal Grandfather 66 and difficult to pass catheter.  Uterus sounded to 5 CM    Narrative   PROCEDURE:  US PELVIS W EV (LCG=[id number])       COMPARISON:  None.       INDICATIONS:  N93.9 Vaginal spotting       TECHNIQUE:  Pelvic ultrasound using transabdominal and endovaginal

## 2021-12-20 NOTE — ANESTHESIA PREPROCEDURE EVALUATION
Anesthesia PreOp Note    HPI:     Nancie Tate is a 78year old female who presents for preoperative consultation requested by: Shae Grove MD    Date of Surgery: 12/20/2021    Procedure(s):   HYSTEROSCOPY  Indication: postmenopausal bleed ENDOMETRIAL ABLATION  2009   • TONSILLECTOMY     • TOTAL HIP REPLACEMENT Left 4/17/17    Dr. Vega    • TOTAL HIP REPLACEMENT Right 06/21/2017    Dr. Vega       Probiotic Product (PROBIOTIC DAILY OR), Take by mouth once daily. , Disp: , Rfl: , 12/16/2021 12/20/21 0655  metoprolol tartrate (LOPRESSOR) tab 25 mg, 25 mg, Oral, Once PRN, Aisha Trammell MD    No current [facility]-ordered outpatient medications on file.         Amoxicillin             FEVER  Sulfa Antibiotics       OTHER (SEE COMMENTS) Partner Violence: Not on file  Housing Stability: Not on file    Available pre-op labs reviewed.      Lab Results   Component Value Date     09/24/2021    K 4.1 09/24/2021     09/24/2021    CO2 27.0 09/24/2021    BUN 16 09/24/2021    CREATSERUM

## 2021-12-20 NOTE — INTERVAL H&P NOTE
Pre-op Diagnosis: postmenopausal bleeding    The above referenced H&P was reviewed by Jaz Reyes MD on 12/20/2021, the patient was examined and no significant changes have occurred in the patient's condition since the H&P was performed.   I

## 2021-12-20 NOTE — ANESTHESIA POSTPROCEDURE EVALUATION
Patient: Kiana Mora    Procedure Summary     Date: 12/20/21 Room / Location: [facility] OR  / [facility] OR    Anesthesia Start: 0157 Anesthesia Stop: 9797    Procedure: hysteroscopy, Uterine sampling (N/A Vagina ) Diagnosis: (postmenopausal bleeding)

## 2021-12-23 ENCOUNTER — OFFICE VISIT (OUTPATIENT)
Dept: AUDIOLOGY | Facility: CLINIC | Age: 66
End: 2021-12-23
Payer: COMMERCIAL

## 2021-12-23 DIAGNOSIS — H90.3 SENSORINEURAL HEARING LOSS, BILATERAL: Primary | ICD-10-CM

## 2021-12-23 PROCEDURE — 92557 COMPREHENSIVE HEARING TEST: CPT | Performed by: AUDIOLOGIST

## 2021-12-23 PROCEDURE — 92567 TYMPANOMETRY: CPT | Performed by: AUDIOLOGIST

## 2022-01-01 NOTE — ADDENDUM NOTE
Addended by: Candy Daly on: 12/23/2021 01:43 PM     Modules accepted: Orders, SmartSet
Attending to bill

## 2022-01-05 ENCOUNTER — E-VISIT (OUTPATIENT)
Dept: TELEHEALTH | Age: 67
End: 2022-01-05

## 2022-01-05 DIAGNOSIS — Z20.822 ENCOUNTER BY TELEHEALTH FOR SUSPECTED COVID-19: Primary | ICD-10-CM

## 2022-01-05 PROCEDURE — 99421 OL DIG E/M SVC 5-10 MIN: CPT | Performed by: NURSE PRACTITIONER

## 2022-01-06 ENCOUNTER — LAB ENCOUNTER (OUTPATIENT)
Dept: LAB | Age: 67
End: 2022-01-06
Attending: NURSE PRACTITIONER
Payer: COMMERCIAL

## 2022-01-06 DIAGNOSIS — Z20.822 ENCOUNTER BY TELEHEALTH FOR SUSPECTED COVID-19: ICD-10-CM

## 2022-01-06 NOTE — PROGRESS NOTES
Patient requested an E-Visit. After reviewing history, symptoms and ordering laboratory studies, (7) minutes of time was accrued.   Please see E-Visit for further information

## 2022-01-07 LAB — SARS-COV-2 RNA RESP QL NAA+PROBE: NOT DETECTED

## 2022-01-12 ENCOUNTER — PATIENT MESSAGE (OUTPATIENT)
Dept: AUDIOLOGY | Facility: CLINIC | Age: 67
End: 2022-01-12

## 2022-01-17 NOTE — TELEPHONE ENCOUNTER
From: Renée Resendez  To: Cari Chavarria  Sent: 1/12/2022 11:55 AM CST  Subject: Hearing Aid Benefits    Jazmine Monteiro,     I have contacted [facility] to review your hearing aid benefits. Your plan does offer coverage.      You must first meet your $500 annual deducti

## 2022-02-01 RX ORDER — PRAVASTATIN SODIUM 40 MG
TABLET ORAL
Qty: 90 TABLET | Refills: 1 | Status: SHIPPED | OUTPATIENT
Start: 2022-02-01

## 2022-02-17 NOTE — TELEPHONE ENCOUNTER
Spoke with patient in regards to colonoscopy procedure requested patient states she has gone elsewhere and already has had her Colonoscopy     SK

## 2022-03-28 ENCOUNTER — OFFICE VISIT (OUTPATIENT)
Dept: AUDIOLOGY | Facility: CLINIC | Age: 67
End: 2022-03-28
Payer: COMMERCIAL

## 2022-03-28 DIAGNOSIS — H90.3 SENSORINEURAL HEARING LOSS, BILATERAL: Primary | ICD-10-CM

## 2022-03-28 PROCEDURE — 92591 HEARING AID EXAM, BOTH EARS: CPT | Performed by: AUDIOLOGIST

## 2022-03-28 NOTE — PROGRESS NOTES
Hearing Aid Evaluation    Saniya Shook  8/28/1955  [id number]    Saniya Shook is a first time user. The following were discussed with Jorge Suarez:    Explanation of Audiogram  Patient's hearing loss  Communication difficulties  Importance of binaural instruments  Performance of noise  Benefits/limitations of style  Adjustment period and follow up visits    Hearing Aid Features  Automatic selection  Dual microphones  Noise suppression  Compatible with FM devices    Charges Associated with Hearing Aids  Hearing aid evaluation  Hearing aid(s)  Payment in full at delivery  Batteries  Office visits after service agreement ends      Medical clearance was given by Dr. Geovanni Whelan  .     Patient's Choice  Level of technology: Basic  Fitting: binaural    Hearing Aid Ordered       Right    Left    OTICON OTICON   ZIRCON 2 MINI RITE R ZIRCON 2 MINI RITE R   COLOR: CHROMA BEIGE COLOR: CHROMA BEIGE   REC: 61 REC: 60   LENGTH: 3 LENGTH 3   DOME OPEN BASS 6MM DOME OPEN BASS 6MM                 Patient has sent an appointment for  on 5/2/2022.    3/28/2022  Ashu Elise

## 2022-04-03 NOTE — TELEPHONE ENCOUNTER
Refill passed per [place] [employer], [facility] protocol.      Requested Prescriptions   Pending Prescriptions Disp Refills    METFORMIN HCL 1000 MG Oral Tab [Pharmacy Med Name: METFORMIN 1000MG TABLETS] 180 tablet 1     Sig: TAKE 1 TABLET(1000 MG) BY MOUTH TWICE DAILY WITH MEALS        Diabetes Medication Protocol Passed - 4/3/2022  9:33 AM        Passed - Last A1C < 7.5 and within past 6 months     Lab Results   Component Value Date    A1C 6.4 (A) 10/26/2021               Passed - Appointment in past 6 or next 3 months        Passed - GFR Non- > 50     Lab Results   Component Value Date    GFRNAA 100 09/24/2021                 Passed - GFR in the past 12 months           VERAPAMIL  MG Oral Tab CR [Pharmacy Med Name: VERAPAMIL ER 120MG TABLETS] 90 tablet 1     Sig: TAKE 1 TABLET(120 MG) BY MOUTH DAILY        Hypertensive Medications Protocol Passed - 4/3/2022  9:33 AM        Passed - CMP or BMP in past 12 months        Passed - Appointment in past 6 or next 3 months        Passed - GFR Non- > 50     Lab Results   Component Value Date    GFRNAA 100 09/24/2021                         Recent Outpatient Visits              6 days ago Sensorineural hearing loss, bilateral    [facility] Ashu Morillo    Office Visit    2 months ago Encounter by telehealth for suspected [facility] Virtual Visit MCKENNA Dahl    E-Visit    3 months ago Sensorineural hearing loss, bilateral    [facility] Ashu Talley    Office Visit    3 months ago Personal history of colonic polyps    Gastroenterology - [address], Jeanette Menendez MD    Office Visit    4 months ago Postmenopausal bleeding    [address] [facility],[address], Mary Hernadez MD    Office Visit             Future Appointments         Provider Department Appt Notes    In 4 weeks Armando Carias Ashu King [facility] f

## 2022-05-02 ENCOUNTER — OFFICE VISIT (OUTPATIENT)
Dept: AUDIOLOGY | Facility: CLINIC | Age: 67
End: 2022-05-02
Payer: COMMERCIAL

## 2022-05-02 DIAGNOSIS — H90.3 SENSORINEURAL HEARING LOSS, BILATERAL: Primary | ICD-10-CM

## 2022-05-02 PROCEDURE — V5261 HEARING AID, DIGIT, BIN, BTE: HCPCS | Performed by: AUDIOLOGIST

## 2022-05-02 NOTE — PROGRESS NOTES
Hearing Aid Fitting    Suze Cotter purchased two hearing aids. The hearing aid fitting was completed by Alejandro Mcmahon Hearing Aid Information       Right    Left   Make: Oticon Make: Oticon   Model: [place] [facility] 2 MINI RITE R Model: ZIRCON 2 MINI RITE R   Serial Number: [id number] Serial Number: [id number]   Date of Purchase: 05/02/22 Date of Purchase: 05/02/22   Repair Warranty Exp: 05/02/25 Repair Warranty Exp: 05/02/25   L&D Warranty Exp: 05/02/25 L&D Warranty Exp: 05/02/25   Color:  (CHROMA BEIGE) Color:  (CHROMA BEIGE)   Battery:  (RECHARGEABLE) Battery:  (RECHARGEABLE)     The following items were demonstrated to the patient:  Insertion/removal of hearing aid into ear  Adjustment of volume  Changing programs  Function and operation of controls  Telephone use  Cleaning of hearing aid  Storage of hearing aids  Use of accessories  Remote control operation    The patient was informed of or received the following:  Adjustment period and realistic expectations  Battery size  Battery ingestion warning  Cleaning tools  Hearing aid instruction book   Carrying case  Purchase agreement  Repair/loss coverage  Trial period/return policy  Service period  Medical clearance      Real ear testing was done today  Paired hearing aids to patient's Iphone  Gave patient [employer] hotline number    Patient expressed understanding to all discussed topics. Follow-up scheduled for 3 weeks.       05/02/22  Ashu Mcmahon

## 2022-05-04 RX ORDER — FLUOXETINE HYDROCHLORIDE 40 MG/1
40 CAPSULE ORAL DAILY
Qty: 90 CAPSULE | Refills: 1 | Status: SHIPPED | OUTPATIENT
Start: 2022-05-04 | End: 2023-01-01

## 2022-05-04 NOTE — TELEPHONE ENCOUNTER
Refill passed per [place] [employer], [facility] protocol.     Requested Prescriptions   Pending Prescriptions Disp Refills    FLUOXETINE HCL 40 MG Oral Cap [Pharmacy Med Name: FLUOXETINE 40MG CAPSULES] 90 capsule 1     Sig: TAKE 1 CAPSULE(40 MG) BY MOUTH DAILY        Psychiatric Non-Scheduled (Anti-Anxiety) Passed - 5/4/2022  2:56 PM        Passed - Appointment in last 6 or next 3 months              Future Appointments         Provider Department Appt Notes    In 1 month York Leavenworth, Au.D [facility] after purchasing            Recent Outpatient Visits              2 days ago Sensorineural hearing loss, bilateral    [facility] York Leavenworth, Au.D    Office Visit    1 month ago Sensorineural hearing loss, bilateral    [facility] York Leavenworth, Au.D    Office Visit    3 months ago Encounter by telehealth for suspected COVID-19    [facility] Virtual Visit MCKENNA Cabrera    E-Visit    4 months ago Sensorineural hearing loss, bilateral    [facility] Author Ashu Perez    Office Visit    4 months ago Personal history of colonic polyps    Gastroenterology - [facility], Madison Hoyer, MD    Office Visit

## 2022-07-04 RX ORDER — LEVOTHYROXINE AND LIOTHYRONINE 76; 18 UG/1; UG/1
120 TABLET ORAL DAILY
Qty: 90 TABLET | Refills: 0 | Status: SHIPPED | OUTPATIENT
Start: 2022-07-04

## 2022-07-06 ENCOUNTER — TELEPHONE (OUTPATIENT)
Dept: FAMILY MEDICINE CLINIC | Facility: CLINIC | Age: 67
End: 2022-07-06

## 2022-07-06 NOTE — TELEPHONE ENCOUNTER
LVMTCB to schedule annual exam/follow up with [employer], as she is overdue. Pt must have an appointment scheduled prior to any future medication refills. Pt also notified via [employer] message.

## 2022-07-11 NOTE — TELEPHONE ENCOUNTER
2nd Attempt to reach patient. Lvm to call back to schedule or schedule online. Pt read previous tuul message sent on 7/6. No further attempts will be made.

## 2022-07-12 ENCOUNTER — HOSPITAL ENCOUNTER (OUTPATIENT)
Age: 67
Discharge: HOME | End: 2022-07-12
Payer: COMMERCIAL

## 2022-07-12 VITALS
DIASTOLIC BLOOD PRESSURE: 84 MMHG | RESPIRATION RATE: 18 BRPM | OXYGEN SATURATION: 98 % | HEART RATE: 92 BPM | TEMPERATURE: 98 F | SYSTOLIC BLOOD PRESSURE: 129 MMHG

## 2022-07-12 DIAGNOSIS — H10.33 ACUTE CONJUNCTIVITIS OF BOTH EYES, UNSPECIFIED ACUTE CONJUNCTIVITIS TYPE: ICD-10-CM

## 2022-07-12 DIAGNOSIS — H00.011 HORDEOLUM EXTERNUM OF RIGHT UPPER EYELID: Primary | ICD-10-CM

## 2022-07-12 PROCEDURE — 99213 OFFICE O/P EST LOW 20 MIN: CPT | Performed by: NURSE PRACTITIONER

## 2022-07-12 RX ORDER — TETRACAINE HYDROCHLORIDE 5 MG/ML
1 SOLUTION OPHTHALMIC ONCE
Status: COMPLETED | OUTPATIENT
Start: 2022-07-12 | End: 2022-07-12

## 2022-07-12 RX ORDER — ERYTHROMYCIN 5 MG/G
1 OINTMENT OPHTHALMIC EVERY 6 HOURS
Qty: 3.5 G | Refills: 0 | Status: SHIPPED | OUTPATIENT
Start: 2022-07-12 | End: 2022-07-19

## 2022-07-12 RX ORDER — PURIFIED WATER 986 MG/ML
10 SOLUTION OPHTHALMIC ONCE
Status: COMPLETED | OUTPATIENT
Start: 2022-07-12 | End: 2022-07-12

## 2022-07-12 NOTE — ED INITIAL ASSESSMENT (HPI)
Pt here with complaints of pain to her bilateral eyes.  Pt states she began to have discomfort in her left eye 1 week ago and now is having right eye pain that began 2 days ago ,pt states right eye feels \"gritty\" pt denies any visual disturbance

## 2022-08-10 NOTE — TELEPHONE ENCOUNTER
From: Zohaib Cha  To: Maru Francis MD  Sent: 11/19/2021 10:24 AM CST  Subject: Antibiotics for infection    The results of my biopsy indicate that I have chronic emdometritis. Do I need antibiotics to clear this up?     Mohamud Dale How Severe Is It?: mild Is This A New Presentation, Or A Follow-Up?: Cyst Additional History: Pt has a SK on back would like cryotherapy, has a red blotch on her right cheek would like evaluated.

## 2022-08-22 ENCOUNTER — MED REC SCAN ONLY (OUTPATIENT)
Dept: FAMILY MEDICINE CLINIC | Facility: CLINIC | Age: 67
End: 2022-08-22

## 2022-08-29 ENCOUNTER — LAB ENCOUNTER (OUTPATIENT)
Dept: LAB | Facility: REFERENCE LAB | Age: 67
End: 2022-08-29
Attending: FAMILY MEDICINE
Payer: COMMERCIAL

## 2022-08-29 DIAGNOSIS — E03.9 HYPOTHYROIDISM, UNSPECIFIED TYPE: ICD-10-CM

## 2022-08-29 DIAGNOSIS — Z00.00 ROUTINE GENERAL MEDICAL EXAMINATION AT A HEALTH CARE FACILITY: ICD-10-CM

## 2022-08-29 LAB
ALBUMIN SERPL-MCNC: 3.6 G/DL (ref 3.4–5)
ALBUMIN/GLOB SERPL: 0.9 {RATIO} (ref 1–2)
ALP LIVER SERPL-CCNC: 145 U/L
ALT SERPL-CCNC: 50 U/L
ANION GAP SERPL CALC-SCNC: 7 MMOL/L (ref 0–18)
AST SERPL-CCNC: 22 U/L (ref 15–37)
BASOPHILS # BLD AUTO: 0.05 X10(3) UL (ref 0–0.2)
BASOPHILS NFR BLD AUTO: 0.5 %
BILIRUB SERPL-MCNC: 0.4 MG/DL (ref 0.1–2)
BUN BLD-MCNC: 24 MG/DL (ref 7–18)
BUN/CREAT SERPL: 33.3 (ref 10–20)
CALCIUM BLD-MCNC: 9.5 MG/DL (ref 8.5–10.1)
CHLORIDE SERPL-SCNC: 103 MMOL/L (ref 98–112)
CHOLEST SERPL-MCNC: 263 MG/DL (ref ?–200)
CO2 SERPL-SCNC: 26 MMOL/L (ref 21–32)
CREAT BLD-MCNC: 0.72 MG/DL
DEPRECATED RDW RBC AUTO: 46.8 FL (ref 35.1–46.3)
EOSINOPHIL # BLD AUTO: 0.31 X10(3) UL (ref 0–0.7)
EOSINOPHIL NFR BLD AUTO: 3.1 %
ERYTHROCYTE [DISTWIDTH] IN BLOOD BY AUTOMATED COUNT: 13.7 % (ref 11–15)
EST. AVERAGE GLUCOSE BLD GHB EST-MCNC: 154 MG/DL (ref 68–126)
FASTING PATIENT LIPID ANSWER: YES
FASTING STATUS PATIENT QL REPORTED: YES
GFR SERPLBLD BASED ON 1.73 SQ M-ARVRAT: 91 ML/MIN/1.73M2 (ref 60–?)
GLOBULIN PLAS-MCNC: 3.9 G/DL (ref 2.8–4.4)
GLUCOSE BLD-MCNC: 169 MG/DL (ref 70–99)
HBA1C MFR BLD: 7 % (ref ?–5.7)
HCT VFR BLD AUTO: 43.5 %
HDLC SERPL-MCNC: 48 MG/DL (ref 40–59)
HGB BLD-MCNC: 14 G/DL
IMM GRANULOCYTES # BLD AUTO: 0.03 X10(3) UL (ref 0–1)
IMM GRANULOCYTES NFR BLD: 0.3 %
LDLC SERPL CALC-MCNC: 181 MG/DL (ref ?–100)
LYMPHOCYTES # BLD AUTO: 2.5 X10(3) UL (ref 1–4)
LYMPHOCYTES NFR BLD AUTO: 24.8 %
MCH RBC QN AUTO: 29.6 PG (ref 26–34)
MCHC RBC AUTO-ENTMCNC: 32.2 G/DL (ref 31–37)
MCV RBC AUTO: 92 FL
MONOCYTES # BLD AUTO: 0.73 X10(3) UL (ref 0.1–1)
MONOCYTES NFR BLD AUTO: 7.2 %
NEUTROPHILS # BLD AUTO: 6.46 X10 (3) UL (ref 1.5–7.7)
NEUTROPHILS # BLD AUTO: 6.46 X10(3) UL (ref 1.5–7.7)
NEUTROPHILS NFR BLD AUTO: 64.1 %
NONHDLC SERPL-MCNC: 215 MG/DL (ref ?–130)
OSMOLALITY SERPL CALC.SUM OF ELEC: 290 MOSM/KG (ref 275–295)
PLATELET # BLD AUTO: 319 10(3)UL (ref 150–450)
POTASSIUM SERPL-SCNC: 4.7 MMOL/L (ref 3.5–5.1)
PROT SERPL-MCNC: 7.5 G/DL (ref 6.4–8.2)
RBC # BLD AUTO: 4.73 X10(6)UL
SODIUM SERPL-SCNC: 136 MMOL/L (ref 136–145)
T3FREE SERPL-MCNC: 3.37 PG/ML (ref 2.4–4.2)
T4 FREE SERPL-MCNC: 1 NG/DL (ref 0.8–1.7)
TRIGL SERPL-MCNC: 181 MG/DL (ref 30–149)
TSI SER-ACNC: 0.06 MIU/ML (ref 0.36–3.74)
VLDLC SERPL CALC-MCNC: 37 MG/DL (ref 0–30)
WBC # BLD AUTO: 10.1 X10(3) UL (ref 4–11)

## 2022-08-29 PROCEDURE — 84443 ASSAY THYROID STIM HORMONE: CPT

## 2022-08-29 PROCEDURE — 3051F HG A1C>EQUAL 7.0%<8.0%: CPT | Performed by: FAMILY MEDICINE

## 2022-08-29 PROCEDURE — 83036 HEMOGLOBIN GLYCOSYLATED A1C: CPT

## 2022-08-29 PROCEDURE — 85025 COMPLETE CBC W/AUTO DIFF WBC: CPT

## 2022-08-29 PROCEDURE — 84439 ASSAY OF FREE THYROXINE: CPT

## 2022-08-29 PROCEDURE — 80053 COMPREHEN METABOLIC PANEL: CPT

## 2022-08-29 PROCEDURE — 36415 COLL VENOUS BLD VENIPUNCTURE: CPT

## 2022-08-29 PROCEDURE — 84481 FREE ASSAY (FT-3): CPT

## 2022-08-29 PROCEDURE — 80061 LIPID PANEL: CPT

## 2022-09-14 ENCOUNTER — OFFICE VISIT (OUTPATIENT)
Dept: FAMILY MEDICINE CLINIC | Facility: CLINIC | Age: 67
End: 2022-09-14
Payer: COMMERCIAL

## 2022-09-14 VITALS
DIASTOLIC BLOOD PRESSURE: 80 MMHG | BODY MASS INDEX: 25.48 KG/M2 | HEART RATE: 84 BPM | WEIGHT: 172 LBS | HEIGHT: 69 IN | OXYGEN SATURATION: 98 % | SYSTOLIC BLOOD PRESSURE: 126 MMHG

## 2022-09-14 DIAGNOSIS — E03.9 HYPOTHYROIDISM, UNSPECIFIED TYPE: ICD-10-CM

## 2022-09-14 DIAGNOSIS — E11.29 CONTROLLED TYPE 2 DIABETES MELLITUS WITH MICROALBUMINURIA, WITHOUT LONG-TERM CURRENT USE OF INSULIN (HCC): ICD-10-CM

## 2022-09-14 DIAGNOSIS — I10 HYPERTENSION WITH GOAL BLOOD PRESSURE LESS THAN 140/90: ICD-10-CM

## 2022-09-14 DIAGNOSIS — Z00.00 ENCOUNTER FOR ROUTINE ADULT HEALTH EXAMINATION WITHOUT ABNORMAL FINDINGS: Primary | ICD-10-CM

## 2022-09-14 DIAGNOSIS — R80.9 CONTROLLED TYPE 2 DIABETES MELLITUS WITH MICROALBUMINURIA, WITHOUT LONG-TERM CURRENT USE OF INSULIN (HCC): ICD-10-CM

## 2022-09-14 DIAGNOSIS — L90.0 LICHEN SCLEROSUS: ICD-10-CM

## 2022-09-14 DIAGNOSIS — Z00.00 ROUTINE GENERAL MEDICAL EXAMINATION AT A HEALTH CARE FACILITY: ICD-10-CM

## 2022-09-14 DIAGNOSIS — E78.2 MIXED HYPERLIPIDEMIA: ICD-10-CM

## 2022-09-14 LAB
CREAT UR-SCNC: 92.8 MG/DL
MICROALBUMIN UR-MCNC: 3.68 MG/DL
MICROALBUMIN/CREAT 24H UR-RTO: 39.7 UG/MG (ref ?–30)

## 2022-09-14 PROCEDURE — 82043 UR ALBUMIN QUANTITATIVE: CPT | Performed by: FAMILY MEDICINE

## 2022-09-14 PROCEDURE — 3008F BODY MASS INDEX DOCD: CPT | Performed by: FAMILY MEDICINE

## 2022-09-14 PROCEDURE — 99214 OFFICE O/P EST MOD 30 MIN: CPT | Performed by: FAMILY MEDICINE

## 2022-09-14 PROCEDURE — 82570 ASSAY OF URINE CREATININE: CPT | Performed by: FAMILY MEDICINE

## 2022-09-14 PROCEDURE — 3074F SYST BP LT 130 MM HG: CPT | Performed by: FAMILY MEDICINE

## 2022-09-14 PROCEDURE — 3079F DIAST BP 80-89 MM HG: CPT | Performed by: FAMILY MEDICINE

## 2022-09-14 PROCEDURE — 99397 PER PM REEVAL EST PAT 65+ YR: CPT | Performed by: FAMILY MEDICINE

## 2022-09-14 RX ORDER — CLOBETASOL PROPIONATE 0.5 MG/G
OINTMENT TOPICAL
Qty: 45 G | Refills: 0 | Status: SHIPPED | OUTPATIENT
Start: 2022-09-14

## 2022-09-14 RX ORDER — PRAVASTATIN SODIUM 20 MG
20 TABLET ORAL NIGHTLY
Qty: 90 TABLET | Refills: 1 | Status: SHIPPED | OUTPATIENT
Start: 2022-09-14

## 2022-11-30 RX ORDER — LEVOTHYROXINE, LIOTHYRONINE 9.5; 2.25 UG/1; UG/1
TABLET ORAL
Qty: 90 TABLET | Refills: 0 | Status: SHIPPED | OUTPATIENT
Start: 2022-11-30

## 2022-11-30 RX ORDER — LEVOTHYROXINE AND LIOTHYRONINE 57; 13.5 UG/1; UG/1
TABLET ORAL
Qty: 90 TABLET | Refills: 0 | OUTPATIENT
Start: 2022-11-30

## 2022-11-30 RX ORDER — LEVOTHYROXINE AND LIOTHYRONINE 57; 13.5 UG/1; UG/1
TABLET ORAL
Qty: 90 TABLET | Refills: 0 | Status: SHIPPED | OUTPATIENT
Start: 2022-11-30

## 2023-03-01 RX ORDER — LEVOTHYROXINE AND LIOTHYRONINE 57; 13.5 UG/1; UG/1
TABLET ORAL
Qty: 90 TABLET | Refills: 0 | Status: SHIPPED | OUTPATIENT
Start: 2023-03-01 | End: 2023-03-01

## 2023-03-01 RX ORDER — LEVOTHYROXINE, LIOTHYRONINE 9.5; 2.25 UG/1; UG/1
TABLET ORAL
Qty: 90 TABLET | Refills: 0 | Status: SHIPPED | OUTPATIENT
Start: 2023-03-01 | End: 2023-03-01

## 2023-03-01 RX ORDER — LEVOTHYROXINE AND LIOTHYRONINE 9.5; 2.25 UG/1; UG/1
15 TABLET ORAL DAILY
Qty: 60 TABLET | Refills: 0 | Status: SHIPPED | OUTPATIENT
Start: 2023-03-01

## 2023-03-01 NOTE — TELEPHONE ENCOUNTER
Needs to come in for repeat labs, especially thyroid levels, to ensure dose is adequate. Only sending temporary supply until labs completed.

## 2023-03-01 NOTE — TELEPHONE ENCOUNTER
Protocol failed or has No Protocol, please review    Medication pended for your review / approval    Requested Prescriptions   Pending Prescriptions Disp Refills    NP THYROID 15 MG Oral Tab [Pharmacy Med Name: THYROID NP 0.25GR (15MG) TABLETS] 90 tablet 0     Sig: TAKE 1 TABLET BY MOUTH DAILY       Thyroid Medication Protocol Failed - 3/1/2023 11:31 AM        Failed - Last TSH value is normal     Lab Results   Component Value Date    TSH 0.055 (L) 08/29/2022                 Passed - TSH in past 12 months        Passed - In person appointment or virtual visit in the past 12 mos or appointment in next 3 mos     Recent Outpatient Visits              5 months ago Encounter for routine adult health examination without abnormal findings    Edda Gonzalez, [address], [place]    Office Visit    10 months ago Sensorineural hearing loss, bilateral    [facility], [address], [place], Isabela Face, Au.D    Office Visit    11 months ago Sensorineural hearing loss, bilateral    [facility]da Gonzalez, [place], Isabela Face, Au.D    Office Visit    1 year ago Encounter by telehealth for suspected COVID-19    [facility], Virtual Visit MCKENNA Chapa    E-Visit    1 year ago Sensorineural hearing loss, bilateral    [facility], [address], Jeanette Cain, Au.D    Office Visit                           Recent Outpatient Visits              5 months ago Encounter for routine adult health examination without abnormal findings    Rosio Preciado [address] [place] [address]    Office Visit    10 months ago Sensorineural hearing loss, bilateral    [facility], [address], [place], Isabela Face, Au.D    Office Visit    11 months ago Sensorineural hearing loss, bilateral    [facility] Gonzalez, [place], Isabela Face, Au.D    Office Visit    1 year ago Encounter by telehealth for suspected COVID-19    [facility], Virtual Visit MCKENNA Chapa    E-Visit    1 year ago Sensorineural hearing loss, bilateral    [facility] Jeanette Hamilton Au.D    Office Visit

## 2023-04-25 ENCOUNTER — LAB ENCOUNTER (OUTPATIENT)
Dept: LAB | Age: 68
End: 2023-04-25
Attending: INTERNAL MEDICINE
Payer: COMMERCIAL

## 2023-04-25 ENCOUNTER — OFFICE VISIT (OUTPATIENT)
Dept: RHEUMATOLOGY | Facility: CLINIC | Age: 68
End: 2023-04-25

## 2023-04-25 VITALS
DIASTOLIC BLOOD PRESSURE: 84 MMHG | HEART RATE: 87 BPM | WEIGHT: 184 LBS | HEIGHT: 69 IN | BODY MASS INDEX: 27.25 KG/M2 | SYSTOLIC BLOOD PRESSURE: 146 MMHG

## 2023-04-25 DIAGNOSIS — R68.2 DRY MOUTH: ICD-10-CM

## 2023-04-25 DIAGNOSIS — M79.18 DIFFUSE MYOFASCIAL PAIN SYNDROME: ICD-10-CM

## 2023-04-25 DIAGNOSIS — R53.82 CHRONIC FATIGUE: ICD-10-CM

## 2023-04-25 DIAGNOSIS — M15.9 PRIMARY OSTEOARTHRITIS INVOLVING MULTIPLE JOINTS: ICD-10-CM

## 2023-04-25 DIAGNOSIS — H04.123 BILATERAL DRY EYES: Primary | ICD-10-CM

## 2023-04-25 DIAGNOSIS — M25.50 ARTHRALGIA, UNSPECIFIED JOINT: ICD-10-CM

## 2023-04-25 DIAGNOSIS — H04.123 BILATERAL DRY EYES: ICD-10-CM

## 2023-04-25 LAB
ALBUMIN SERPL-MCNC: 3.8 G/DL (ref 3.4–5)
ALBUMIN/GLOB SERPL: 0.9 {RATIO} (ref 1–2)
ALP LIVER SERPL-CCNC: 124 U/L
ALT SERPL-CCNC: 96 U/L
ANION GAP SERPL CALC-SCNC: 9 MMOL/L (ref 0–18)
AST SERPL-CCNC: 57 U/L (ref 15–37)
BILIRUB SERPL-MCNC: 0.4 MG/DL (ref 0.1–2)
BUN BLD-MCNC: 21 MG/DL (ref 7–18)
BUN/CREAT SERPL: 31.3 (ref 10–20)
C3 SERPL-MCNC: 166 MG/DL (ref 90–180)
C4 SERPL-MCNC: 32.7 MG/DL (ref 10–40)
CALCIUM BLD-MCNC: 9.6 MG/DL (ref 8.5–10.1)
CHLORIDE SERPL-SCNC: 103 MMOL/L (ref 98–112)
CO2 SERPL-SCNC: 26 MMOL/L (ref 21–32)
CREAT BLD-MCNC: 0.67 MG/DL
CRP SERPL-MCNC: 0.58 MG/DL (ref ?–0.3)
DEPRECATED RDW RBC AUTO: 46.5 FL (ref 35.1–46.3)
ERYTHROCYTE [DISTWIDTH] IN BLOOD BY AUTOMATED COUNT: 13.7 % (ref 11–15)
ERYTHROCYTE [SEDIMENTATION RATE] IN BLOOD: 25 MM/HR
FASTING STATUS PATIENT QL REPORTED: NO
GFR SERPLBLD BASED ON 1.73 SQ M-ARVRAT: 95 ML/MIN/1.73M2 (ref 60–?)
GLOBULIN PLAS-MCNC: 4.1 G/DL (ref 2.8–4.4)
GLUCOSE BLD-MCNC: 218 MG/DL (ref 70–99)
HCT VFR BLD AUTO: 45.9 %
HGB BLD-MCNC: 14.4 G/DL
MCH RBC QN AUTO: 29 PG (ref 26–34)
MCHC RBC AUTO-ENTMCNC: 31.4 G/DL (ref 31–37)
MCV RBC AUTO: 92.4 FL
OSMOLALITY SERPL CALC.SUM OF ELEC: 296 MOSM/KG (ref 275–295)
PLATELET # BLD AUTO: 291 10(3)UL (ref 150–450)
POTASSIUM SERPL-SCNC: 4.5 MMOL/L (ref 3.5–5.1)
PROT SERPL-MCNC: 7.9 G/DL (ref 6.4–8.2)
RBC # BLD AUTO: 4.97 X10(6)UL
RHEUMATOID FACT SERPL-ACNC: <10 IU/ML (ref ?–15)
SODIUM SERPL-SCNC: 138 MMOL/L (ref 136–145)
T3FREE SERPL-MCNC: 2.41 PG/ML (ref 2.4–4.2)
T4 FREE SERPL-MCNC: 0.8 NG/DL (ref 0.8–1.7)
TSI SER-ACNC: 1.83 MIU/ML (ref 0.36–3.74)
WBC # BLD AUTO: 8.3 X10(3) UL (ref 4–11)

## 2023-04-25 PROCEDURE — 86235 NUCLEAR ANTIGEN ANTIBODY: CPT

## 2023-04-25 PROCEDURE — 84439 ASSAY OF FREE THYROXINE: CPT

## 2023-04-25 PROCEDURE — 86160 COMPLEMENT ANTIGEN: CPT

## 2023-04-25 PROCEDURE — 84165 PROTEIN E-PHORESIS SERUM: CPT

## 2023-04-25 PROCEDURE — 86038 ANTINUCLEAR ANTIBODIES: CPT

## 2023-04-25 PROCEDURE — 3008F BODY MASS INDEX DOCD: CPT | Performed by: INTERNAL MEDICINE

## 2023-04-25 PROCEDURE — 84481 FREE ASSAY (FT-3): CPT

## 2023-04-25 PROCEDURE — 99244 OFF/OP CNSLTJ NEW/EST MOD 40: CPT | Performed by: INTERNAL MEDICINE

## 2023-04-25 PROCEDURE — 80053 COMPREHEN METABOLIC PANEL: CPT

## 2023-04-25 PROCEDURE — 36415 COLL VENOUS BLD VENIPUNCTURE: CPT

## 2023-04-25 PROCEDURE — 86225 DNA ANTIBODY NATIVE: CPT

## 2023-04-25 PROCEDURE — 85027 COMPLETE CBC AUTOMATED: CPT

## 2023-04-25 PROCEDURE — 3079F DIAST BP 80-89 MM HG: CPT | Performed by: INTERNAL MEDICINE

## 2023-04-25 PROCEDURE — 86200 CCP ANTIBODY: CPT

## 2023-04-25 PROCEDURE — 84443 ASSAY THYROID STIM HORMONE: CPT

## 2023-04-25 PROCEDURE — 86140 C-REACTIVE PROTEIN: CPT

## 2023-04-25 PROCEDURE — 85652 RBC SED RATE AUTOMATED: CPT

## 2023-04-25 PROCEDURE — 3077F SYST BP >= 140 MM HG: CPT | Performed by: INTERNAL MEDICINE

## 2023-04-25 PROCEDURE — 86431 RHEUMATOID FACTOR QUANT: CPT

## 2023-04-25 RX ORDER — LEVOTHYROXINE, LIOTHYRONINE 57; 13.5 UG/1; UG/1
90 TABLET ORAL DAILY
COMMUNITY
Start: 2023-03-01

## 2023-04-25 NOTE — PROGRESS NOTES
Dear Dr. Yumiko Dangelo:    I saw your patient Guanako Arguelles in consultation this afternoon at your request, regarding possible Sjogren's syndrome. As you know, she is a 54-year-old woman who has had progressively dry eyes and dry mouth, since the summer of 2022. In the summer of 2022, both of her eyes became infected, and it was hard to open them. She went to a walk-in clinic and was given an antibiotic cream, which helped. It happened again in her left eye in December of 2022, and right eye January of 2023. Both eyes are very dry. They feel gritty and brisa. Her right eye feels irritated. This makes it hard to work. She is a teacher at a middle school. It can be hard to open her eyes in the morning. Her dry mouth has affected her throat and voice. Her mouth is very dry, and she has a dry cough. She has a humidifier at work. Her gums itch. She is very fatigued and achy all over. She has not had  cervical adenopathy. At night ibuprofen PM helps. She is not refreshed on awakening, and her energy is low. When she gets out of bed in the morning, she is very achy all over. Her joints are not more swollen. She has diffuse osteoarthritis. Her index finger DIP joints have enlarged. She had both hips replaced for bone-on-bone arthritis in 2017. She has been diagnosed with lumbar spinal stenosis. Labs have not been done since August of 2022, at which time TSH was low, but free T3 and free T4 were normal.  She dropped her dose of thyroid medication. Her CMP was normal, except alkaline phosphatase of 145 and glucose of 169. CBC normal.  In March of 2017, C-reactive protein was normal at 0.6, and sed rate was normal at 9. She has been using preservative-free artificial tears during the day and gel at night. She drinks a lot of liquids. She has long avoided the sun and heat. She does not break into a rash from sun exposure. Past medical history:  She has obstructive sleep apnea.   She tapes her mouth closed at night. She has lichen sclerosus in her vaginal area. She had osteopenia diagnosed. She is doing well with her depression. She has hypertension. She has adult onset diabetes with microalbuminuria. She has had fatty liver. She has high cholesterol, hypothyroidism. She had bilateral cataracts removed. She had an endometrial ablation, and 3 C-sections. She is on 1 baby aspirin daily, vitamin D daily, coenzyme Q, vitamin B12, fluoxetine 40 mg a day, magnesium, metformin, metoprolol, pravastatin, thyroid, verapamil. She is intolerant of amoxicillin and sulfa. Family history:  Negative for autoimmune disorders. Social history:  She is . 3 children. She is a . No cigarettes or alcohol. 1 cup of coffee a day. She is very exhausted when she gets off work. She does not exercise otherwise. Review of systems:  No fevers or chills. She has occasional night sweats. No anorexia or weight loss. No rash. No hair thinning of significance. No oral or nasal ulcers, of adenopathy. She can get short of breath. 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 Her chest burns occasionally. No acid reflux, stomach pain, nausea or vomiting, constipation or diarrhea, blood in her stools. She had a cancerous polyp removed from her colon. 2 colonoscopies have been fine since then. No trouble urinating. No Raynaud's. She has tension headaches. Physical exam:  Pleasant woman in no acute distress. Blood pressure 146/84, pulse 87, height 5' 9\" (1.753 m), weight 184 lb (83.5 kg), not currently breastfeeding. No rash. No alopecia. There is some conjunctival irritation. There is growth on her right lower medial eyelid, possible stye. Salivary pool looks decent. No cervical adenopathy. Lungs clear. S1 and S2 regular. Abdomen without hepatosplenomegaly or tenderness. Normal bowel sounds. No lower extremity edema.   Neck motion is mildly limited especially turning to the right. Shoulder motion is mildly limited. No synovitis in her elbows, wrist, or hands. She has Heberden's nodes in her index finger PIP DIP joints. Hip strength is good. Capillary refill is good in her fingertips. Lumbar spine flexion is limited. Hips move pretty well which have been replaced. Knees flex and extend okay. Ankles move okay. Hallux valgus bilaterally with hammertoes. Assessment and plan:    1. Progressive dry eye and dry mouth, since the summer 2022. She has had episodes possibly of conjunctivitis, in both eyes in the summer of 2022, left eye December of 2022, and right January of 2023. There is some conjunctival injection bilaterally, and she may have a stye on her right lower eyelid. She is scheduled to see Dr. Trey Ferrell, ophthalmologist, at the end of May. Her dry mouth has affected her throat and voice. Multiple labs will be done, including SINCERE, specific antibodies, complements, inflammation markers, serum protein electrophoresis, rheumatoid factor, CCP antibody. She was given the up-to-date article on Sjogren's. She will follow-up in 1-1/2 weeks to review the results. 2.  Diffuse osteoarthritis. Status post bilateral hip replacements. 3.  Diffuse myofascial pain syndrome, nonrestorative sleep, and fatigue. Tension headaches. 4.  Severe fatigue. Thyroid tests will be repeated. She is on thyroid replacement for hypothyroidism. She takes Vitamin D supplements every day. Thank you for inviting me to participate in her care. Sincerely,      Lukas Fu MD   Rheumatology.

## 2023-04-27 LAB
ALBUMIN SERPL ELPH-MCNC: 4.43 G/DL (ref 3.75–5.21)
ALBUMIN/GLOB SERPL: 1.54 {RATIO} (ref 1–2)
ALPHA1 GLOB SERPL ELPH-MCNC: 0.28 G/DL (ref 0.19–0.46)
ALPHA2 GLOB SERPL ELPH-MCNC: 0.8 G/DL (ref 0.48–1.05)
B-GLOBULIN SERPL ELPH-MCNC: 0.86 G/DL (ref 0.68–1.23)
CCP IGG SERPL-ACNC: 1 U/ML (ref 0–6.9)
ENA RNP IGG SER IA-ACNC: 1 U/ML
ENA SM IGG SER IA-ACNC: <0.7 U/ML
GAMMA GLOB SERPL ELPH-MCNC: 0.93 G/DL (ref 0.62–1.7)
NUCLEAR IGG TITR SER IF: NEGATIVE {TITER}
PROT SERPL-MCNC: 7.3 G/DL (ref 6.4–8.2)
U1 SNRNP IGG SER IA-ACNC: 0.9 U/ML

## 2023-04-28 LAB
DSDNA AB TITR SER: <10 {TITER}
ENA SS-A IGG SER IA-ACNC: <0.4 U/ML
ENA SS-B IGG SER IA-ACNC: <0.4 U/ML

## 2023-05-05 ENCOUNTER — OFFICE VISIT (OUTPATIENT)
Dept: RHEUMATOLOGY | Facility: CLINIC | Age: 68
End: 2023-05-05

## 2023-05-05 VITALS
HEART RATE: 81 BPM | HEIGHT: 69 IN | BODY MASS INDEX: 27.25 KG/M2 | SYSTOLIC BLOOD PRESSURE: 136 MMHG | DIASTOLIC BLOOD PRESSURE: 89 MMHG | WEIGHT: 184 LBS

## 2023-05-05 DIAGNOSIS — M15.9 PRIMARY OSTEOARTHRITIS INVOLVING MULTIPLE JOINTS: Primary | ICD-10-CM

## 2023-05-05 DIAGNOSIS — M79.18 DIFFUSE MYOFASCIAL PAIN SYNDROME: ICD-10-CM

## 2023-05-05 PROCEDURE — 3079F DIAST BP 80-89 MM HG: CPT | Performed by: INTERNAL MEDICINE

## 2023-05-05 PROCEDURE — 3075F SYST BP GE 130 - 139MM HG: CPT | Performed by: INTERNAL MEDICINE

## 2023-05-05 PROCEDURE — 3008F BODY MASS INDEX DOCD: CPT | Performed by: INTERNAL MEDICINE

## 2023-05-05 PROCEDURE — 99213 OFFICE O/P EST LOW 20 MIN: CPT | Performed by: INTERNAL MEDICINE

## 2023-05-05 NOTE — PROGRESS NOTES
Dear Dr. Gabbi Schulte:    I saw your patient Murtaza Vyas in follow-up this afternoon in my rheumatology clinic. We reviewed her results from 4/25/2023. Her CMP is normal, except glucose of 218, ALT 96, AST 57. CBC normal.  SPEP normal.  SINCERE negative. Specific antibodies negative to double-stranded DNA, [place], RNP, SSA, and SSB. C3 and C4 complements are normal.  Rheumatoid factor and CCP antibody are negative. TSH was normal at 1.830. Both free T3 and free T4 are low normal.  She is on thyroid replacement. Sed rate is normal at 25, and C-reactive protein is borderline elevated at 0.58. Her dry eyes and dry mouth are the same. Physical exam:  Pleasant woman in no acute distress. Blood pressure 136/89, pulse 81, height 5' 9\" (1.753 m), weight 184 lb (83.5 kg), not currently breastfeeding. Otherwise unchanged. Assessment and plan:    1. Progressive dry eye and dry mouth, since the summer 2022. She has had episodes possibly of conjunctivitis, in both eyes in the summer of 2022, left eye December of 2022, and right January of 2023. There is some conjunctival injection bilaterally, and she may have a stye on her right lower eyelid. She is scheduled to see Dr. Debbie Londono, ophthalmologist, 5/20/2023. Her dry mouth has affected her throat and voice. Her labs are negative for autoimmune disease, including Sjogren's Syndrome. She needs to take good care of her eyes and teeth. She will schedule an appointment with her dentist.    I will see her back when and if needed. 2.  Diffuse osteoarthritis. Status post bilateral hip replacements. I encouraged her to increase her exercise and strengthening program.    3.  Diffuse myofascial pain syndrome, nonrestorative sleep, and fatigue. Tension headaches. 4.  Severe fatigue. She is on thyroid replacement for hypothyroidism.   Her free T3 and free T4 levels are both at lower limits of normal.  Her TSH is normal.      Thanks again for inviting me to participate in her care. Sincerely,      Joss Juarez MD   Rheumatology.

## 2023-05-05 NOTE — TELEPHONE ENCOUNTER
[employer] message sent to do the blood test prior to the appointment. Future Appointments   Date Time Provider Miriam Fan   5/30/2023 11:00 AM Lissett Ruiz DO [facility] 10 OP         Please review; protocol failed/no protocol.      Requested Prescriptions   Pending Prescriptions Disp Refills    VERAPAMIL  MG Oral Tab CR [Pharmacy Med Name: VERAPAMIL ER 120MG TABLETS] 90 tablet 1     Sig: TAKE 1 TABLET(120 MG) BY MOUTH DAILY       Hypertensive Medications Protocol Failed - 5/5/2023  8:17 AM        Failed - Last BP reading less than 140/90     BP Readings from Last 1 Encounters:  05/05/23 : 136/89                Failed - In person appointment or virtual visit in the past 6 months     Recent Outpatient Visits              Today Primary osteoarthritis involving multiple joints    Elke Richardson, Fabiana Mars MD    Office Visit    1 week ago Bilateral dry eyes    [facility], Maliha Sosa MD    Office Visit    7 months ago Encounter for routine adult health examination without abnormal findings    [facility], [address] [place], [place]    Office Visit    1 year ago Sensorineural hearing loss, bilateral    Faye Duenas, [address], [place], Roylene Cord, Au.D    Office Visit    1 year ago Sensorineural hearing loss, bilateral    [facility], [place], Roylene Cord, Au.D    Office Visit          Future Appointments         Provider Department Appt Notes    In 3 weeks Faye Kay [address], [employer], extreme fatigue               Passed - In person appointment in the past 12 or next 3 months     Recent Outpatient Visits              Today Primary osteoarthritis involving multiple joints    [place] Richardson, Ricky Allen MD    Office Visit 1 week ago Bilateral dry eyes    [facility], [facility], Joseph Wiseman MD    Office Visit    7 months ago Encounter for routine adult health examination without abnormal findings    [address], [place]    Office Visit    1 year ago Sensorineural hearing loss, bilateral    [facility], [address], [place], Zahra Haff, Au.D    Office Visit    1 year ago Sensorineural hearing loss, bilateral    [facility], [address], [place], Zahra Haff, Au.D    Office Visit          Future Appointments         Provider Department Appt Notes    In 3 weeks Jigna Murphy [facility], [employer], extreme fatigue               Passed - CMP or BMP in past 6 months     Recent Results (from the past 4392 hour(s))   COMP METABOLIC PANEL (14)    Collection Time: 04/25/23  1:25 PM   Result Value Ref Range    Glucose 218 (H) 70 - 99 mg/dL    Sodium 138 136 - 145 mmol/L    Potassium 4.5 3.5 - 5.1 mmol/L    Chloride 103 98 - 112 mmol/L    CO2 26.0 21.0 - 32.0 mmol/L    Anion Gap 9 0 - 18 mmol/L    BUN 21 (H) 7 - 18 mg/dL    Creatinine 0.67 0.55 - 1.02 mg/dL    BUN/CREA Ratio 31.3 (H) 10.0 - 20.0    Calcium, Total 9.6 8.5 - 10.1 mg/dL    Calculated Osmolality 296 (H) 275 - 295 mOsm/kg    eGFR-Cr 95 >=60 mL/min/1.73m2    ALT 96 (H) 13 - 56 U/L    AST 57 (H) 15 - 37 U/L    Alkaline Phosphatase 124 55 - 142 U/L    Bilirubin, Total 0.4 0.1 - 2.0 mg/dL    Total Protein 7.9 6.4 - 8.2 g/dL    Albumin 3.8 3.4 - 5.0 g/dL    Globulin  4.1 2.8 - 4.4 g/dL    A/G Ratio 0.9 (L) 1.0 - 2.0    Patient Fasting for CMP? No      *Note: Due to a large number of results and/or encounters for the requested time period, some results have not been displayed. A complete set of results can be found in Results Review.                  Passed - EGFRCR or GFRNAA > 50     GFR Evaluation  EGFRCR: 95 , resulted on 4/25/2023              METFORMIN HCL 1000 MG Oral Tab [Pharmacy Med Name: METFORMIN 1000MG TABLETS] 180 tablet 1     Sig: TAKE 1 TABLET(1000 MG) BY MOUTH TWICE DAILY WITH MEALS       Diabetes Medication Protocol Failed - 5/5/2023  8:17 AM        Failed - Last A1C < 7.5 and within past 6 months     Lab Results   Component Value Date    A1C 7.0 (H) 08/29/2022               Passed - In person appointment or virtual visit in the past 6 mos or appointment in next 3 mos     Recent Outpatient Visits              Today Primary osteoarthritis involving multiple joints    [facility], [address], Mushtaq Kennedy MD    Office Visit    1 week ago Bilateral dry eyes    [facility], [address] [place] [address], Raul Bruno MD    Office Visit    7 months ago Encounter for routine adult health examination without abnormal findings    [address] [place] [address], [address] [place] [address]    Office Visit    1 year ago Sensorineural hearing loss, bilateral    [address] [place], [place] [address]Aura Marinell Stanford, Au.D    Office Visit    1 year ago Sensorineural hearing loss, bilateral    [address] [place] [address], [facility]    Office Visit          Future Appointments         Provider Department Appt Notes    In 3 weeks Marcelo Evelia, [address], [address], [employer], extreme fatigue               Passed - EGFRCR or GFRNAA > 50     GFR Evaluation  EGFRCR: 95 , resulted on 4/25/2023            Passed - GFR in the past 12 months              Recent Outpatient Visits              Today Primary osteoarthritis involving multiple joints    [address], [address], Mushtaq Kennedy MD    Office Visit    1 week ago Bilateral dry eyes    [address], [facility]    Office Visit    7 months ago Encounter for routine adult health examination without abnormal findings    [address], [place]    Office Visit    1 year ago Sensorineural hearing loss, bilateral    [facility], [address], Gemma Chavarria, Au.SONIDO    Office Visit    1 year ago Sensorineural hearing loss, bilateral    [facility], [address], Gemma Chavarria, Au.D    Office Visit             Future Appointments         Provider Department Appt Notes    In 3 weeks Bethany Mercado, Fabiana Chanel Blepharditis, extreme [place]

## 2023-05-17 ENCOUNTER — PATIENT MESSAGE (OUTPATIENT)
Facility: CLINIC | Age: 68
End: 2023-05-17

## 2023-05-17 DIAGNOSIS — Z00.00 ROUTINE GENERAL MEDICAL EXAMINATION AT A HEALTH CARE FACILITY: Primary | ICD-10-CM

## 2023-05-18 NOTE — TELEPHONE ENCOUNTER
Dr. Breonna Stevens, please see patient's MyChart message below and advise on any lab orders before upcoming appointment. Thank you.   Future Appointments   Date Time Provider Miriam Fan   5/30/2023 11:00 AM José Luis Tran DO [facility] 10 OP

## 2023-05-18 NOTE — TELEPHONE ENCOUNTER
From: Lawson Leiva  To: Ceci Carcamo   Sent: 5/17/2023 8:10 AM CDT  Subject: Blood Tests    HI Doctor Johanna Guzman,    I have an appointment on Tuesday, May 30th at 11 am.  Recently, I had bloodwork ordered by Dr. Jj Torre for Sjogren's Syndrome. Could you review those results, and when ordering tests for May 30 make sure there are no duplications. I will come in for the tests at the beginning of next week. See you soon.     Thanks,  Lawson Leiva

## 2023-05-23 ENCOUNTER — LAB ENCOUNTER (OUTPATIENT)
Dept: LAB | Facility: REFERENCE LAB | Age: 68
End: 2023-05-23
Attending: FAMILY MEDICINE
Payer: COMMERCIAL

## 2023-05-23 DIAGNOSIS — Z00.00 ROUTINE GENERAL MEDICAL EXAMINATION AT A HEALTH CARE FACILITY: ICD-10-CM

## 2023-05-23 LAB
ALBUMIN SERPL-MCNC: 3.7 G/DL (ref 3.4–5)
ALBUMIN/GLOB SERPL: 1 {RATIO} (ref 1–2)
ALP LIVER SERPL-CCNC: 125 U/L
ALT SERPL-CCNC: 81 U/L
ANION GAP SERPL CALC-SCNC: 8 MMOL/L (ref 0–18)
AST SERPL-CCNC: 42 U/L (ref 15–37)
BILIRUB SERPL-MCNC: 0.4 MG/DL (ref 0.1–2)
BUN BLD-MCNC: 18 MG/DL (ref 7–18)
BUN/CREAT SERPL: 29 (ref 10–20)
CALCIUM BLD-MCNC: 9.2 MG/DL (ref 8.5–10.1)
CHLORIDE SERPL-SCNC: 103 MMOL/L (ref 98–112)
CHOLEST SERPL-MCNC: 268 MG/DL (ref ?–200)
CO2 SERPL-SCNC: 27 MMOL/L (ref 21–32)
CREAT BLD-MCNC: 0.62 MG/DL
CREAT UR-SCNC: 126 MG/DL
EST. AVERAGE GLUCOSE BLD GHB EST-MCNC: 194 MG/DL (ref 68–126)
FASTING PATIENT LIPID ANSWER: YES
FASTING STATUS PATIENT QL REPORTED: YES
GFR SERPLBLD BASED ON 1.73 SQ M-ARVRAT: 97 ML/MIN/1.73M2 (ref 60–?)
GLOBULIN PLAS-MCNC: 3.8 G/DL (ref 2.8–4.4)
GLUCOSE BLD-MCNC: 224 MG/DL (ref 70–99)
HBA1C MFR BLD: 8.4 % (ref ?–5.7)
HDLC SERPL-MCNC: 50 MG/DL (ref 40–59)
LDLC SERPL CALC-MCNC: 180 MG/DL (ref ?–100)
MICROALBUMIN UR-MCNC: 5.44 MG/DL
MICROALBUMIN/CREAT 24H UR-RTO: 43.2 UG/MG (ref ?–30)
NONHDLC SERPL-MCNC: 218 MG/DL (ref ?–130)
OSMOLALITY SERPL CALC.SUM OF ELEC: 295 MOSM/KG (ref 275–295)
POTASSIUM SERPL-SCNC: 4.4 MMOL/L (ref 3.5–5.1)
PROT SERPL-MCNC: 7.5 G/DL (ref 6.4–8.2)
SODIUM SERPL-SCNC: 138 MMOL/L (ref 136–145)
TRIGL SERPL-MCNC: 202 MG/DL (ref 30–149)
VLDLC SERPL CALC-MCNC: 42 MG/DL (ref 0–30)

## 2023-05-23 PROCEDURE — 80061 LIPID PANEL: CPT | Performed by: FAMILY MEDICINE

## 2023-05-23 PROCEDURE — 3061F NEG MICROALBUMINURIA REV: CPT | Performed by: FAMILY MEDICINE

## 2023-05-23 PROCEDURE — 82043 UR ALBUMIN QUANTITATIVE: CPT | Performed by: FAMILY MEDICINE

## 2023-05-23 PROCEDURE — 3052F HG A1C>EQUAL 8.0%<EQUAL 9.0%: CPT | Performed by: FAMILY MEDICINE

## 2023-05-23 PROCEDURE — 3060F POS MICROALBUMINURIA REV: CPT | Performed by: FAMILY MEDICINE

## 2023-05-23 PROCEDURE — 80053 COMPREHEN METABOLIC PANEL: CPT | Performed by: FAMILY MEDICINE

## 2023-05-23 PROCEDURE — 83036 HEMOGLOBIN GLYCOSYLATED A1C: CPT | Performed by: FAMILY MEDICINE

## 2023-05-23 PROCEDURE — 82570 ASSAY OF URINE CREATININE: CPT | Performed by: FAMILY MEDICINE

## 2023-05-30 ENCOUNTER — OFFICE VISIT (OUTPATIENT)
Facility: CLINIC | Age: 68
End: 2023-05-30
Payer: COMMERCIAL

## 2023-05-30 VITALS
WEIGHT: 182 LBS | SYSTOLIC BLOOD PRESSURE: 122 MMHG | BODY MASS INDEX: 26.96 KG/M2 | HEART RATE: 93 BPM | HEIGHT: 69 IN | OXYGEN SATURATION: 97 % | DIASTOLIC BLOOD PRESSURE: 74 MMHG

## 2023-05-30 DIAGNOSIS — I10 HYPERTENSION WITH GOAL BLOOD PRESSURE LESS THAN 140/90: ICD-10-CM

## 2023-05-30 DIAGNOSIS — R80.9 TYPE 2 DIABETES MELLITUS WITH MICROALBUMINURIA, WITHOUT LONG-TERM CURRENT USE OF INSULIN (HCC): Primary | ICD-10-CM

## 2023-05-30 DIAGNOSIS — E11.29 TYPE 2 DIABETES MELLITUS WITH MICROALBUMINURIA, WITHOUT LONG-TERM CURRENT USE OF INSULIN (HCC): Primary | ICD-10-CM

## 2023-05-30 DIAGNOSIS — Z23 NEED FOR VACCINATION: ICD-10-CM

## 2023-05-30 DIAGNOSIS — E78.2 MIXED HYPERLIPIDEMIA: ICD-10-CM

## 2023-05-30 DIAGNOSIS — E03.9 HYPOTHYROIDISM, UNSPECIFIED TYPE: ICD-10-CM

## 2023-05-30 PROCEDURE — 3078F DIAST BP <80 MM HG: CPT | Performed by: FAMILY MEDICINE

## 2023-05-30 PROCEDURE — 3074F SYST BP LT 130 MM HG: CPT | Performed by: FAMILY MEDICINE

## 2023-05-30 PROCEDURE — 3008F BODY MASS INDEX DOCD: CPT | Performed by: FAMILY MEDICINE

## 2023-05-30 PROCEDURE — 90677 PCV20 VACCINE IM: CPT | Performed by: FAMILY MEDICINE

## 2023-05-30 PROCEDURE — 99214 OFFICE O/P EST MOD 30 MIN: CPT | Performed by: FAMILY MEDICINE

## 2023-05-30 PROCEDURE — 90471 IMMUNIZATION ADMIN: CPT | Performed by: FAMILY MEDICINE

## 2023-05-30 RX ORDER — DULAGLUTIDE 0.75 MG/.5ML
0.75 INJECTION, SOLUTION SUBCUTANEOUS WEEKLY
Refills: 0 | COMMUNITY
Start: 2023-05-30

## 2023-05-30 RX ORDER — PRAVASTATIN SODIUM 20 MG
20 TABLET ORAL EVERY OTHER DAY
Qty: 90 TABLET | Refills: 1 | COMMUNITY
Start: 2023-05-30

## 2023-05-30 RX ORDER — ZINC SULFATE 50(220)MG
220 CAPSULE ORAL DAILY
COMMUNITY

## 2023-06-22 RX ORDER — LEVOTHYROXINE AND LIOTHYRONINE 57; 13.5 UG/1; UG/1
90 TABLET ORAL DAILY
Qty: 90 TABLET | Refills: 0 | OUTPATIENT
Start: 2023-06-22

## 2023-06-22 RX ORDER — LEVOTHYROXINE AND LIOTHYRONINE 57; 13.5 UG/1; UG/1
90 TABLET ORAL DAILY
Qty: 90 TABLET | Refills: 3 | Status: SHIPPED | OUTPATIENT
Start: 2023-06-22

## 2023-06-22 NOTE — TELEPHONE ENCOUNTER
Refill passed per [facility] protocol. Requested Prescriptions   Pending Prescriptions Disp Refills    thyroid (NP THYROID) 90 MG Oral Tab 90 tablet 3     Sig: Take 1 tablet (90 mg total) by mouth daily. Take with 15mg tablet for total of 105mg daily       Thyroid Medication Protocol Passed - 6/22/2023  3:31 PM        Passed - TSH in past 12 months        Passed - Last TSH value is normal     Lab Results   Component Value Date    TSH 1.830 04/25/2023                 Passed - In person appointment or virtual visit in the past 12 mos or appointment in next 3 mos     Recent Outpatient Visits              3 weeks ago Type 2 diabetes mellitus with microalbuminuria, without long-term current use of insulin ([facility] [address]    [address], [address], [address] [place], [place]    Office Visit    1 month ago Primary osteoarthritis involving multiple joints    [address], [address], [facility] Cristobal Rouse MD    Office Visit    1 month ago Bilateral dry eyes    [address], [facility] Shaye MD Jose Alberto    Office Visit    9 months ago Encounter for routine adult health examination without abnormal findings    [facility], [address] [place] [address]    Office Visit    1 year ago Sensorineural hearing loss, bilateral    [facility], [address] [facility],[address], [facility].D    Office Visit                       Refused Prescriptions Disp Refills    thyroid (NP THYROID) 90 MG Oral Tab 90 tablet 0     Sig: Take 1 tablet (90 mg total) by mouth daily.        Thyroid Medication Protocol Passed - 6/22/2023  3:31 PM        Passed - TSH in past 12 months        Passed - Last TSH value is normal     Lab Results   Component Value Date    TSH 1.830 04/25/2023                 Passed - In person appointment or virtual visit in the past 12 mos or appointment in next 3 mos     Recent Outpatient Visits              3 weeks ago Type 2 diabetes mellitus with microalbuminuria, without long-term current use of insulin ([address]    [address], [address], [address] [place], [place]    Office Visit    1 month ago Primary osteoarthritis involving multiple joints    [facility], [address], Ross Palm MD    Office Visit    1 month ago Bilateral dry eyes    [facility], [facility], Phu Otero MD    Office Visit    9 months ago Encounter for routine adult health examination without abnormal findings    [address], [address] [place], [place]    Office Visit    1 year ago Sensorineural hearing loss, bilateral    [address], [place], Pepelo Ohms, Au.D    Office Visit                           Recent Outpatient Visits              3 weeks ago Type 2 diabetes mellitus with microalbuminuria, without long-term current use of insulin [facility])    [address], [address], [address] [place], [place]    Office Visit    1 month ago Primary osteoarthritis involving multiple joints    [address], [address], Lesli Austin MD    Office Visit    1 month ago Bilateral dry eyes    [address], [facility], Phu Otero MD    Office Visit    9 months ago Encounter for routine adult health examination without abnormal findings    [address], [address] [place], [place]    Office Visit    1 year ago Sensorineural hearing loss, bilateral    [address], [place], Harlo Ohms, Au.D    Office Visit

## 2023-06-22 NOTE — TELEPHONE ENCOUNTER
Duplicate request, previously addressed.    Sent to same [facility] as requesting 6/10/23

## 2023-08-07 NOTE — TELEPHONE ENCOUNTER
Refill passed per "[employer] protocol.      Requested Prescriptions   Pending Prescriptions Disp Refills    VERAPAMIL  MG Oral Tab CR [Pharmacy Med Name: VERAPAMIL ER 120MG TABLETS] 90 tablet 0     Sig: TAKE 1 TABLET(120 MG) BY MOUTH DAILY       Hypertensive Medications Protocol Passed - 8/6/2023  4:36 AM        Passed - In person appointment in the past 12 or next 3 months     Recent Outpatient Visits              2 months ago Type 2 diabetes mellitus with microalbuminuria, without long-term current use of insulin ([facility]    [address], [facility], [facility] [place], [place]    Office Visit    3 months ago Primary osteoarthritis involving multiple joints    [facility], [facility], Fabiana Lang MD    Office Visit    3 months ago Bilateral dry eyes    [address], [facility], [place] Irving Hedge, MD    Office Visit    10 months ago Encounter for routine adult health examination without abnormal findings    [address] [place] [address], [address] [place] [address]    Office Visit    1 year ago Sensorineural hearing loss, bilateral    [address] [place], [place] [address]Aura Mariette Hilding, Au.D    Office Visit          Future Appointments         Provider Department Appt Notes    In 2 months Tereso Lopez, [address] [place] [address] Blood sugar - Trulicity               Passed - Last BP reading less than 140/90     BP Readings from Last 1 Encounters:  05/30/23 : 122/74              Passed - CMP or BMP in past 6 months     Recent Results (from the past 4392 hour(s))   COMP METABOLIC PANEL (14)    Collection Time: 05/23/23  8:06 AM   Result Value Ref Range    Glucose 224 (H) 70 - 99 mg/dL    Sodium 138 136 - 145 mmol/L    Potassium 4.4 3.5 - 5.1 mmol/L    Chloride 103 98 - 112 mmol/L    CO2 27.0 21.0 - 32.0 mmol/L    Anion Gap 8 0 - 18 mmol/L    BUN 18 7 - 18 mg/dL    Creatinine 0. 62 0.55 - 1.02 mg/dL    BUN/CREA Ratio 29.0 (H) 10.0 - 20.0    Calcium, Total 9.2 8.5 - 10.1 mg/dL    Calculated Osmolality 295 275 - 295 mOsm/kg    eGFR-Cr 97 >=60 mL/min/1.73m2    ALT 81 (H) 13 - 56 U/L    AST 42 (H) 15 - 37 U/L    Alkaline Phosphatase 125 55 - 142 U/L    Bilirubin, Total 0.4 0.1 - 2.0 mg/dL    Total Protein 7.5 6.4 - 8.2 g/dL    Albumin 3.7 3.4 - 5.0 g/dL    Globulin  3.8 2.8 - 4.4 g/dL    A/G Ratio 1.0 1.0 - 2.0    Patient Fasting for CMP? Yes      *Note: Due to a large number of results and/or encounters for the requested time period, some results have not been displayed. A complete set of results can be found in Results Review.                Passed - In person appointment or virtual visit in the past 6 months     Recent Outpatient Visits              2 months ago Type 2 diabetes mellitus with microalbuminuria, without long-term current use of insulin [facility])    [address], [address] [place], [place]    Office Visit    3 months ago Primary osteoarthritis involving multiple joints    [facility], [address], Darlin Grayson MD    Office Visit    3 months ago Bilateral dry eyes    Christian Solorzano, [facility], Jia Sheldon MD    Office Visit    10 months ago Encounter for routine adult health examination without abnormal findings    [address] [facility], [address] [place], [place]    Office Visit    1 year ago Sensorineural hearing loss, bilateral    Christian Solorzano, [address], Rupinder Chavarria Au.D    Office Visit          Future Appointments         Provider Department Appt Notes    In 2 months Daryn Moore, [address], [facility] Blood sugar - Trulicity               Passed - EGFRCR or GFRNAA > 50     GFR Evaluation  EGFRCR: 97 , resulted on 5/23/2023                Recent Outpatient Visits              2 months ago Type 2 diabetes mellitus with microalbuminuria, without long-term current use of insulin [facility])    [address], [address], [address] [place], [place]    Office Visit    3 months ago Primary osteoarthritis involving multiple joints    [facility], [address], [address] Jeimy Ayala MD    Office Visit    3 months ago Bilateral dry eyes    [address], [facility], Maulik Weems MD    Office Visit    10 months ago Encounter for routine adult health examination without abnormal findings    John Bustill[facility]an, [address] [place], [place]    Office Visit    1 year ago Sensorineural hearing loss, bilateral    [address], [address] [facility],[address], [facility] Au.D    Office Visit             Future Appointments         Provider Department Appt Notes    In 2 months Nadira Phlegm, [address], [address], [address] Blood sugar - Trulicity

## 2023-09-14 ENCOUNTER — PATIENT MESSAGE (OUTPATIENT)
Facility: CLINIC | Age: 68
End: 2023-09-14

## 2023-09-14 DIAGNOSIS — R80.9 TYPE 2 DIABETES MELLITUS WITH MICROALBUMINURIA, WITHOUT LONG-TERM CURRENT USE OF INSULIN: ICD-10-CM

## 2023-09-14 DIAGNOSIS — E11.29 TYPE 2 DIABETES MELLITUS WITH MICROALBUMINURIA, WITHOUT LONG-TERM CURRENT USE OF INSULIN: ICD-10-CM

## 2023-09-14 DIAGNOSIS — K76.0 FATTY LIVER: ICD-10-CM

## 2023-09-14 DIAGNOSIS — E78.2 MIXED HYPERLIPIDEMIA: Primary | ICD-10-CM

## 2023-09-24 RX ORDER — FLUOXETINE HYDROCHLORIDE 40 MG/1
40 CAPSULE ORAL DAILY
Qty: 90 CAPSULE | Refills: 1 | OUTPATIENT
Start: 2023-09-24

## 2023-09-25 RX ORDER — DULAGLUTIDE 1.5 MG/.5ML
1.5 INJECTION, SOLUTION SUBCUTANEOUS WEEKLY
Qty: 6 ML | Refills: 0 | Status: SHIPPED | OUTPATIENT
Start: 2023-09-25

## 2023-09-25 RX ORDER — FLUOXETINE HYDROCHLORIDE 40 MG/1
40 CAPSULE ORAL DAILY
Qty: 90 CAPSULE | Refills: 3 | Status: SHIPPED | OUTPATIENT
Start: 2023-09-25

## 2023-09-25 NOTE — TELEPHONE ENCOUNTER
Please review; protocol failed. Requested Prescriptions   Pending Prescriptions Disp Refills    Dulaglutide (TRULICITY) 1.5 IN/4.1RX Subcutaneous Solution Pen-injector 6 mL 0     Sig: Inject 1.5 mg into the skin once a week.        Diabetes Medication Protocol Failed - 9/24/2023  5:14 AM        Failed - Last A1C < 7.5 and within past 6 months     Lab Results   Component Value Date    A1C 8.4 (H) 05/23/2023             Passed - In person appointment or virtual visit in the past 6 mos or appointment in next 3 mos     Recent Outpatient Visits              3 months ago Type 2 diabetes mellitus with microalbuminuria, without long-term current use of insulin [facility])    [address], [facility], [facility] [place], [place]    Office Visit    4 months ago Primary osteoarthritis involving multiple joints    [facility], [place] [address], [facility] Carlos Solano MD    Office Visit    5 months ago Bilateral dry eyes    [address], [facility] Hilaria De Jesus MD    Office Visit    1 year ago Encounter for routine adult health examination without abnormal findings    [address], [address] [place], [place]    Office Visit    1 year ago Sensorineural hearing loss, bilateral    Deryl President, Patricio Chavarria Au.D    Office Visit          Future Appointments         Provider Department Appt Notes    In 2 weeks Makayla Campos, [address], [place] [address], [facility] Blood sugar - Trulicity    In 2 weeks Boubacar Ferrari DO [address], [facility], [address] [place] intermittent bleeding                    Passed - EGFRCR or GFRNAA > 50     GFR Evaluation  EGFRCR: 97 , resulted on 5/23/2023          Passed - GFR in the past 12 months         Signed Prescriptions Disp Refills    FLUoxetine HCl 40 MG Oral Cap 90 capsule 3     Sig: Take 1 capsule (40 mg total) by mouth daily.        Psychiatric Non-Scheduled (Anti-Anxiety) Passed - 9/24/2023  5:14 AM        Passed - In person appointment or virtual visit in the past 6 mos or appointment in next 3 mos     Recent Outpatient Visits              3 months ago Type 2 diabetes mellitus with microalbuminuria, without long-term current use of insulin ([facility]    [address], [facility] [place], [place]    Office Visit    4 months ago Primary osteoarthritis involving multiple joints    [facility], [address], Fabiana Bettencourt MD    Office Visit    5 months ago Bilateral dry eyes    [facility]    Office Visit    1 year ago Encounter for routine adult health examination without abnormal findings    [address], [facility] [place], [place]    Office Visit    1 year ago Sensorineural hearing loss, bilateral    [address], [place], Menendez Orn, Au.D    Office Visit          Future Appointments         Provider Department Appt Notes    In 2 weeks Bethany Mercado, [address], Fabiana ybarra Blood sugar - Trulicity    In 2 weeks Rosy Currie, [address], [address] [place] intermittent bleeding                       Recent Outpatient Visits              3 months ago Type 2 diabetes mellitus with microalbuminuria, without long-term current use of insulin [facility])    [address], [place], [place]    Office Visit    4 months ago Primary osteoarthritis involving multiple joints    [address], Isaac Oro MD    Office Visit    5 months ago Bilateral dry eyes    [facility]aine [facility]    Office Visit    1 year ago Encounter for routine adult health examination without abnormal findings    [address], [place]    Office Visit    1 year ago Sensorineural hearing loss, bilateral    [facility], [address], Wayne Chavarria Au.D    Office Visit          Future Appointments         Provider Department Appt Notes    In 2 weeks Gabe Powers, [address], [address], [facility] Blood sugar - Trulicity    In 2 weeks Selam Gonzales, DO Brittany Leigh, [address] intermittent bleeding

## 2023-09-25 NOTE — TELEPHONE ENCOUNTER
Refill passed per [employer], [facility] protocol. Requested Prescriptions   Pending Prescriptions Disp Refills    FLUoxetine HCl 40 MG Oral Cap 90 capsule 1     Sig: Take 1 capsule (40 mg total) by mouth daily. Psychiatric Non-Scheduled (Anti-Anxiety) Passed - 9/24/2023  5:14 AM        Passed - In person appointment or virtual visit in the past 6 mos or appointment in next 3 mos     Recent Outpatient Visits              3 months ago Type 2 diabetes mellitus with microalbuminuria, without long-term current use of insulin ([facility]    [address], [facility], [id number] [place], [place]    Office Visit    4 months ago Primary osteoarthritis involving multiple joints    [facility], [facility], [address] Love Hui MD    Office Visit    5 months ago Bilateral dry eyes    [address], [facility], Maria Teresa Talley MD    Office Visit    1 year ago Encounter for routine adult health examination without abnormal findings    [address] [place] [address], [address] [place] [address]    Office Visit    1 year ago Sensorineural hearing loss, bilateral    [address] [place] [address], Fatuma Chavarria Au.D    Office Visit          Future Appointments         Provider Department Appt Notes    In 2 weeks Hawk Gladis, [address], [facility], [address] Blood sugar - Trulicity    In 2 weeks Chestine Hobart, DO [address] [place] [address], [address] intermittent bleeding                      Dulaglutide (TRULICITY) 1.5 UA/6.5CL Subcutaneous Solution Pen-injector 6 mL 0     Sig: Inject 1.5 mg into the skin once a week.        Diabetes Medication Protocol Failed - 9/24/2023  5:14 AM        Failed - Last A1C < 7.5 and within past 6 months     Lab Results   Component Value Date    A1C 8.4 (H) 05/23/2023             Passed - In person appointment or virtual visit in the past 6 mos or appointment in next 3 mos     Recent Outpatient Visits              3 months ago Type 2 diabetes mellitus with microalbuminuria, without long-term current use of insulin [facility])    [address], [address], [address] [place], [place]    Office Visit    4 months ago Primary osteoarthritis involving multiple joints    [facility], [address], [address] Ricco Queen MD    Office Visit    5 months ago Bilateral dry eyes    [address], [place], Servando Patel MD    Office Visit    1 year ago Encounter for routine adult health examination without abnormal findings    [address], [address] [place], [place]    Office Visit    1 year ago Sensorineural hearing loss, bilateral    [facility] Rosey, [place], Verner Amber, Au.D    Office Visit          Future Appointments         Provider Department Appt Notes    In 2 weeks Jaison Amble, [address], [address], [address] Blood sugar - Trulicity    In 2 weeks Abbi Garcia, DO [address], [address], [address] intermittent bleeding                    Passed - EGFRCR or GFRNAA > 50     GFR Evaluation  EGFRCR: 97 , resulted on 5/23/2023          Passed - GFR in the past 12 months           Recent Outpatient Visits              3 months ago Type 2 diabetes mellitus with microalbuminuria, without long-term current use of insulin [facility])    [address], [address], [place], [place]    Office Visit    4 months ago Primary osteoarthritis involving multiple joints    [address], Fely Tolbert MD    Office Visit    5 months ago Bilateral dry eyes    [address], [place], Servando Patel MD    Office Visit    1 year ago Encounter for routine adult health examination without abnormal findings [address], [place]    Office Visit    1 year ago Sensorineural hearing loss, bilateral    [facility], [address], Gemma Chavarria Au.D    Office Visit          Future Appointments         Provider Department Appt Notes    In 2 weeks Bethany Mercado, [address], [address], [facility] Blood sugar - Trulicity    In 2 weeks Rosy Currie,  [facility], [address] intermittent bleeding

## 2023-09-27 RX ORDER — DULAGLUTIDE 1.5 MG/.5ML
1.5 INJECTION, SOLUTION SUBCUTANEOUS
Qty: 6 ML | Refills: 0 | OUTPATIENT
Start: 2023-09-27

## 2023-09-28 NOTE — TELEPHONE ENCOUNTER
Duplicate request, previously addressed. Rx sent to pharm on 9/25/23.     Requested Prescriptions   Pending Prescriptions Disp Refills    TRULICITY 1.5 CRISOSTOMO/7.9CV Subcutaneous Solution Pen-injector [Pharmacy Med Name: Shae Stewart 4.5QH/7.6OM SDP 0.5ML] 6 mL 0     Sig: ADMINISTER 1.5 MG UNDER THE SKIN 1 TIME A WEEK       Diabetes Medication Protocol Failed - 9/27/2023  8:29 AM        Failed - Last A1C < 7.5 and within past 6 months     Lab Results   Component Value Date    A1C 8.4 (H) 05/23/2023             Passed - In person appointment or virtual visit in the past 6 mos or appointment in next 3 mos     Recent Outpatient Visits              4 months ago Type 2 diabetes mellitus with microalbuminuria, without long-term current use of insulin ([facility]    [address], [employer], [facility] [place], [place]    Office Visit    4 months ago Primary osteoarthritis involving multiple joints    [facility], [employer], [address] Caroline Nguyen MD    Office Visit    5 months ago Bilateral dry eyes    [address], [facility] Megan Knox MD    Office Visit    1 year ago Encounter for routine adult health examination without abnormal findings    [address] [facility], [address] [place] [address]    Office Visit    1 year ago Sensorineural hearing loss, bilateral    [address] [facility], [facility], Isabela Face, Au.D    Office Visit          Future Appointments         Provider Department Appt Notes    In 1 week Talisha Husain, [address], [address], [address] Blood sugar - Trulicity    In 2 weeks Anabell Cobb DO [address], [employer], [address] intermittent bleeding                    Passed - EGFRCR or GFRNAA > 50     GFR Evaluation  EGFRCR: 97 , resulted on 5/23/2023          Passed - GFR in the past 12 months

## 2023-10-02 ENCOUNTER — LAB ENCOUNTER (OUTPATIENT)
Dept: LAB | Facility: REFERENCE LAB | Age: 68
End: 2023-10-02
Attending: FAMILY MEDICINE
Payer: COMMERCIAL

## 2023-10-02 DIAGNOSIS — R74.8 ELEVATED LIVER ENZYMES: Primary | ICD-10-CM

## 2023-10-02 DIAGNOSIS — E11.29 TYPE 2 DIABETES MELLITUS WITH MICROALBUMINURIA, WITHOUT LONG-TERM CURRENT USE OF INSULIN: ICD-10-CM

## 2023-10-02 DIAGNOSIS — K76.0 FATTY LIVER: ICD-10-CM

## 2023-10-02 DIAGNOSIS — R80.9 TYPE 2 DIABETES MELLITUS WITH MICROALBUMINURIA, WITHOUT LONG-TERM CURRENT USE OF INSULIN: ICD-10-CM

## 2023-10-02 DIAGNOSIS — E78.2 MIXED HYPERLIPIDEMIA: ICD-10-CM

## 2023-10-02 LAB
ALBUMIN SERPL-MCNC: 3.9 G/DL (ref 3.4–5)
ALBUMIN/GLOB SERPL: 0.8 {RATIO} (ref 1–2)
ALP LIVER SERPL-CCNC: 159 U/L
ALT SERPL-CCNC: 183 U/L
ANION GAP SERPL CALC-SCNC: 10 MMOL/L (ref 0–18)
AST SERPL-CCNC: 220 U/L (ref 15–37)
BILIRUB SERPL-MCNC: 0.4 MG/DL (ref 0.1–2)
BUN BLD-MCNC: 12 MG/DL (ref 7–18)
BUN/CREAT SERPL: 16.2 (ref 10–20)
CALCIUM BLD-MCNC: 9.7 MG/DL (ref 8.5–10.1)
CHLORIDE SERPL-SCNC: 104 MMOL/L (ref 98–112)
CHOLEST SERPL-MCNC: 245 MG/DL (ref ?–200)
CO2 SERPL-SCNC: 24 MMOL/L (ref 21–32)
CREAT BLD-MCNC: 0.74 MG/DL
EGFRCR SERPLBLD CKD-EPI 2021: 88 ML/MIN/1.73M2 (ref 60–?)
EST. AVERAGE GLUCOSE BLD GHB EST-MCNC: 246 MG/DL (ref 68–126)
FASTING PATIENT LIPID ANSWER: YES
FASTING STATUS PATIENT QL REPORTED: YES
GLOBULIN PLAS-MCNC: 4.7 G/DL (ref 2.8–4.4)
GLUCOSE BLD-MCNC: 250 MG/DL (ref 70–99)
HBA1C MFR BLD: 10.2 % (ref ?–5.7)
HDLC SERPL-MCNC: 37 MG/DL (ref 40–59)
LDLC SERPL CALC-MCNC: 174 MG/DL (ref ?–100)
NONHDLC SERPL-MCNC: 208 MG/DL (ref ?–130)
OSMOLALITY SERPL CALC.SUM OF ELEC: 294 MOSM/KG (ref 275–295)
POTASSIUM SERPL-SCNC: 4 MMOL/L (ref 3.5–5.1)
PROT SERPL-MCNC: 8.6 G/DL (ref 6.4–8.2)
SODIUM SERPL-SCNC: 138 MMOL/L (ref 136–145)
TRIGL SERPL-MCNC: 183 MG/DL (ref 30–149)
VLDLC SERPL CALC-MCNC: 37 MG/DL (ref 0–30)

## 2023-10-02 PROCEDURE — 80061 LIPID PANEL: CPT | Performed by: FAMILY MEDICINE

## 2023-10-02 PROCEDURE — 80053 COMPREHEN METABOLIC PANEL: CPT | Performed by: FAMILY MEDICINE

## 2023-10-02 PROCEDURE — 3046F HEMOGLOBIN A1C LEVEL >9.0%: CPT | Performed by: FAMILY MEDICINE

## 2023-10-02 PROCEDURE — 83036 HEMOGLOBIN GLYCOSYLATED A1C: CPT | Performed by: FAMILY MEDICINE

## 2023-10-09 ENCOUNTER — OFFICE VISIT (OUTPATIENT)
Facility: CLINIC | Age: 68
End: 2023-10-09
Payer: COMMERCIAL

## 2023-10-09 VITALS
OXYGEN SATURATION: 96 % | WEIGHT: 174 LBS | BODY MASS INDEX: 26 KG/M2 | SYSTOLIC BLOOD PRESSURE: 128 MMHG | DIASTOLIC BLOOD PRESSURE: 82 MMHG | HEART RATE: 122 BPM

## 2023-10-09 DIAGNOSIS — K76.0 FATTY LIVER: ICD-10-CM

## 2023-10-09 DIAGNOSIS — I10 HYPERTENSION WITH GOAL BLOOD PRESSURE LESS THAN 140/90: ICD-10-CM

## 2023-10-09 DIAGNOSIS — E78.2 MIXED HYPERLIPIDEMIA: ICD-10-CM

## 2023-10-09 DIAGNOSIS — R80.9 TYPE 2 DIABETES MELLITUS WITH MICROALBUMINURIA, WITHOUT LONG-TERM CURRENT USE OF INSULIN: Primary | ICD-10-CM

## 2023-10-09 DIAGNOSIS — E11.29 TYPE 2 DIABETES MELLITUS WITH MICROALBUMINURIA, WITHOUT LONG-TERM CURRENT USE OF INSULIN: Primary | ICD-10-CM

## 2023-10-09 PROCEDURE — 3074F SYST BP LT 130 MM HG: CPT | Performed by: FAMILY MEDICINE

## 2023-10-09 PROCEDURE — 3079F DIAST BP 80-89 MM HG: CPT | Performed by: FAMILY MEDICINE

## 2023-10-09 PROCEDURE — 99214 OFFICE O/P EST MOD 30 MIN: CPT | Performed by: FAMILY MEDICINE

## 2023-10-09 PROCEDURE — 3072F LOW RISK FOR RETINOPATHY: CPT | Performed by: FAMILY MEDICINE

## 2023-10-09 RX ORDER — METOPROLOL SUCCINATE 25 MG/1
25 TABLET, EXTENDED RELEASE ORAL DAILY
Qty: 90 TABLET | Refills: 1 | Status: SHIPPED | OUTPATIENT
Start: 2023-10-09

## 2023-10-09 RX ORDER — TIRZEPATIDE 7.5 MG/.5ML
7.5 INJECTION, SOLUTION SUBCUTANEOUS WEEKLY
Qty: 6 ML | Refills: 0 | Status: SHIPPED | OUTPATIENT
Start: 2023-10-09

## 2023-10-13 ENCOUNTER — OFFICE VISIT (OUTPATIENT)
Dept: OBGYN CLINIC | Facility: CLINIC | Age: 68
End: 2023-10-13
Payer: COMMERCIAL

## 2023-10-13 VITALS
HEIGHT: 69 IN | BODY MASS INDEX: 25.48 KG/M2 | SYSTOLIC BLOOD PRESSURE: 158 MMHG | WEIGHT: 172 LBS | DIASTOLIC BLOOD PRESSURE: 92 MMHG

## 2023-10-13 DIAGNOSIS — N95.0 POSTMENOPAUSAL BLEEDING: Primary | ICD-10-CM

## 2023-10-13 PROCEDURE — 99213 OFFICE O/P EST LOW 20 MIN: CPT | Performed by: OBSTETRICS & GYNECOLOGY

## 2023-10-16 ENCOUNTER — HOSPITAL ENCOUNTER (OUTPATIENT)
Dept: ULTRASOUND IMAGING | Facility: HOSPITAL | Age: 68
Discharge: HOME | End: 2023-10-16
Attending: FAMILY MEDICINE
Payer: COMMERCIAL

## 2023-10-16 ENCOUNTER — ULTRASOUND ENCOUNTER (OUTPATIENT)
Dept: OBGYN CLINIC | Facility: CLINIC | Age: 68
End: 2023-10-16
Payer: COMMERCIAL

## 2023-10-16 DIAGNOSIS — N95.0 POSTMENOPAUSAL BLEEDING: ICD-10-CM

## 2023-10-16 DIAGNOSIS — R74.8 ELEVATED LIVER ENZYMES: ICD-10-CM

## 2023-10-16 PROCEDURE — 76705 ECHO EXAM OF ABDOMEN: CPT | Performed by: FAMILY MEDICINE

## 2023-10-16 PROCEDURE — 76981 USE PARENCHYMA: CPT | Performed by: FAMILY MEDICINE

## 2023-10-17 DIAGNOSIS — R93.89 THICKENED ENDOMETRIUM: Primary | ICD-10-CM

## 2023-10-20 ENCOUNTER — PATIENT MESSAGE (OUTPATIENT)
Dept: OBGYN CLINIC | Facility: CLINIC | Age: 68
End: 2023-10-20

## 2023-10-20 NOTE — TELEPHONE ENCOUNTER
From: Huber Bowie  To: Brianna Schulte  Sent: 10/20/2023 11:34 AM CDT  Subject: Dr. Abundio Alvarez    Could you please send my medical records to Dr. Abundio Alvarez, as I have an appointment with him on Wednesday, October 25.     Thanks,  Huber Bowie

## 2023-10-25 PROCEDURE — 88305 TISSUE EXAM BY PATHOLOGIST: CPT | Performed by: CLINICAL MEDICAL LABORATORY

## 2023-10-26 ENCOUNTER — LAB REQUISITION (OUTPATIENT)
Dept: LAB | Age: 68
End: 2023-10-26

## 2023-10-26 DIAGNOSIS — R93.89 ABNORMAL FINDINGS ON DIAGNOSTIC IMAGING OF OTHER SPECIFIED BODY STRUCTURES: ICD-10-CM

## 2023-10-26 DIAGNOSIS — N95.0 POSTMENOPAUSAL BLEEDING: ICD-10-CM

## 2023-10-27 LAB
ASR DISCLAIMER: NORMAL
CASE RPRT: NORMAL
CLINICAL INFO: NORMAL
PATH REPORT.FINAL DX SPEC: NORMAL
PATH REPORT.FINAL DX SPEC: NORMAL
PATH REPORT.GROSS SPEC: NORMAL

## 2023-11-02 ENCOUNTER — MED REC SCAN ONLY (OUTPATIENT)
Facility: CLINIC | Age: 68
End: 2023-11-02

## 2023-11-22 ENCOUNTER — LAB ENCOUNTER (OUTPATIENT)
Dept: LAB | Age: 68
End: 2023-11-22
Attending: OBSTETRICS & GYNECOLOGY
Payer: COMMERCIAL

## 2023-11-22 ENCOUNTER — EKG ENCOUNTER (OUTPATIENT)
Dept: LAB | Age: 68
End: 2023-11-22
Attending: OBSTETRICS & GYNECOLOGY
Payer: COMMERCIAL

## 2023-11-22 ENCOUNTER — HOSPITAL ENCOUNTER (OUTPATIENT)
Dept: GENERAL RADIOLOGY | Age: 68
Discharge: HOME | End: 2023-11-22
Attending: OBSTETRICS & GYNECOLOGY
Payer: COMMERCIAL

## 2023-11-22 DIAGNOSIS — Z01.818 PREOP TESTING: Primary | ICD-10-CM

## 2023-11-22 DIAGNOSIS — Z01.818 PREOP TESTING: ICD-10-CM

## 2023-11-22 LAB
ALBUMIN SERPL-MCNC: 4.6 G/DL (ref 3.2–4.8)
ALBUMIN/GLOB SERPL: 1.4 {RATIO} (ref 1–2)
ALP LIVER SERPL-CCNC: 145 U/L
ALT SERPL-CCNC: 94 U/L
ANION GAP SERPL CALC-SCNC: 9 MMOL/L (ref 0–18)
APTT PPP: 28.2 SECONDS (ref 23.3–35.6)
AST SERPL-CCNC: 60 U/L (ref ?–34)
ATRIAL RATE: 107 BPM
BASOPHILS # BLD AUTO: 0.03 X10(3) UL (ref 0–0.2)
BASOPHILS NFR BLD AUTO: 0.3 %
BILIRUB SERPL-MCNC: 0.6 MG/DL (ref 0.2–1.1)
BUN BLD-MCNC: 21 MG/DL (ref 9–23)
BUN/CREAT SERPL: 32.3 (ref 10–20)
CALCIUM BLD-MCNC: 10 MG/DL (ref 8.7–10.4)
CHLORIDE SERPL-SCNC: 108 MMOL/L (ref 98–112)
CO2 SERPL-SCNC: 22 MMOL/L (ref 21–32)
CREAT BLD-MCNC: 0.65 MG/DL
DEPRECATED RDW RBC AUTO: 44.9 FL (ref 35.1–46.3)
EGFRCR SERPLBLD CKD-EPI 2021: 95 ML/MIN/1.73M2 (ref 60–?)
EOSINOPHIL # BLD AUTO: 0.39 X10(3) UL (ref 0–0.7)
EOSINOPHIL NFR BLD AUTO: 4 %
ERYTHROCYTE [DISTWIDTH] IN BLOOD BY AUTOMATED COUNT: 13.2 % (ref 11–15)
FASTING STATUS PATIENT QL REPORTED: NO
GLOBULIN PLAS-MCNC: 3.3 G/DL (ref 2.8–4.4)
GLUCOSE BLD-MCNC: 170 MG/DL (ref 70–99)
HCT VFR BLD AUTO: 47.2 %
HGB BLD-MCNC: 15.2 G/DL
IMM GRANULOCYTES # BLD AUTO: 0.02 X10(3) UL (ref 0–1)
IMM GRANULOCYTES NFR BLD: 0.2 %
INR BLD: 0.96 (ref 0.8–1.2)
LYMPHOCYTES # BLD AUTO: 2.41 X10(3) UL (ref 1–4)
LYMPHOCYTES NFR BLD AUTO: 24.6 %
MCH RBC QN AUTO: 29.7 PG (ref 26–34)
MCHC RBC AUTO-ENTMCNC: 32.2 G/DL (ref 31–37)
MCV RBC AUTO: 92.2 FL
MONOCYTES # BLD AUTO: 0.68 X10(3) UL (ref 0.1–1)
MONOCYTES NFR BLD AUTO: 6.9 %
NEUTROPHILS # BLD AUTO: 6.26 X10 (3) UL (ref 1.5–7.7)
NEUTROPHILS # BLD AUTO: 6.26 X10(3) UL (ref 1.5–7.7)
NEUTROPHILS NFR BLD AUTO: 64 %
OSMOLALITY SERPL CALC.SUM OF ELEC: 295 MOSM/KG (ref 275–295)
P AXIS: 44 DEGREES
P-R INTERVAL: 156 MS
PLATELET # BLD AUTO: 304 10(3)UL (ref 150–450)
POTASSIUM SERPL-SCNC: 4.1 MMOL/L (ref 3.5–5.1)
PROT SERPL-MCNC: 7.9 G/DL (ref 5.7–8.2)
PROTHROMBIN TIME: 13.4 SECONDS (ref 11.6–14.8)
Q-T INTERVAL: 348 MS
QRS DURATION: 76 MS
QTC CALCULATION (BEZET): 464 MS
R AXIS: -34 DEGREES
RBC # BLD AUTO: 5.12 X10(6)UL
SODIUM SERPL-SCNC: 139 MMOL/L (ref 136–145)
T AXIS: 68 DEGREES
VENTRICULAR RATE: 107 BPM
WBC # BLD AUTO: 9.8 X10(3) UL (ref 4–11)

## 2023-11-22 PROCEDURE — 71046 X-RAY EXAM CHEST 2 VIEWS: CPT | Performed by: OBSTETRICS & GYNECOLOGY

## 2023-11-22 PROCEDURE — 85610 PROTHROMBIN TIME: CPT

## 2023-11-22 PROCEDURE — 93010 ELECTROCARDIOGRAM REPORT: CPT | Performed by: INTERNAL MEDICINE

## 2023-11-22 PROCEDURE — 85730 THROMBOPLASTIN TIME PARTIAL: CPT

## 2023-11-22 PROCEDURE — 80053 COMPREHEN METABOLIC PANEL: CPT

## 2023-11-22 PROCEDURE — 93005 ELECTROCARDIOGRAM TRACING: CPT

## 2023-11-22 PROCEDURE — 85025 COMPLETE CBC W/AUTO DIFF WBC: CPT

## 2023-11-22 PROCEDURE — 36415 COLL VENOUS BLD VENIPUNCTURE: CPT

## 2023-12-08 ENCOUNTER — OFFICE VISIT (OUTPATIENT)
Facility: CLINIC | Age: 68
End: 2023-12-08
Payer: COMMERCIAL

## 2023-12-08 VITALS
DIASTOLIC BLOOD PRESSURE: 82 MMHG | WEIGHT: 167 LBS | HEIGHT: 69 IN | SYSTOLIC BLOOD PRESSURE: 118 MMHG | HEART RATE: 109 BPM | BODY MASS INDEX: 24.73 KG/M2 | OXYGEN SATURATION: 97 %

## 2023-12-08 DIAGNOSIS — N95.0 POSTMENOPAUSAL BLEEDING: ICD-10-CM

## 2023-12-08 DIAGNOSIS — I10 HYPERTENSION WITH GOAL BLOOD PRESSURE LESS THAN 140/90: ICD-10-CM

## 2023-12-08 DIAGNOSIS — E03.9 HYPOTHYROIDISM, UNSPECIFIED TYPE: ICD-10-CM

## 2023-12-08 DIAGNOSIS — E11.29 TYPE 2 DIABETES MELLITUS WITH MICROALBUMINURIA, WITHOUT LONG-TERM CURRENT USE OF INSULIN: ICD-10-CM

## 2023-12-08 DIAGNOSIS — E78.2 MIXED HYPERLIPIDEMIA: ICD-10-CM

## 2023-12-08 DIAGNOSIS — Z01.818 PREOP EXAMINATION: Primary | ICD-10-CM

## 2023-12-08 DIAGNOSIS — R93.89 THICKENED ENDOMETRIUM: ICD-10-CM

## 2023-12-08 DIAGNOSIS — R80.9 TYPE 2 DIABETES MELLITUS WITH MICROALBUMINURIA, WITHOUT LONG-TERM CURRENT USE OF INSULIN: ICD-10-CM

## 2023-12-08 DIAGNOSIS — L98.9 SKIN LESION: ICD-10-CM

## 2023-12-08 PROCEDURE — 3074F SYST BP LT 130 MM HG: CPT | Performed by: FAMILY MEDICINE

## 2023-12-08 PROCEDURE — 99214 OFFICE O/P EST MOD 30 MIN: CPT | Performed by: FAMILY MEDICINE

## 2023-12-08 PROCEDURE — 3008F BODY MASS INDEX DOCD: CPT | Performed by: FAMILY MEDICINE

## 2023-12-08 PROCEDURE — 3079F DIAST BP 80-89 MM HG: CPT | Performed by: FAMILY MEDICINE

## 2023-12-08 RX ORDER — TIRZEPATIDE 10 MG/.5ML
10 INJECTION, SOLUTION SUBCUTANEOUS WEEKLY
Qty: 6 ML | Refills: 0 | Status: SHIPPED | OUTPATIENT
Start: 2023-12-08

## 2023-12-08 RX ORDER — CLOBETASOL PROPIONATE 0.5 MG/G
OINTMENT TOPICAL
Qty: 45 G | Refills: 1 | Status: SHIPPED | OUTPATIENT
Start: 2023-12-08

## 2023-12-08 RX ORDER — PRAVASTATIN SODIUM 20 MG
20 TABLET ORAL EVERY OTHER DAY
Qty: 90 TABLET | Refills: 1 | Status: SHIPPED | OUTPATIENT
Start: 2023-12-08

## 2023-12-08 RX ORDER — METOPROLOL SUCCINATE 50 MG/1
50 TABLET, EXTENDED RELEASE ORAL DAILY
Qty: 90 TABLET | Refills: 1 | Status: SHIPPED | OUTPATIENT
Start: 2023-12-08

## 2023-12-13 ENCOUNTER — TELEPHONE (OUTPATIENT)
Facility: CLINIC | Age: 68
End: 2023-12-13

## 2023-12-13 DIAGNOSIS — R94.31 ABNORMAL EKG: Primary | ICD-10-CM

## 2023-12-13 NOTE — TELEPHONE ENCOUNTER
Spoke to patient and relayed Dr. Leeanne King message below. Patient verbalized understanding. She has not checked her blood pressure/HR yet. She will find her machine and send a [employer] message. Rn provided number for central scheduling for repeat EKG. Dr. Morro Bennett, Suzanne Garcia.

## 2023-12-13 NOTE — PAT NURSING NOTE
Spoke with Aramis Brown at Dr. Twyla Lundborg' office regarding patient's abnormal EKG, stating septal. infarct. Aramis Brown states she will follow up with patient and determine if further testing/cardiac clearance needed.

## 2023-12-13 NOTE — TELEPHONE ENCOUNTER
Pt state she is scheduled for hysterectomy next Thursday. Dr. Geovanni Whelan cleared Pt for surgery however, surgeon, Dr. Bradley aVlentin is concerned of Pt's recent EKG results from 11/22/23, showing abnormal.  Pt requesting to send updated clearance to surgeon stating EKG is acceptable and no concerns of a heart attack. Please advise.         Narrative  Performed by: [facility]  Sinus tachycardia  Left axis deviation  Septal infarct , age undetermined  Abnormal ECG  No previous ECGs found in [facility]  Confirmed by Merlynn Shone ([id number]) on 11/22/2023 9:26:03 AM

## 2023-12-13 NOTE — TELEPHONE ENCOUNTER
Please have patient repeat EKG at the lab with the higher dose of Metoprolol, and also find out her recent BP and pulse readings.

## 2023-12-14 ENCOUNTER — EKG ENCOUNTER (OUTPATIENT)
Dept: LAB | Age: 68
End: 2023-12-14
Attending: FAMILY MEDICINE
Payer: COMMERCIAL

## 2023-12-14 DIAGNOSIS — R94.31 ABNORMAL EKG: ICD-10-CM

## 2023-12-14 LAB
ATRIAL RATE: 85 BPM
P AXIS: 45 DEGREES
P-R INTERVAL: 166 MS
Q-T INTERVAL: 372 MS
QRS DURATION: 80 MS
QTC CALCULATION (BEZET): 442 MS
R AXIS: -32 DEGREES
T AXIS: 56 DEGREES
VENTRICULAR RATE: 85 BPM

## 2023-12-14 PROCEDURE — 93010 ELECTROCARDIOGRAM REPORT: CPT | Performed by: STUDENT IN AN ORGANIZED HEALTH CARE EDUCATION/TRAINING PROGRAM

## 2023-12-14 PROCEDURE — 93005 ELECTROCARDIOGRAM TRACING: CPT

## 2023-12-15 NOTE — TELEPHONE ENCOUNTER
Please call Dr. Scar Villasenor' office to let them know that cardiology is requesting she come in for cardiac clearance based on EKG findings. They will try to get her in ASAP and early next week so she can still be cleared for surgery as scheduled.

## 2023-12-15 NOTE — TELEPHONE ENCOUNTER
Called patient about abnormal EKG results, but states she has no chest pain and feels very well with higher dose of Metoprolol. Did have an abnormal EKG in the past, but had a completely normal cardiac work-up. Called cardiology [facility] office to have them review EKG and left my call back number to discuss.

## 2023-12-15 NOTE — TELEPHONE ENCOUNTER
ROSA MARIA called back and since the patient has not been seen since 2017 they need her to come in for preoperative clearance. They would call her today to get her an appointment ASAP prior to her surgery if possible.

## 2023-12-18 NOTE — TELEPHONE ENCOUNTER
Dr. Rosa Peres, [facility]-- patient has cardiac testing scheduled this week and is in touch with Dr. Meka Cha office. Nothing further needed from PCP. Thank you. Umm Cortez from Dr. Meka Cha office (name and  of patient verified). Dr. Adella Olszewski message reviewed. Christian Valencia states patient called this morning. She has two cardiology tests scheduled this week,  and Wednesday. Hoping testing goes well and patient is cleared for surgery on Thursday. She states they are only awaiting cardiac clearance. Nothing further needed from PCP at this time.

## 2023-12-20 ENCOUNTER — LAB ENCOUNTER (OUTPATIENT)
Dept: LAB | Facility: HOSPITAL | Age: 68
DRG: 735 | End: 2023-12-20
Attending: OBSTETRICS & GYNECOLOGY
Payer: COMMERCIAL

## 2023-12-20 ENCOUNTER — LAB ENCOUNTER (OUTPATIENT)
Dept: LAB | Facility: HOSPITAL | Age: 68
End: 2023-12-20
Attending: OBSTETRICS & GYNECOLOGY
Payer: COMMERCIAL

## 2023-12-20 DIAGNOSIS — Z01.818 PREOP TESTING: ICD-10-CM

## 2023-12-20 LAB
ANTIBODY SCREEN: NEGATIVE
RH BLOOD TYPE: POSITIVE
RH BLOOD TYPE: POSITIVE

## 2023-12-20 PROCEDURE — 86900 BLOOD TYPING SEROLOGIC ABO: CPT

## 2023-12-20 PROCEDURE — 86850 RBC ANTIBODY SCREEN: CPT

## 2023-12-20 PROCEDURE — 86901 BLOOD TYPING SEROLOGIC RH(D): CPT

## 2023-12-20 PROCEDURE — 36415 COLL VENOUS BLD VENIPUNCTURE: CPT

## 2023-12-20 RX ORDER — CEFAZOLIN SODIUM/WATER 2 G/20 ML
2 SYRINGE (ML) INTRAVENOUS ONCE
Status: DISCONTINUED | OUTPATIENT
Start: 2023-12-20 | End: 2023-12-20

## 2023-12-21 ENCOUNTER — HOSPITAL ENCOUNTER (INPATIENT)
Facility: HOSPITAL | Age: 68
LOS: 1 days | Discharge: HOME | DRG: 735 | End: 2023-12-22
Attending: OBSTETRICS & GYNECOLOGY | Admitting: OBSTETRICS & GYNECOLOGY
Payer: COMMERCIAL

## 2023-12-21 ENCOUNTER — ANESTHESIA EVENT (OUTPATIENT)
Dept: SURGERY | Facility: HOSPITAL | Age: 68
End: 2023-12-21
Payer: COMMERCIAL

## 2023-12-21 ENCOUNTER — HOSPITAL ENCOUNTER (INPATIENT)
Facility: HOSPITAL | Age: 68
LOS: 1 days | Discharge: HOME | End: 2023-12-22
Attending: OBSTETRICS & GYNECOLOGY | Admitting: OBSTETRICS & GYNECOLOGY
Payer: COMMERCIAL

## 2023-12-21 ENCOUNTER — ANESTHESIA (OUTPATIENT)
Dept: SURGERY | Facility: HOSPITAL | Age: 68
End: 2023-12-21
Payer: COMMERCIAL

## 2023-12-21 DIAGNOSIS — Z01.818 PREOP TESTING: Primary | ICD-10-CM

## 2023-12-21 PROBLEM — I10 ESSENTIAL HYPERTENSION: Status: ACTIVE | Noted: 2017-03-29

## 2023-12-21 LAB
GLUCOSE BLDC GLUCOMTR-MCNC: 130 MG/DL (ref 70–99)
GLUCOSE BLDC GLUCOMTR-MCNC: 162 MG/DL (ref 70–99)
GLUCOSE BLDC GLUCOMTR-MCNC: 190 MG/DL (ref 70–99)
GLUCOSE BLDC GLUCOMTR-MCNC: 212 MG/DL (ref 70–99)

## 2023-12-21 PROCEDURE — 8E0W4CZ ROBOTIC ASSISTED PROCEDURE OF TRUNK REGION, PERCUTANEOUS ENDOSCOPIC APPROACH: ICD-10-PCS | Performed by: OBSTETRICS & GYNECOLOGY

## 2023-12-21 PROCEDURE — 0UT74ZZ RESECTION OF BILATERAL FALLOPIAN TUBES, PERCUTANEOUS ENDOSCOPIC APPROACH: ICD-10-PCS | Performed by: OBSTETRICS & GYNECOLOGY

## 2023-12-21 PROCEDURE — 0DBU4ZX EXCISION OF OMENTUM, PERCUTANEOUS ENDOSCOPIC APPROACH, DIAGNOSTIC: ICD-10-PCS | Performed by: OBSTETRICS & GYNECOLOGY

## 2023-12-21 PROCEDURE — 07TC4ZZ RESECTION OF PELVIS LYMPHATIC, PERCUTANEOUS ENDOSCOPIC APPROACH: ICD-10-PCS | Performed by: OBSTETRICS & GYNECOLOGY

## 2023-12-21 PROCEDURE — 99222 1ST HOSP IP/OBS MODERATE 55: CPT | Performed by: HOSPITALIST

## 2023-12-21 PROCEDURE — 0UDB7ZX EXTRACTION OF ENDOMETRIUM, VIA NATURAL OR ARTIFICIAL OPENING, DIAGNOSTIC: ICD-10-PCS | Performed by: OBSTETRICS & GYNECOLOGY

## 2023-12-21 PROCEDURE — 0UT24ZZ RESECTION OF BILATERAL OVARIES, PERCUTANEOUS ENDOSCOPIC APPROACH: ICD-10-PCS | Performed by: OBSTETRICS & GYNECOLOGY

## 2023-12-21 PROCEDURE — 0UT94ZZ RESECTION OF UTERUS, PERCUTANEOUS ENDOSCOPIC APPROACH: ICD-10-PCS | Performed by: OBSTETRICS & GYNECOLOGY

## 2023-12-21 PROCEDURE — 07BD4ZX EXCISION OF AORTIC LYMPHATIC, PERCUTANEOUS ENDOSCOPIC APPROACH, DIAGNOSTIC: ICD-10-PCS | Performed by: OBSTETRICS & GYNECOLOGY

## 2023-12-21 RX ORDER — ACETAMINOPHEN 500 MG
1000 TABLET ORAL ONCE
Status: COMPLETED | OUTPATIENT
Start: 2023-12-21 | End: 2023-12-21

## 2023-12-21 RX ORDER — BUPIVACAINE HYDROCHLORIDE 5 MG/ML
INJECTION, SOLUTION EPIDURAL; INTRACAUDAL AS NEEDED
Status: DISCONTINUED | OUTPATIENT
Start: 2023-12-21 | End: 2023-12-21 | Stop reason: HOSPADM

## 2023-12-21 RX ORDER — THYROID 90 MG/1
90 TABLET ORAL DAILY
Status: DISCONTINUED | OUTPATIENT
Start: 2023-12-22 | End: 2023-12-22

## 2023-12-21 RX ORDER — LIDOCAINE HYDROCHLORIDE 10 MG/ML
INJECTION, SOLUTION EPIDURAL; INFILTRATION; INTRACAUDAL; PERINEURAL AS NEEDED
Status: DISCONTINUED | OUTPATIENT
Start: 2023-12-21 | End: 2023-12-21 | Stop reason: SURG

## 2023-12-21 RX ORDER — MIDAZOLAM HYDROCHLORIDE 1 MG/ML
INJECTION INTRAMUSCULAR; INTRAVENOUS AS NEEDED
Status: DISCONTINUED | OUTPATIENT
Start: 2023-12-21 | End: 2023-12-21 | Stop reason: SURG

## 2023-12-21 RX ORDER — ONDANSETRON 2 MG/ML
4 INJECTION INTRAMUSCULAR; INTRAVENOUS EVERY 8 HOURS PRN
Status: DISCONTINUED | OUTPATIENT
Start: 2023-12-21 | End: 2023-12-22

## 2023-12-21 RX ORDER — ROCURONIUM BROMIDE 10 MG/ML
INJECTION, SOLUTION INTRAVENOUS AS NEEDED
Status: DISCONTINUED | OUTPATIENT
Start: 2023-12-21 | End: 2023-12-21 | Stop reason: SURG

## 2023-12-21 RX ORDER — NICOTINE POLACRILEX 4 MG
15 LOZENGE BUCCAL
Status: DISCONTINUED | OUTPATIENT
Start: 2023-12-21 | End: 2023-12-21 | Stop reason: HOSPADM

## 2023-12-21 RX ORDER — SODIUM CHLORIDE, SODIUM LACTATE, POTASSIUM CHLORIDE, CALCIUM CHLORIDE 600; 310; 30; 20 MG/100ML; MG/100ML; MG/100ML; MG/100ML
INJECTION, SOLUTION INTRAVENOUS CONTINUOUS
Status: DISCONTINUED | OUTPATIENT
Start: 2023-12-21 | End: 2023-12-22

## 2023-12-21 RX ORDER — THYROID 30 MG/1
15 TABLET ORAL DAILY
Status: DISCONTINUED | OUTPATIENT
Start: 2023-12-22 | End: 2023-12-22

## 2023-12-21 RX ORDER — METRONIDAZOLE 500 MG/100ML
500 INJECTION, SOLUTION INTRAVENOUS ONCE
Status: DISCONTINUED | OUTPATIENT
Start: 2023-12-21 | End: 2023-12-21 | Stop reason: HOSPADM

## 2023-12-21 RX ORDER — MORPHINE SULFATE 2 MG/ML
2 INJECTION, SOLUTION INTRAMUSCULAR; INTRAVENOUS EVERY 2 HOUR PRN
Status: DISCONTINUED | OUTPATIENT
Start: 2023-12-21 | End: 2023-12-22

## 2023-12-21 RX ORDER — HYDROMORPHONE HYDROCHLORIDE 1 MG/ML
0.4 INJECTION, SOLUTION INTRAMUSCULAR; INTRAVENOUS; SUBCUTANEOUS EVERY 5 MIN PRN
Status: DISCONTINUED | OUTPATIENT
Start: 2023-12-21 | End: 2023-12-21 | Stop reason: HOSPADM

## 2023-12-21 RX ORDER — NICOTINE POLACRILEX 4 MG
30 LOZENGE BUCCAL
Status: DISCONTINUED | OUTPATIENT
Start: 2023-12-21 | End: 2023-12-21 | Stop reason: HOSPADM

## 2023-12-21 RX ORDER — METRONIDAZOLE 500 MG/100ML
INJECTION, SOLUTION INTRAVENOUS AS NEEDED
Status: DISCONTINUED | OUTPATIENT
Start: 2023-12-21 | End: 2023-12-21 | Stop reason: SURG

## 2023-12-21 RX ORDER — ONDANSETRON 4 MG/1
4 TABLET, FILM COATED ORAL EVERY 8 HOURS PRN
Status: DISCONTINUED | OUTPATIENT
Start: 2023-12-21 | End: 2023-12-22

## 2023-12-21 RX ORDER — SODIUM CHLORIDE, SODIUM LACTATE, POTASSIUM CHLORIDE, CALCIUM CHLORIDE 600; 310; 30; 20 MG/100ML; MG/100ML; MG/100ML; MG/100ML
INJECTION, SOLUTION INTRAVENOUS CONTINUOUS
Status: DISCONTINUED | OUTPATIENT
Start: 2023-12-21 | End: 2023-12-21 | Stop reason: HOSPADM

## 2023-12-21 RX ORDER — METOPROLOL SUCCINATE 50 MG/1
50 TABLET, EXTENDED RELEASE ORAL DAILY
Status: DISCONTINUED | OUTPATIENT
Start: 2023-12-22 | End: 2023-12-22

## 2023-12-21 RX ORDER — INSULIN ASPART 100 [IU]/ML
3 INJECTION, SOLUTION INTRAVENOUS; SUBCUTANEOUS ONCE
Status: COMPLETED | OUTPATIENT
Start: 2023-12-21 | End: 2023-12-21

## 2023-12-21 RX ORDER — HYDROMORPHONE HYDROCHLORIDE 1 MG/ML
0.6 INJECTION, SOLUTION INTRAMUSCULAR; INTRAVENOUS; SUBCUTANEOUS EVERY 5 MIN PRN
Status: DISCONTINUED | OUTPATIENT
Start: 2023-12-21 | End: 2023-12-21 | Stop reason: HOSPADM

## 2023-12-21 RX ORDER — NALOXONE HYDROCHLORIDE 0.4 MG/ML
80 INJECTION, SOLUTION INTRAMUSCULAR; INTRAVENOUS; SUBCUTANEOUS AS NEEDED
Status: DISCONTINUED | OUTPATIENT
Start: 2023-12-21 | End: 2023-12-21 | Stop reason: HOSPADM

## 2023-12-21 RX ORDER — ENOXAPARIN SODIUM 100 MG/ML
40 INJECTION SUBCUTANEOUS DAILY
Status: DISCONTINUED | OUTPATIENT
Start: 2023-12-22 | End: 2023-12-22

## 2023-12-21 RX ORDER — MORPHINE SULFATE 10 MG/ML
6 INJECTION, SOLUTION INTRAMUSCULAR; INTRAVENOUS EVERY 10 MIN PRN
Status: DISCONTINUED | OUTPATIENT
Start: 2023-12-21 | End: 2023-12-21 | Stop reason: HOSPADM

## 2023-12-21 RX ORDER — DEXTROSE MONOHYDRATE 25 G/50ML
50 INJECTION, SOLUTION INTRAVENOUS
Status: DISCONTINUED | OUTPATIENT
Start: 2023-12-21 | End: 2023-12-21 | Stop reason: HOSPADM

## 2023-12-21 RX ORDER — CEFAZOLIN SODIUM/WATER 2 G/20 ML
2 SYRINGE (ML) INTRAVENOUS ONCE
Status: COMPLETED | OUTPATIENT
Start: 2023-12-21 | End: 2023-12-21

## 2023-12-21 RX ORDER — DOCUSATE SODIUM 100 MG/1
100 CAPSULE, LIQUID FILLED ORAL 2 TIMES DAILY PRN
Qty: 60 CAPSULE | Refills: 0 | Status: SHIPPED | OUTPATIENT
Start: 2023-12-21

## 2023-12-21 RX ORDER — HYDROCODONE BITARTRATE AND ACETAMINOPHEN 5; 325 MG/1; MG/1
1 TABLET ORAL EVERY 6 HOURS PRN
Status: DISCONTINUED | OUTPATIENT
Start: 2023-12-21 | End: 2023-12-22

## 2023-12-21 RX ORDER — SIMETHICONE 80 MG
80 TABLET,CHEWABLE ORAL 4 TIMES DAILY PRN
Status: DISCONTINUED | OUTPATIENT
Start: 2023-12-21 | End: 2023-12-22

## 2023-12-21 RX ORDER — HYDROMORPHONE HYDROCHLORIDE 1 MG/ML
0.2 INJECTION, SOLUTION INTRAMUSCULAR; INTRAVENOUS; SUBCUTANEOUS EVERY 5 MIN PRN
Status: DISCONTINUED | OUTPATIENT
Start: 2023-12-21 | End: 2023-12-21 | Stop reason: HOSPADM

## 2023-12-21 RX ORDER — DOCUSATE SODIUM 100 MG/1
100 CAPSULE, LIQUID FILLED ORAL 2 TIMES DAILY
Status: DISCONTINUED | OUTPATIENT
Start: 2023-12-22 | End: 2023-12-22

## 2023-12-21 RX ORDER — ONDANSETRON 2 MG/ML
INJECTION INTRAMUSCULAR; INTRAVENOUS AS NEEDED
Status: DISCONTINUED | OUTPATIENT
Start: 2023-12-21 | End: 2023-12-21 | Stop reason: SURG

## 2023-12-21 RX ORDER — MORPHINE SULFATE 4 MG/ML
2 INJECTION, SOLUTION INTRAMUSCULAR; INTRAVENOUS EVERY 10 MIN PRN
Status: DISCONTINUED | OUTPATIENT
Start: 2023-12-21 | End: 2023-12-21 | Stop reason: HOSPADM

## 2023-12-21 RX ORDER — HYDROCODONE BITARTRATE AND ACETAMINOPHEN 5; 325 MG/1; MG/1
1 TABLET ORAL EVERY 6 HOURS PRN
Qty: 20 TABLET | Refills: 0 | Status: SHIPPED | OUTPATIENT
Start: 2023-12-21

## 2023-12-21 RX ORDER — ONDANSETRON 2 MG/ML
INJECTION INTRAMUSCULAR; INTRAVENOUS
Status: COMPLETED
Start: 2023-12-21 | End: 2023-12-21

## 2023-12-21 RX ORDER — MORPHINE SULFATE 4 MG/ML
4 INJECTION, SOLUTION INTRAMUSCULAR; INTRAVENOUS EVERY 10 MIN PRN
Status: DISCONTINUED | OUTPATIENT
Start: 2023-12-21 | End: 2023-12-21 | Stop reason: HOSPADM

## 2023-12-21 RX ORDER — MORPHINE SULFATE 4 MG/ML
4 INJECTION, SOLUTION INTRAMUSCULAR; INTRAVENOUS EVERY 2 HOUR PRN
Status: DISCONTINUED | OUTPATIENT
Start: 2023-12-21 | End: 2023-12-22

## 2023-12-21 RX ORDER — FLUOXETINE HYDROCHLORIDE 20 MG/1
40 CAPSULE ORAL DAILY
Status: DISCONTINUED | OUTPATIENT
Start: 2023-12-22 | End: 2023-12-22

## 2023-12-21 RX ORDER — MORPHINE SULFATE 2 MG/ML
1 INJECTION, SOLUTION INTRAMUSCULAR; INTRAVENOUS EVERY 2 HOUR PRN
Status: DISCONTINUED | OUTPATIENT
Start: 2023-12-21 | End: 2023-12-22

## 2023-12-21 RX ORDER — IBUPROFEN 600 MG/1
600 TABLET ORAL EVERY 8 HOURS PRN
Qty: 60 TABLET | Refills: 0 | Status: SHIPPED | OUTPATIENT
Start: 2023-12-21

## 2023-12-21 RX ORDER — DEXAMETHASONE SODIUM PHOSPHATE 4 MG/ML
VIAL (ML) INJECTION AS NEEDED
Status: DISCONTINUED | OUTPATIENT
Start: 2023-12-21 | End: 2023-12-21 | Stop reason: SURG

## 2023-12-21 RX ADMIN — ROCURONIUM BROMIDE 10 MG: 10 INJECTION, SOLUTION INTRAVENOUS at 13:28:00

## 2023-12-21 RX ADMIN — LIDOCAINE HYDROCHLORIDE 50 MG: 10 INJECTION, SOLUTION EPIDURAL; INFILTRATION; INTRACAUDAL; PERINEURAL at 11:41:00

## 2023-12-21 RX ADMIN — DEXAMETHASONE SODIUM PHOSPHATE 4 MG: 4 MG/ML VIAL (ML) INJECTION at 11:55:00

## 2023-12-21 RX ADMIN — CEFAZOLIN SODIUM/WATER 2 G: 2 G/20 ML SYRINGE (ML) INTRAVENOUS at 11:45:00

## 2023-12-21 RX ADMIN — ROCURONIUM BROMIDE 10 MG: 10 INJECTION, SOLUTION INTRAVENOUS at 14:10:00

## 2023-12-21 RX ADMIN — MIDAZOLAM HYDROCHLORIDE 2 MG: 1 INJECTION INTRAMUSCULAR; INTRAVENOUS at 11:34:00

## 2023-12-21 RX ADMIN — ROCURONIUM BROMIDE 50 MG: 10 INJECTION, SOLUTION INTRAVENOUS at 11:41:00

## 2023-12-21 RX ADMIN — METRONIDAZOLE 500 MG: 500 INJECTION, SOLUTION INTRAVENOUS at 11:50:00

## 2023-12-21 RX ADMIN — ONDANSETRON 4 MG: 2 INJECTION INTRAMUSCULAR; INTRAVENOUS at 11:55:00

## 2023-12-21 NOTE — CONSULTS
[facility]    PATIENT'S NAME: Yolanda Quintana   ATTENDING PHYSICIAN: Balbir Red DO   CONSULTING PHYSICIAN: Td Lainez MD   PATIENT ACCOUNT#:   [id number]    LOCATION:  [facility] [address] PACU [address] [place] [address]  MEDICAL RECORD #:   [id number]       YOB: 1955  ADMISSION DATE:       2023      CONSULT DATE:  2023    REPORT OF CONSULTATION      REASON FOR ADMISSION:  Post hysterectomy. HISTORY OF PRESENT ILLNESS:  The patient is a 41-year-old  female with a recent history of recurrent postmenopausal bleed and thickened endometrium on imaging studies. Outpatient endometrial biopsy was suspicious for malignancy. Today she had the above-mentioned procedure. Postoperatively brought into PACU for further monitoring. PAST MEDICAL HISTORY:  Diabetes mellitus type 2, hyperlipidemia, hypothyroidism, nonobstructive coronary artery disease, and recent diagnosis of possible endometrial cancer. PAST SURGICAL HISTORY:  , cataract procedure, bilateral total hip arthroplasty, tonsillectomy. MEDICATIONS:  Please see medication reconciliation list.     ALLERGIES:  Questionable allergy to amoxicillin and sulfa. SOCIAL HISTORY:  No tobacco, alcohol, or drug use. Lives with her family. Independent in her basic activities of daily living. FAMILY HISTORY:  Father had hypertension and heart disease. Mother had endometrial cancer. REVIEW OF SYSTEMS:  The patient is currently resting in bed. No chest pain. Some abdominal discomfort. No shortness of breath. Other 12-point review of systems is negative. PHYSICAL EXAMINATION:    GENERAL:  Alert and oriented to time, place, and person. No acute distress. VITAL SIGNS:  Temperature 97.9, pulse 92, respiratory rate 12, blood pressure 140/94, pulse ox 94% on room air. HEENT:  Atraumatic. Oropharynx clear. Moist mucous membranes. Normal hard and soft palate. Eyes:  Anicteric sclerae. NECK:  Supple.   No lymphadenopathy. Trachea midline. Full range of motion. LUNGS:  Clear to auscultation bilaterally. Normal respiratory effort. HEART:  Regular rate and rhythm. S1 and S2 auscultated. No murmur. ABDOMEN:  Soft, nondistended. Laparoscopic incisions without complications. Hypoactive bowel sounds. EXTREMITIES:  No peripheral edema, clubbing, or cyanosis. NEUROLOGIC:  Motor and sensory intact. Cranial nerves II through XII are intact. ASSESSMENT AND PLAN:    1. Endometrial thickening with postmenopausal bleed with suspicion for endometrial cancer, status post robotic total hysterectomy, bilateral salpingo-oophorectomy, staging omentectomy, pelvic and para-aortic lymph node dissection. 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 Pain control. Deep venous thrombosis prophylaxis. Monitor hemodynamic status. Full pathology report still pending. 2.   Hypothyroidism. Continue home medications and monitor. 3.   Essential hypertension. Continue home medications.       Dictated By Haleigh Grossman MD  d: 12/21/2023 15:48:23  t: 12/21/2023 16:11:51  Job [id number]  FB/

## 2023-12-21 NOTE — OPERATIVE REPORT
[facility] OPERATING ROOM  Operative Note     [place] Arguelles Location: OR   [facility] [id number] MRN [id number]   Admission Date 12/21/2023 Operation Date 12/21/2023   Attending Physician Sushant Gibbs DO Operating Physician Kae Benavidez DO      Preoperative Diagnosis: Endometrial thickening and postmenopausal bleeding     Postoperative Diagnosis: Endometrial thickening and postmenopausal bleeding     Procedure Performed:   XI robotic total hysterectomy, bilateral salpingo oophorectomy, staging, omentectomy, Pelvic lymph node dissection       Primary Surgeon: Kae Benavidez DO      Assistant: [facility] [place]     Surgical Findings: Patient did have an endometrial biopsy in the office that came back as a benign endometrial polyp. She however continued to have postmenopausal bleeding. I therefore did recommend a hysterectomy. However at the beginning of the procedure I did perform a D&C and sent it for frozen section which came back again benign endometrial polyps. Because of this I did not inject ICG dye and made a decision to proceed with the hysterectomy and sent the uterus for frozen section. The frozen section to come back as an invasive endometrial cancer cannot rule out high-grade serous adenocarcinoma. Because of this I did end up going back and performing a pelvic and periaortic node dissection as well as an omentectomy. I did not see any evidence of disease in the peritoneum, omentum, or upper abdomen. Her omentum however was quite large, thickened and I suspect reactive because I did not see any gross implants.      Anesthesia: General     Complications: None     Implants: * No implants in log *     Specimen: Pelvic washings, endometrial curettings, uterus cervix tubes ovaries, pelvic and para-aortic node dissection, total omentectomy     Drains: None     Condition: Stable     Estimated Blood Loss: Blood Output: 75 mL (12/21/2023  2:55 PM)      Urine Output[de-identified] 300 cc    Fluids: 1.5 L     Summary of Case:  Patient was taken back to the operating room and placed in a lithotomy position. She then prepared and draped in the normal sterile fashion in dorsal lithotomy position. Next a Kenney catheter was placed under sterile conditions. Next a weighted speculum was then placed in the vagina cervix was identified grasped with a single-tooth tenaculum then sounded to approximately 7 cm dilated up to 7 mm. I did perform a D&C at this time using a sharp curettage and sent it to pathology. Next I used an 0 Prolene stitch was placed in the 12 o'clock. The V-Care was then used and applied as directed. Next attention was then turned to the abdomen where Epps's point was identified. It was confirmed that the patient had an OG tube already placed. At this time Epps's point was then injected with Marcaine transected 5 mm and the Veress needle was then used to enter into the peritoneal cavity. It was then tested and felt to be intraperitoneal and the abdomen was then insufflated with CO2 gas. At this time a 5 mm air seal was then used as a Visiport with a 5 mm laparoscope and entered into the peritoneal cavity under direct visualization. The remaining 4 ports were all placed under direct visualization 2 on each side of midline each approximately 8 mm in size. The robot was then docked and I use a fenestrated bipolar the monopolar scissors and the pro-grasp in my robotic arms. At this time I went to the console and began by getting pelvic washings. I began by making a defect in the peritoneum lateral to her IP ligament and open up the pararectal space. The round ligament was then isolated cauterized and transected with more gary in the body. The ureters were then identified and a defect was created in the medial leaf of the broad ligament between the IP ligament and the ureter. The IP ligament was then cauterized in multiple areas and then transected with more gary in the body.   Next the bladder flap was created using sharp and blunt dissection as well as traction and countertraction. The vesicouterine space was dissected all the way down to the cervicovaginal junction. At this time the uterine vessels were then cauterized at the level of the internal cervical loss in multiple areas then transected with more gary in the body. The cardinal ligaments were then grasped medial to the uterine vessels cauterized and transected all the way down to the cervicovaginal junction at which time the monopolar cautery was then used to create the colpotomy around the cervical cup with the uterus cervix tubes and ovaries pulled out through the vagina en bloc. The sentinel nodes were then removed through the vagina. The vaginal cuff was then closed using an 0-V lock suture in a running fashion then backhanded on itself all the way back for added hemostasis. At this time all the pedicles were reidentified and cauterized and felt to be hemostatic. After lowering the pressure down to 7 for 5 minutes hemostasis was assured. I did send the uterus for frozen section and made a decision to wait for the results. I did come back as a high-grade serous adenocarcinoma and therefore I proceeded to perform a full staging. I began by using the LigaSure and perform the total omentectomy off of the transverse colon using sharp and blunt dissection as well as traction countertraction. At no time did I use any energy source on or near the bowel. Once full omentectomy had been performed to place the omentum in the right paracolic gutter between attention to the pelvis. I then opened up the pararectal and paravesical spaces even more. I also opened up the obturator space. Because I did not inject ICG, I performed a full bilateral pelvic lymphadenectomy as well as periaortic sampling.   This was done from the level of the bifurcation to the level of the circumflex iliac vein laterally to the level of the genitofemoral nerve medially to the level of the ureter and the superior vesicle artery. I made sure that the ureter had been retracted medially throughout. I dissected off of the vessels using monopolar cautery as well as sharp and blunt dissection and traction countertraction. I did not use any energy source on or near the ureters nerves or blood vessels. Furthermore I was able to spare the obturator nerve bilaterally. I then proceeded to cut the stitch in the vaginal cuff and pulled out all the specimens including the pelvic and periaortic lymph nodes as well as a total omentectomy. I then reclosed the vaginal cuff using an oh V-Loc suture in a running fashion then backhanded and sucked all the way back for added hemostasis. I did place snow in all the retroperitoneal spaces including the pararectal, paravesical, obturator, and vesicovaginal spaces. All the instruments were removed the gas was allowed to escape, and the trochars were also removed. All the port sites were closed using a 4-0 Monocryl with Dermabond on the skin. This end dictation I was present scrubbed and are performed the entire procedure from skin to skin.          [address] [place],   12/21/2023  2:59 PM

## 2023-12-21 NOTE — H&P
DIAGNOSIS:  Endometrium thickened (finding)  Postmenopausal bleeding (finding)  CHIEF COMPLAINT:    Postmenopausal bleeding, abnormal endometrial thickening    HISTORY OF PRESENT ILLNESS:    Odalis Denise is a very pleasant 70year old female who presents for consultation secondary to persistent endometrial thickening and possible polyp. Patient presented to ob gyn Dr. Mica Jim on 10/13/2023 with complaints of postmenopausal bleeding, reported history of spotting a couple years ago, for which she underwent pap smear and EMB that were reportedly very painful. Patient did undergo EMB on 11/15/2021, consistent with endometritis, as well as hysteroscopy D&C on 2021, biopsy negative for endometrial tissue. Patient reported taking antibiotics, with no further directions for follow up. She continued to spot this summer. She had US which showed complex and thickened endometrium measuring 13.6 mm, with a possible polyp measuring 1.2 x 1.1 x 1.2 cm. Patient was referred to gyn onc for further management. She presents today for her initial visit. Patient overall is doing well. She does continue to have vaginal spotting. Denies nausea/vomiting/fever/chills, no abdominal or pelvic pain. No significant swelling of bilateral lower extremities reported. Bowel movements are regular, denies constipation/diarrhea. No urinary complaints. Appetite is stable. No other issues reported. Patient did report mother with history of uterine cancer, colon cancer, and liver cancer. Her brother also had colon cancer. She is UTD on colonoscopy. No history of abnormal pap smears. Patient had three c-sections.     PAST CANCER HISTORY:           PAST MEDICAL HISTORY:  Endometrium thickened (finding)  Postmenopausal bleeding (finding)  PAST SURGICAL HISTORY:    Procedure: Surgical procedure (procedure), Note: Uterine Ablation  Procedure:  section (procedure), Note: X3  Procedure: Surgical procedure (procedure), Note: Ureteroscopy    PAST OB-GYN HISTORY:    Menses Details: Age of First Menses: 14; Cycle Length: 7;  Pregnancy Details: : 4; Para: 3; #Living Children: 3;  OB/GYN Medication Hx: IUD: No; Other Contraceptive Hx: No;    PAST SOCIAL HISTORY:    (Reviewed - no changes required)  Marital:      Smoking Status: Smoking Tobacco : Never smoker; Smokeless Tobacco : none found; Vaping : none found   Alcohol Consumption: None  Substance Abuse: None   Work History:        PAST FAMILY HISTORY:    Father: ; Mother:  - Colorectal cancer, Liver cancer, Uterine cancer; Brother 1: Colorectal cancer    CURRENT MEDICATIONS:      Medication List  Name Date  Cyanocobalamin Oral 10/25/2023  Ascorbic Acid Oral 10/25/2023  Mounjaro (Tirzepatide Subcutaneous Pen Injector) 10/25/2023  Aspirin Oral 10/25/2023  Coenzyme Q10 Oral 10/25/2023  Verapamil Oral ER Tab 10/25/2023  Metoprolol Oral 24 hr Tab (Succinate) 10/25/2023  Cholecalciferol Oral 10/25/2023  Turmeric (Curcumin) Oral 10/25/2023  Fluoxetine Oral 10/25/2023  Magnesium Oral 10/25/2023  NP Thyroid (Thyroid (Pork) Oral) 10/25/2023  Probiotics Oral 10/25/2023  Zinc Sulfate Oral 10/25/2023  NP Thyroid (Thyroid (Pork) Oral) 10/25/2023  Pravastatin Sodium Oral 10/25/2023    ALLERGIES:    Sulfamide  amoxicillin    REVIEW OF SYSTEMS:    A 14 point review of systems was performed with any pertinent positives noted in the HPI and otherwise negative. VITAL SIGNS:    Blood pressure: 148/82, Sitting, L arm, Regular, Pulse: 116, Temperature: 95.7 F, Respirations: , O2 sat: 95%, At Rest, Room Air, Pain Scale: 0, Height: 67 in, Weight: 169.4 lb, BSA: 1.88, BMI: 26.53 kg/m2    PHYSICAL EXAM:    GENERAL: Alert and oriented x's 3. Well appearing female in NAD. HEENT: Pupils are equal and reactive without scleral icterus. Normal cephalic, atraumatic,   PERRLA. EOM's intact bilaterally. NECK: Trachea is midline. Supple, no LAD, no thyromegaly.     BACK: No CVA or spinous tenderness. LUNGS: CTAB, no wheezing. CARDIAC: RRR, no murmurs. ABDOMEN: Soft NTND, BS x's 4. :    External Genitalia: Normal external genitalia. Hair distribution, no lesion. Urethral Meatus: No lesions. Urethra: No masses no nodularity. Bladder: Fullness, no masses, no tenderness, no scarring. Vagina: Smooth vaginal mucosa. Cervix: Normal appearing cervix. Patient is bleeding from cervical os. EMB collected with 2 passes of the pipette. Uterus: Uterus is normal size, mobile. Adnexa: Do not appreciate any pelvic masses or nodularity. Parametria: No nodularity  RECTAL: Deferred. EXTREMITIES: No clubbing, cyanosis, or edema bilaterally. NEUROLOGIC: Cranial nerves are grossly intact bilaterally. PSYCHIATRIC: Appropriate mood and affect. ECOG:        LABS/PATHOLOGY:    Pap smear 11/15/2021: NILM/HPV negative    11/15/2021:  Endometrium; biopsy:  - Rare small superficial fragments of endometrial glands and stroma with chronic inflammation, including plasma cells, consistent with chronic endometritis. - Rare small fragments of endocervical mucosa with chronic inflammation.  - No endometrial polyps, glandular atypia, malignancy identified. 12/20/2021:  Uterus; Hysteroscopy with endometrial uterine sampling:  - Small fragment of smooth muscle and vascular tissue.  - No endometrial epithelium is identified. DIAGNOSTIC STUDIES REVIEWED:    US Pelvis 11/4/2021:  CONCLUSION:  Focal thickening of the endometrium may represent an underlying polyp or mass. Gynecologic consultation and direct visualization is recommended    US Pelvis 10/16/2023:  Comment:  Transabdominal and transvaginal images taken. The uterus is retroverted and has calcifications along border. The endometrium is complex and thickened towards right measuring 13.6 mm - possible polyp seen measuring 1.2 x 1.1 x 1.2 cm. Right ovary is not visualized.   Left ovary appears normal.     HEALTH MAINTENANCE:    Immunizations:      Screening: Colonoscopy on 02/2023; Pap Smear on 2021     ASSESSMENT AND PLAN:  Endometrium thickened: Patient presented to ob gyn Dr. Frandy Morelos on 10/13/2023 with complaints of postmenopausal bleeding, reported history of spotting a couple years ago, for which she underwent pap smear and EMB that were reportedly very painful. Patient did undergo EMB on 11/15/2021, consistent with endometritis, as well as hysteroscopy D&C on 12/20/2021, biopsy negative for endometrial tissue. Patient reported taking antibiotics, with no further directions for follow up. She continued to spot this summer. She had US which showed complex and thickened endometrium measuring 13.6 mm, with a possible polyp measuring 1.2 x 1.1 x 1.2 cm. Patient was referred to gyn onc for further management. Today, patient overall doing well. Attempted an EMB, patient only able to tolerate 2 passes of the pipette. Will follow up on results, but regardless patient desires surgical management. Therefore, reviewed my recommendations for a Robotic total hysterectomy, bilateral salpingo-oophorectomy, and possible pelvic and paraaortic lymphadenectomy. I did explain and showed anatomic pictures of the procedure and explained that I may consider sending the uterus for an intra-op frozen section and if there is deep invasion or this is a high grade malignancy, then I would do a formal lymphadenectomy which may increase her risk of having lymphedema and possibly nerve damage. I reviewed the risks of the surgery which includes but not limited to infection, bleeding, vaginal cuff dehiscence, hernia, DVT/PE, nerve damage, re-operation, bowel/bladder/ureteral injury, conversion to exlap, anesthesia complications, and permanent LE lymphedema. I did review alternatives as well as risks/benefits to the procedure. Prior to surgery, patient will undergo appropriate pre-operative testing and medical clearance prior to purposed surgery. She was able to meet with our surgical coordinator and is tentatively scheduled to undergo this procedure with myself in the near future. At the end of my visit both the patient and her family members expressed understanding and agreement with the above stated plan and all their questions were answered to their satisfaction. Postmenopausal bleeding: Plan as outlined above. Genetic counseling: Patient did report mother with history of uterine cancer, colon cancer, and liver cancer. Her brother also had colon cancer. Reviewed recommendation for genetic testing, will discuss further at follow up.

## 2023-12-21 NOTE — ANESTHESIA PROCEDURE NOTES
Peripheral IV  Date/Time: 12/21/2023 11:45 AM  Inserted by: Briana Adame CRNA    Placement  Needle size: 20 G  Laterality: left  Location: hand  Local anesthetic: none  Site prep: alcohol  Technique: anatomical landmarks  Attempts: 1

## 2023-12-22 VITALS
RESPIRATION RATE: 18 BRPM | BODY MASS INDEX: 23.55 KG/M2 | TEMPERATURE: 98 F | WEIGHT: 159 LBS | HEIGHT: 69 IN | HEART RATE: 82 BPM | OXYGEN SATURATION: 92 % | DIASTOLIC BLOOD PRESSURE: 75 MMHG | SYSTOLIC BLOOD PRESSURE: 127 MMHG

## 2023-12-22 LAB
ANION GAP SERPL CALC-SCNC: 8 MMOL/L (ref 0–18)
BASOPHILS # BLD AUTO: 0.01 X10(3) UL (ref 0–0.2)
BASOPHILS NFR BLD AUTO: 0.1 %
BUN BLD-MCNC: 12 MG/DL (ref 9–23)
BUN/CREAT SERPL: 26.1 (ref 10–20)
CALCIUM BLD-MCNC: 9.1 MG/DL (ref 8.7–10.4)
CHLORIDE SERPL-SCNC: 106 MMOL/L (ref 98–112)
CO2 SERPL-SCNC: 26 MMOL/L (ref 21–32)
CREAT BLD-MCNC: 0.46 MG/DL
DEPRECATED RDW RBC AUTO: 43.5 FL (ref 35.1–46.3)
EGFRCR SERPLBLD CKD-EPI 2021: 104 ML/MIN/1.73M2 (ref 60–?)
EOSINOPHIL # BLD AUTO: 0.01 X10(3) UL (ref 0–0.7)
EOSINOPHIL NFR BLD AUTO: 0.1 %
ERYTHROCYTE [DISTWIDTH] IN BLOOD BY AUTOMATED COUNT: 13.3 % (ref 11–15)
GLUCOSE BLD-MCNC: 105 MG/DL (ref 70–99)
GLUCOSE BLDC GLUCOMTR-MCNC: 102 MG/DL (ref 70–99)
GLUCOSE BLDC GLUCOMTR-MCNC: 97 MG/DL (ref 70–99)
HCT VFR BLD AUTO: 38 %
HGB BLD-MCNC: 13.2 G/DL
IMM GRANULOCYTES # BLD AUTO: 0.05 X10(3) UL (ref 0–1)
IMM GRANULOCYTES NFR BLD: 0.4 %
LYMPHOCYTES # BLD AUTO: 1.97 X10(3) UL (ref 1–4)
LYMPHOCYTES NFR BLD AUTO: 16.6 %
MCH RBC QN AUTO: 30.8 PG (ref 26–34)
MCHC RBC AUTO-ENTMCNC: 34.7 G/DL (ref 31–37)
MCV RBC AUTO: 88.8 FL
MONOCYTES # BLD AUTO: 1.05 X10(3) UL (ref 0.1–1)
MONOCYTES NFR BLD AUTO: 8.8 %
NEUTROPHILS # BLD AUTO: 8.8 X10 (3) UL (ref 1.5–7.7)
NEUTROPHILS # BLD AUTO: 8.8 X10(3) UL (ref 1.5–7.7)
NEUTROPHILS NFR BLD AUTO: 74 %
OSMOLALITY SERPL CALC.SUM OF ELEC: 290 MOSM/KG (ref 275–295)
PLATELET # BLD AUTO: 254 10(3)UL (ref 150–450)
POTASSIUM SERPL-SCNC: 3.6 MMOL/L (ref 3.5–5.1)
RBC # BLD AUTO: 4.28 X10(6)UL
SODIUM SERPL-SCNC: 140 MMOL/L (ref 136–145)
WBC # BLD AUTO: 11.9 X10(3) UL (ref 4–11)

## 2023-12-22 PROCEDURE — 99239 HOSP IP/OBS DSCHRG MGMT >30: CPT | Performed by: HOSPITALIST

## 2023-12-22 NOTE — PLAN OF CARE
Patient alert and oriented. X5 lap sites to abdomen. Norco provided for pain as needed. Voiding freely. Active bowel sounds. Tolerating soft diet. Clear for discharge. Script sent with patient. Discharge instructions provided.     Problem: Patient Centered Care  Goal: Patient preferences are identified and integrated in the patient's plan of care  Description: Interventions:  - What would you like us to know as we care for you?   - Provide timely, complete, and accurate information to patient/family  - Incorporate patient and family knowledge, values, beliefs, and cultural backgrounds into the planning and delivery of care  - Encourage patient/family to participate in care and decision-making at the level they choose  - Honor patient and family perspectives and choices  Outcome: Adequate for Discharge     Problem: Diabetes/Glucose Control  Goal: Glucose maintained within prescribed range  Description: INTERVENTIONS:  - Monitor Blood Glucose as ordered  - Assess for signs and symptoms of hyperglycemia and hypoglycemia  - Administer ordered medications to maintain glucose within target range  - Assess barriers to adequate nutritional intake and initiate nutrition consult as needed  - Instruct patient on self management of diabetes  Outcome: Adequate for Discharge     Problem: Patient/Family Goals  Goal: Patient/Family Long Term Goal  Description: Patient's Long Term Goal: to discharge    Interventions:  - follow md orders  -monitor labs  -discharge planning  -update pt and family on plan of care   - See additional Care Plan goals for specific interventions  Outcome: Adequate for Discharge  Goal: Patient/Family Short Term Goal  Description: Patient's Short Term Goal: pain management    Interventions:   - follow md orders  -take pain medication as needed  -non-pharmacologic therapy  -adequate diet  -  - See additional Care Plan goals for specific interventions  Outcome: Adequate for Discharge

## 2023-12-22 NOTE — PLAN OF CARE
Problem: Patient Centered Care  Goal: Patient preferences are identified and integrated in the patient's plan of care  Description: Interventions:  - What would you like us to know as we care for you?   - Provide timely, complete, and accurate information to patient/family  - Incorporate patient and family knowledge, values, beliefs, and cultural backgrounds into the planning and delivery of care  - Encourage patient/family to participate in care and decision-making at the level they choose  - Honor patient and family perspectives and choices  Outcome: Progressing     Problem: Diabetes/Glucose Control  Goal: Glucose maintained within prescribed range  Description: INTERVENTIONS:  - Monitor Blood Glucose as ordered  - Assess for signs and symptoms of hyperglycemia and hypoglycemia  - Administer ordered medications to maintain glucose within target range  - Assess barriers to adequate nutritional intake and initiate nutrition consult as needed  - Instruct patient on self management of diabetes  Outcome: Progressing     Problem: Patient/Family Goals  Goal: Patient/Family Long Term Goal  Description: Patient's Long Term Goal:     Interventions:  -   - See additional Care Plan goals for specific interventions  Outcome: Progressing  Goal: Patient/Family Short Term Goal  Description: Patient's Short Term Goal:     Interventions:   -   - See additional Care Plan goals for specific interventions  Outcome: Kimberly Christensen is aware of her plan of care, daughter at bedside overnight. Patient is alert and oriented x4, room air. Pain controlled with PRN Morphine and PRN [place]. Surgical sites clean, dry and intact. Tolerating diet. Voids WNL. Up standby. Safety measures in place, call light within reach, will continue to monitor.

## 2023-12-26 ENCOUNTER — PATIENT OUTREACH (OUTPATIENT)
Dept: CASE MANAGEMENT | Age: 68
End: 2023-12-26

## 2023-12-26 NOTE — PROGRESS NOTES
DM apt request (discharged 12/22)    Dr Palomo Plilai  [address] [place] [id number] [phone]  Pt declined apt, advised she is seeing her surgeon on Wed  Closing encounter

## 2024-01-02 ENCOUNTER — MED REC SCAN ONLY (OUTPATIENT)
Facility: CLINIC | Age: 69
End: 2024-01-02

## 2024-01-03 ENCOUNTER — HOSPITAL ENCOUNTER (OUTPATIENT)
Dept: CT IMAGING | Age: 69
Discharge: HOME | End: 2024-01-03
Attending: OBSTETRICS & GYNECOLOGY
Payer: COMMERCIAL

## 2024-01-03 DIAGNOSIS — R93.89 THICKENED ENDOMETRIUM: ICD-10-CM

## 2024-01-03 DIAGNOSIS — N95.0 POSTMENOPAUSAL BLEEDING: ICD-10-CM

## 2024-01-03 PROCEDURE — 74177 CT ABD & PELVIS W/CONTRAST: CPT | Performed by: OBSTETRICS & GYNECOLOGY

## 2024-01-03 PROCEDURE — 71260 CT THORAX DX C+: CPT | Performed by: OBSTETRICS & GYNECOLOGY

## 2024-01-05 ENCOUNTER — LAB ENCOUNTER (OUTPATIENT)
Dept: LAB | Age: 69
End: 2024-01-05
Attending: FAMILY MEDICINE
Payer: COMMERCIAL

## 2024-01-05 DIAGNOSIS — R80.9 TYPE 2 DIABETES MELLITUS WITH MICROALBUMINURIA, WITHOUT LONG-TERM CURRENT USE OF INSULIN  (HCC): ICD-10-CM

## 2024-01-05 DIAGNOSIS — E11.29 TYPE 2 DIABETES MELLITUS WITH MICROALBUMINURIA, WITHOUT LONG-TERM CURRENT USE OF INSULIN  (HCC): ICD-10-CM

## 2024-01-05 DIAGNOSIS — R93.89 THICKENED ENDOMETRIUM: Primary | ICD-10-CM

## 2024-01-05 DIAGNOSIS — E78.2 MIXED HYPERLIPIDEMIA: ICD-10-CM

## 2024-01-05 DIAGNOSIS — N95.0 POSTMENOPAUSAL BLEEDING: ICD-10-CM

## 2024-01-05 LAB
ALBUMIN SERPL-MCNC: 4.4 G/DL (ref 3.2–4.8)
ALBUMIN/GLOB SERPL: 1.5 {RATIO} (ref 1–2)
ALP LIVER SERPL-CCNC: 118 U/L
ALT SERPL-CCNC: 97 U/L
ANION GAP SERPL CALC-SCNC: 7 MMOL/L (ref 0–18)
AST SERPL-CCNC: 70 U/L (ref ?–34)
BASOPHILS # BLD AUTO: 0.03 X10(3) UL (ref 0–0.2)
BASOPHILS NFR BLD AUTO: 0.4 %
BILIRUB SERPL-MCNC: 0.7 MG/DL (ref 0.2–1.1)
BUN BLD-MCNC: 19 MG/DL (ref 9–23)
BUN/CREAT SERPL: 30.6 (ref 10–20)
CALCIUM BLD-MCNC: 9.6 MG/DL (ref 8.7–10.4)
CANCER AG125 SERPL-ACNC: 39 U/ML (ref ?–30.2)
CANCER AG19-9 SERPL-ACNC: 9.2 U/ML (ref ?–35)
CEA SERPL-MCNC: 2.6 NG/ML (ref ?–5)
CHLORIDE SERPL-SCNC: 107 MMOL/L (ref 98–112)
CHOLEST SERPL-MCNC: 180 MG/DL (ref ?–200)
CO2 SERPL-SCNC: 25 MMOL/L (ref 21–32)
CREAT BLD-MCNC: 0.62 MG/DL
DEPRECATED RDW RBC AUTO: 47 FL (ref 35.1–46.3)
EGFRCR SERPLBLD CKD-EPI 2021: 96 ML/MIN/1.73M2 (ref 60–?)
EOSINOPHIL # BLD AUTO: 0.32 X10(3) UL (ref 0–0.7)
EOSINOPHIL NFR BLD AUTO: 3.9 %
ERYTHROCYTE [DISTWIDTH] IN BLOOD BY AUTOMATED COUNT: 14.1 % (ref 11–15)
EST. AVERAGE GLUCOSE BLD GHB EST-MCNC: 151 MG/DL (ref 68–126)
FASTING PATIENT LIPID ANSWER: NO
FASTING STATUS PATIENT QL REPORTED: NO
GLOBULIN PLAS-MCNC: 3 G/DL (ref 2.8–4.4)
GLUCOSE BLD-MCNC: 133 MG/DL (ref 70–99)
HBA1C MFR BLD: 6.9 % (ref ?–5.7)
HCT VFR BLD AUTO: 43.5 %
HDLC SERPL-MCNC: 45 MG/DL (ref 40–59)
HGB BLD-MCNC: 13.9 G/DL
IMM GRANULOCYTES # BLD AUTO: 0.02 X10(3) UL (ref 0–1)
IMM GRANULOCYTES NFR BLD: 0.2 %
LDLC SERPL CALC-MCNC: 109 MG/DL (ref ?–100)
LYMPHOCYTES # BLD AUTO: 1.95 X10(3) UL (ref 1–4)
LYMPHOCYTES NFR BLD AUTO: 23.5 %
MAGNESIUM SERPL-MCNC: 2 MG/DL (ref 1.6–2.6)
MCH RBC QN AUTO: 29 PG (ref 26–34)
MCHC RBC AUTO-ENTMCNC: 32 G/DL (ref 31–37)
MCV RBC AUTO: 90.8 FL
MONOCYTES # BLD AUTO: 0.57 X10(3) UL (ref 0.1–1)
MONOCYTES NFR BLD AUTO: 6.9 %
NEUTROPHILS # BLD AUTO: 5.41 X10 (3) UL (ref 1.5–7.7)
NEUTROPHILS # BLD AUTO: 5.41 X10(3) UL (ref 1.5–7.7)
NEUTROPHILS NFR BLD AUTO: 65.1 %
NONHDLC SERPL-MCNC: 135 MG/DL (ref ?–130)
OSMOLALITY SERPL CALC.SUM OF ELEC: 292 MOSM/KG (ref 275–295)
PHOSPHATE SERPL-MCNC: 4.2 MG/DL (ref 2.4–5.1)
PLATELET # BLD AUTO: 316 10(3)UL (ref 150–450)
POTASSIUM SERPL-SCNC: 4.2 MMOL/L (ref 3.5–5.1)
PROT SERPL-MCNC: 7.4 G/DL (ref 5.7–8.2)
RBC # BLD AUTO: 4.79 X10(6)UL
SODIUM SERPL-SCNC: 139 MMOL/L (ref 136–145)
TRIGL SERPL-MCNC: 145 MG/DL (ref 30–149)
VLDLC SERPL CALC-MCNC: 25 MG/DL (ref 0–30)
WBC # BLD AUTO: 8.3 X10(3) UL (ref 4–11)

## 2024-01-05 PROCEDURE — 36415 COLL VENOUS BLD VENIPUNCTURE: CPT

## 2024-01-05 PROCEDURE — 80053 COMPREHEN METABOLIC PANEL: CPT

## 2024-01-05 PROCEDURE — 83735 ASSAY OF MAGNESIUM: CPT

## 2024-01-05 PROCEDURE — 85025 COMPLETE CBC W/AUTO DIFF WBC: CPT

## 2024-01-05 PROCEDURE — 80061 LIPID PANEL: CPT

## 2024-01-05 PROCEDURE — 83036 HEMOGLOBIN GLYCOSYLATED A1C: CPT

## 2024-01-05 PROCEDURE — 86304 IMMUNOASSAY TUMOR CA 125: CPT

## 2024-01-05 PROCEDURE — 84100 ASSAY OF PHOSPHORUS: CPT

## 2024-01-05 PROCEDURE — 82378 CARCINOEMBRYONIC ANTIGEN: CPT

## 2024-01-05 PROCEDURE — 86301 IMMUNOASSAY TUMOR CA 19-9: CPT

## 2024-01-07 PROBLEM — Z01.818 PREOP TESTING: Status: RESOLVED | Noted: 2023-12-21 | Resolved: 2024-01-07

## 2024-01-07 PROBLEM — I25.10 ARTERIOSCLEROSIS OF CORONARY ARTERY: Status: ACTIVE | Noted: 2023-12-18

## 2024-02-09 ENCOUNTER — LAB ENCOUNTER (OUTPATIENT)
Dept: LAB | Age: 69
End: 2024-02-09
Attending: OBSTETRICS & GYNECOLOGY
Payer: COMMERCIAL

## 2024-02-09 DIAGNOSIS — R93.89 THICKENED ENDOMETRIUM: ICD-10-CM

## 2024-02-09 DIAGNOSIS — N95.0 POSTMENOPAUSAL BLEEDING: ICD-10-CM

## 2024-02-09 LAB
ALBUMIN SERPL-MCNC: 4.6 G/DL (ref 3.2–4.8)
ALBUMIN/GLOB SERPL: 1.6 {RATIO} (ref 1–2)
ALP LIVER SERPL-CCNC: 136 U/L
ALT SERPL-CCNC: 67 U/L
ANION GAP SERPL CALC-SCNC: 9 MMOL/L (ref 0–18)
AST SERPL-CCNC: 39 U/L (ref ?–34)
BASOPHILS # BLD AUTO: 0.03 X10(3) UL (ref 0–0.2)
BASOPHILS NFR BLD AUTO: 0.4 %
BILIRUB SERPL-MCNC: 0.4 MG/DL (ref 0.2–1.1)
BUN BLD-MCNC: 25 MG/DL (ref 9–23)
BUN/CREAT SERPL: 37.9 (ref 10–20)
CALCIUM BLD-MCNC: 9.9 MG/DL (ref 8.7–10.4)
CANCER AG125 SERPL-ACNC: 36 U/ML (ref ?–30.2)
CANCER AG19-9 SERPL-ACNC: <2 U/ML (ref ?–35)
CEA SERPL-MCNC: 2.5 NG/ML (ref ?–5)
CHLORIDE SERPL-SCNC: 108 MMOL/L (ref 98–112)
CO2 SERPL-SCNC: 25 MMOL/L (ref 21–32)
CREAT BLD-MCNC: 0.66 MG/DL
DEPRECATED RDW RBC AUTO: 47.7 FL (ref 35.1–46.3)
EGFRCR SERPLBLD CKD-EPI 2021: 95 ML/MIN/1.73M2 (ref 60–?)
EOSINOPHIL # BLD AUTO: 0.19 X10(3) UL (ref 0–0.7)
EOSINOPHIL NFR BLD AUTO: 2.4 %
ERYTHROCYTE [DISTWIDTH] IN BLOOD BY AUTOMATED COUNT: 15 % (ref 11–15)
FASTING STATUS PATIENT QL REPORTED: NO
GLOBULIN PLAS-MCNC: 2.9 G/DL (ref 2.8–4.4)
GLUCOSE BLD-MCNC: 116 MG/DL (ref 70–99)
HCT VFR BLD AUTO: 42.8 %
HGB BLD-MCNC: 13.7 G/DL
IMM GRANULOCYTES # BLD AUTO: 0.03 X10(3) UL (ref 0–1)
IMM GRANULOCYTES NFR BLD: 0.4 %
LYMPHOCYTES # BLD AUTO: 3.35 X10(3) UL (ref 1–4)
LYMPHOCYTES NFR BLD AUTO: 41.5 %
MAGNESIUM SERPL-MCNC: 2.2 MG/DL (ref 1.6–2.6)
MCH RBC QN AUTO: 29.2 PG (ref 26–34)
MCHC RBC AUTO-ENTMCNC: 32 G/DL (ref 31–37)
MCV RBC AUTO: 91.3 FL
MONOCYTES # BLD AUTO: 0.74 X10(3) UL (ref 0.1–1)
MONOCYTES NFR BLD AUTO: 9.2 %
NEUTROPHILS # BLD AUTO: 3.73 X10 (3) UL (ref 1.5–7.7)
NEUTROPHILS # BLD AUTO: 3.73 X10(3) UL (ref 1.5–7.7)
NEUTROPHILS NFR BLD AUTO: 46.1 %
OSMOLALITY SERPL CALC.SUM OF ELEC: 299 MOSM/KG (ref 275–295)
PHOSPHATE SERPL-MCNC: 3.7 MG/DL (ref 2.4–5.1)
PLATELET # BLD AUTO: 342 10(3)UL (ref 150–450)
POTASSIUM SERPL-SCNC: 4.4 MMOL/L (ref 3.5–5.1)
PROT SERPL-MCNC: 7.5 G/DL (ref 5.7–8.2)
RBC # BLD AUTO: 4.69 X10(6)UL
SODIUM SERPL-SCNC: 142 MMOL/L (ref 136–145)
WBC # BLD AUTO: 8.1 X10(3) UL (ref 4–11)

## 2024-02-09 PROCEDURE — 36415 COLL VENOUS BLD VENIPUNCTURE: CPT

## 2024-02-09 PROCEDURE — 85025 COMPLETE CBC W/AUTO DIFF WBC: CPT

## 2024-02-09 PROCEDURE — 84100 ASSAY OF PHOSPHORUS: CPT

## 2024-02-09 PROCEDURE — 80053 COMPREHEN METABOLIC PANEL: CPT

## 2024-02-09 PROCEDURE — 86304 IMMUNOASSAY TUMOR CA 125: CPT

## 2024-02-09 PROCEDURE — 86301 IMMUNOASSAY TUMOR CA 19-9: CPT

## 2024-02-09 PROCEDURE — 82378 CARCINOEMBRYONIC ANTIGEN: CPT

## 2024-02-09 PROCEDURE — 83735 ASSAY OF MAGNESIUM: CPT

## 2024-03-01 ENCOUNTER — LAB ENCOUNTER (OUTPATIENT)
Dept: LAB | Age: 69
End: 2024-03-01
Attending: OBSTETRICS & GYNECOLOGY
Payer: COMMERCIAL

## 2024-03-01 DIAGNOSIS — R93.89 THICKENED ENDOMETRIUM: ICD-10-CM

## 2024-03-01 DIAGNOSIS — N95.0 POSTMENOPAUSAL BLEEDING: ICD-10-CM

## 2024-03-01 LAB
ALBUMIN SERPL-MCNC: 4.9 G/DL (ref 3.2–4.8)
ALBUMIN/GLOB SERPL: 1.6 {RATIO} (ref 1–2)
ALP LIVER SERPL-CCNC: 139 U/L
ALT SERPL-CCNC: 70 U/L
ANION GAP SERPL CALC-SCNC: 5 MMOL/L (ref 0–18)
AST SERPL-CCNC: 48 U/L (ref ?–34)
BASOPHILS # BLD AUTO: 0.05 X10(3) UL (ref 0–0.2)
BASOPHILS NFR BLD AUTO: 0.6 %
BILIRUB SERPL-MCNC: 0.5 MG/DL (ref 0.2–1.1)
BUN BLD-MCNC: 19 MG/DL (ref 9–23)
BUN/CREAT SERPL: 33.3 (ref 10–20)
CALCIUM BLD-MCNC: 10 MG/DL (ref 8.7–10.4)
CANCER AG125 SERPL-ACNC: 28 U/ML (ref ?–30.2)
CANCER AG19-9 SERPL-ACNC: 4.2 U/ML (ref ?–35)
CEA SERPL-MCNC: 3.9 NG/ML (ref ?–5)
CHLORIDE SERPL-SCNC: 109 MMOL/L (ref 98–112)
CO2 SERPL-SCNC: 24 MMOL/L (ref 21–32)
CREAT BLD-MCNC: 0.57 MG/DL
DEPRECATED RDW RBC AUTO: 51.6 FL (ref 35.1–46.3)
EGFRCR SERPLBLD CKD-EPI 2021: 98 ML/MIN/1.73M2 (ref 60–?)
EOSINOPHIL # BLD AUTO: 0.18 X10(3) UL (ref 0–0.7)
EOSINOPHIL NFR BLD AUTO: 2.1 %
ERYTHROCYTE [DISTWIDTH] IN BLOOD BY AUTOMATED COUNT: 15.7 % (ref 11–15)
FASTING STATUS PATIENT QL REPORTED: NO
GLOBULIN PLAS-MCNC: 3 G/DL (ref 2.8–4.4)
GLUCOSE BLD-MCNC: 99 MG/DL (ref 70–99)
HCT VFR BLD AUTO: 43.4 %
HGB BLD-MCNC: 14 G/DL
IMM GRANULOCYTES # BLD AUTO: 0.03 X10(3) UL (ref 0–1)
IMM GRANULOCYTES NFR BLD: 0.3 %
LYMPHOCYTES # BLD AUTO: 3.6 X10(3) UL (ref 1–4)
LYMPHOCYTES NFR BLD AUTO: 41.9 %
MAGNESIUM SERPL-MCNC: 2 MG/DL (ref 1.6–2.6)
MCH RBC QN AUTO: 29.7 PG (ref 26–34)
MCHC RBC AUTO-ENTMCNC: 32.3 G/DL (ref 31–37)
MCV RBC AUTO: 91.9 FL
MONOCYTES # BLD AUTO: 0.68 X10(3) UL (ref 0.1–1)
MONOCYTES NFR BLD AUTO: 7.9 %
NEUTROPHILS # BLD AUTO: 4.06 X10 (3) UL (ref 1.5–7.7)
NEUTROPHILS # BLD AUTO: 4.06 X10(3) UL (ref 1.5–7.7)
NEUTROPHILS NFR BLD AUTO: 47.2 %
OSMOLALITY SERPL CALC.SUM OF ELEC: 288 MOSM/KG (ref 275–295)
PHOSPHATE SERPL-MCNC: 3.5 MG/DL (ref 2.4–5.1)
PLATELET # BLD AUTO: 335 10(3)UL (ref 150–450)
POTASSIUM SERPL-SCNC: 4 MMOL/L (ref 3.5–5.1)
PROT SERPL-MCNC: 7.9 G/DL (ref 5.7–8.2)
RBC # BLD AUTO: 4.72 X10(6)UL
SODIUM SERPL-SCNC: 138 MMOL/L (ref 136–145)
WBC # BLD AUTO: 8.6 X10(3) UL (ref 4–11)

## 2024-03-01 PROCEDURE — 82378 CARCINOEMBRYONIC ANTIGEN: CPT

## 2024-03-01 PROCEDURE — 83735 ASSAY OF MAGNESIUM: CPT

## 2024-03-01 PROCEDURE — 80053 COMPREHEN METABOLIC PANEL: CPT

## 2024-03-01 PROCEDURE — 84100 ASSAY OF PHOSPHORUS: CPT

## 2024-03-01 PROCEDURE — 36415 COLL VENOUS BLD VENIPUNCTURE: CPT

## 2024-03-01 PROCEDURE — 86304 IMMUNOASSAY TUMOR CA 125: CPT

## 2024-03-01 PROCEDURE — 85025 COMPLETE CBC W/AUTO DIFF WBC: CPT

## 2024-03-01 PROCEDURE — 86301 IMMUNOASSAY TUMOR CA 19-9: CPT

## 2024-03-04 ENCOUNTER — LAB REQUISITION (OUTPATIENT)
Dept: LAB | Facility: HOSPITAL | Age: 69
End: 2024-03-04
Payer: COMMERCIAL

## 2024-03-04 ENCOUNTER — LAB REQUISITION (OUTPATIENT)
Dept: LAB | Age: 69
End: 2024-03-04

## 2024-03-04 DIAGNOSIS — C54.1 MALIGNANT NEOPLASM OF ENDOMETRIUM (HCC): ICD-10-CM

## 2024-03-04 DIAGNOSIS — E03.9 HYPOTHYROIDISM, UNSPECIFIED: ICD-10-CM

## 2024-03-04 DIAGNOSIS — C55 MALIGNANT NEOPLASM OF UTERUS, PART UNSPECIFIED  (CMD): ICD-10-CM

## 2024-03-04 PROCEDURE — 84443 ASSAY THYROID STIM HORMONE: CPT | Performed by: CLINICAL MEDICAL LABORATORY

## 2024-03-04 PROCEDURE — 88360 TUMOR IMMUNOHISTOCHEM/MANUAL: CPT | Performed by: PATHOLOGY

## 2024-03-04 PROCEDURE — 88363 XM ARCHIVE TISSUE MOLEC ANAL: CPT | Performed by: PATHOLOGY

## 2024-03-05 LAB — TSH SERPL-ACNC: 4.72 MCUNITS/ML (ref 0.35–5)

## 2024-03-07 ENCOUNTER — TELEPHONE (OUTPATIENT)
Facility: CLINIC | Age: 69
End: 2024-03-07

## 2024-03-11 NOTE — TELEPHONE ENCOUNTER
Prior Authorization approved 02/10/2024-03/10/2025    Approved   3/11/2024 11:00 AM  Case ID: [id number] Appeal supported: No   Note from payer: The case has been Approved from  [id number]   Payer: [employer] [facility]    [phone]    [phone]       Patient informed via [employer]

## 2024-03-22 ENCOUNTER — LAB ENCOUNTER (OUTPATIENT)
Dept: LAB | Age: 69
End: 2024-03-22
Attending: OBSTETRICS & GYNECOLOGY
Payer: COMMERCIAL

## 2024-03-22 DIAGNOSIS — N95.0 POSTMENOPAUSAL BLEEDING: ICD-10-CM

## 2024-03-22 DIAGNOSIS — R93.89 THICKENED ENDOMETRIUM: ICD-10-CM

## 2024-03-22 LAB
ALBUMIN SERPL-MCNC: 4.5 G/DL (ref 3.2–4.8)
ALBUMIN/GLOB SERPL: 1.6 {RATIO} (ref 1–2)
ALP LIVER SERPL-CCNC: 126 U/L
ALT SERPL-CCNC: 40 U/L
ANION GAP SERPL CALC-SCNC: 8 MMOL/L (ref 0–18)
AST SERPL-CCNC: 26 U/L (ref ?–34)
BASOPHILS # BLD AUTO: 0.03 X10(3) UL (ref 0–0.2)
BASOPHILS NFR BLD AUTO: 0.5 %
BILIRUB SERPL-MCNC: 0.5 MG/DL (ref 0.2–1.1)
BUN BLD-MCNC: 22 MG/DL (ref 9–23)
BUN/CREAT SERPL: 35.5 (ref 10–20)
CALCIUM BLD-MCNC: 9.9 MG/DL (ref 8.7–10.4)
CANCER AG125 SERPL-ACNC: 23 U/ML (ref ?–30.2)
CANCER AG19-9 SERPL-ACNC: 8.2 U/ML (ref ?–35)
CEA SERPL-MCNC: 3.2 NG/ML (ref ?–5)
CHLORIDE SERPL-SCNC: 108 MMOL/L (ref 98–112)
CO2 SERPL-SCNC: 23 MMOL/L (ref 21–32)
CREAT BLD-MCNC: 0.62 MG/DL
DEPRECATED RDW RBC AUTO: 54.8 FL (ref 35.1–46.3)
EGFRCR SERPLBLD CKD-EPI 2021: 96 ML/MIN/1.73M2 (ref 60–?)
EOSINOPHIL # BLD AUTO: 0.07 X10(3) UL (ref 0–0.7)
EOSINOPHIL NFR BLD AUTO: 1.2 %
ERYTHROCYTE [DISTWIDTH] IN BLOOD BY AUTOMATED COUNT: 16.4 % (ref 11–15)
FASTING STATUS PATIENT QL REPORTED: NO
GLOBULIN PLAS-MCNC: 2.9 G/DL (ref 2.8–4.4)
GLUCOSE BLD-MCNC: 181 MG/DL (ref 70–99)
HCT VFR BLD AUTO: 38.7 %
HGB BLD-MCNC: 13.2 G/DL
IMM GRANULOCYTES # BLD AUTO: 0.01 X10(3) UL (ref 0–1)
IMM GRANULOCYTES NFR BLD: 0.2 %
LYMPHOCYTES # BLD AUTO: 1.56 X10(3) UL (ref 1–4)
LYMPHOCYTES NFR BLD AUTO: 26.5 %
MAGNESIUM SERPL-MCNC: 1.8 MG/DL (ref 1.6–2.6)
MCH RBC QN AUTO: 31.5 PG (ref 26–34)
MCHC RBC AUTO-ENTMCNC: 34.1 G/DL (ref 31–37)
MCV RBC AUTO: 92.4 FL
MONOCYTES # BLD AUTO: 0.8 X10(3) UL (ref 0.1–1)
MONOCYTES NFR BLD AUTO: 13.6 %
NEUTROPHILS # BLD AUTO: 3.41 X10 (3) UL (ref 1.5–7.7)
NEUTROPHILS # BLD AUTO: 3.41 X10(3) UL (ref 1.5–7.7)
NEUTROPHILS NFR BLD AUTO: 58 %
OSMOLALITY SERPL CALC.SUM OF ELEC: 296 MOSM/KG (ref 275–295)
PHOSPHATE SERPL-MCNC: 3.6 MG/DL (ref 2.4–5.1)
PLATELET # BLD AUTO: 232 10(3)UL (ref 150–450)
POTASSIUM SERPL-SCNC: 4.3 MMOL/L (ref 3.5–5.1)
PROT SERPL-MCNC: 7.4 G/DL (ref 5.7–8.2)
RBC # BLD AUTO: 4.19 X10(6)UL
SODIUM SERPL-SCNC: 139 MMOL/L (ref 136–145)
WBC # BLD AUTO: 5.9 X10(3) UL (ref 4–11)

## 2024-03-22 PROCEDURE — 86301 IMMUNOASSAY TUMOR CA 19-9: CPT

## 2024-03-22 PROCEDURE — 83735 ASSAY OF MAGNESIUM: CPT

## 2024-03-22 PROCEDURE — 85025 COMPLETE CBC W/AUTO DIFF WBC: CPT

## 2024-03-22 PROCEDURE — 86304 IMMUNOASSAY TUMOR CA 125: CPT

## 2024-03-22 PROCEDURE — 36415 COLL VENOUS BLD VENIPUNCTURE: CPT

## 2024-03-22 PROCEDURE — 80053 COMPREHEN METABOLIC PANEL: CPT

## 2024-03-22 PROCEDURE — 82378 CARCINOEMBRYONIC ANTIGEN: CPT

## 2024-03-22 PROCEDURE — 84100 ASSAY OF PHOSPHORUS: CPT

## 2024-03-25 ENCOUNTER — LAB REQUISITION (OUTPATIENT)
Dept: LAB | Age: 69
End: 2024-03-25

## 2024-03-25 DIAGNOSIS — C55 MALIGNANT NEOPLASM OF UTERUS, PART UNSPECIFIED (CMD): ICD-10-CM

## 2024-03-25 DIAGNOSIS — E03.9 HYPOTHYROIDISM, UNSPECIFIED: ICD-10-CM

## 2024-03-25 PROCEDURE — 84443 ASSAY THYROID STIM HORMONE: CPT | Performed by: CLINICAL MEDICAL LABORATORY

## 2024-03-26 LAB — TSH SERPL-ACNC: 8.17 MCUNITS/ML (ref 0.35–5)

## 2024-04-12 ENCOUNTER — LAB ENCOUNTER (OUTPATIENT)
Dept: LAB | Age: 69
End: 2024-04-12
Attending: OBSTETRICS & GYNECOLOGY
Payer: COMMERCIAL

## 2024-04-12 DIAGNOSIS — Z51.11 ENCOUNTER FOR ANTINEOPLASTIC CHEMOTHERAPY: Primary | ICD-10-CM

## 2024-04-12 DIAGNOSIS — N95.0 POSTMENOPAUSAL BLEEDING: ICD-10-CM

## 2024-04-12 DIAGNOSIS — R93.89 THICKENED ENDOMETRIUM: ICD-10-CM

## 2024-04-12 LAB
ALBUMIN SERPL-MCNC: 4.4 G/DL (ref 3.2–4.8)
ALBUMIN/GLOB SERPL: 1.5 {RATIO} (ref 1–2)
ALP LIVER SERPL-CCNC: 118 U/L
ALT SERPL-CCNC: 28 U/L
ANION GAP SERPL CALC-SCNC: 8 MMOL/L (ref 0–18)
AST SERPL-CCNC: 27 U/L (ref ?–34)
BASOPHILS # BLD AUTO: 0.04 X10(3) UL (ref 0–0.2)
BASOPHILS NFR BLD AUTO: 0.6 %
BILIRUB SERPL-MCNC: 0.5 MG/DL (ref 0.2–1.1)
BUN BLD-MCNC: 18 MG/DL (ref 9–23)
BUN/CREAT SERPL: 28.6 (ref 10–20)
CALCIUM BLD-MCNC: 9.9 MG/DL (ref 8.7–10.4)
CANCER AG125 SERPL-ACNC: 15 U/ML (ref ?–30.2)
CANCER AG19-9 SERPL-ACNC: 8.9 U/ML (ref ?–35)
CEA SERPL-MCNC: 3.7 NG/ML (ref ?–5)
CHLORIDE SERPL-SCNC: 107 MMOL/L (ref 98–112)
CO2 SERPL-SCNC: 24 MMOL/L (ref 21–32)
CREAT BLD-MCNC: 0.63 MG/DL
DEPRECATED RDW RBC AUTO: 59.3 FL (ref 35.1–46.3)
EGFRCR SERPLBLD CKD-EPI 2021: 96 ML/MIN/1.73M2 (ref 60–?)
EOSINOPHIL # BLD AUTO: 0.14 X10(3) UL (ref 0–0.7)
EOSINOPHIL NFR BLD AUTO: 1.9 %
ERYTHROCYTE [DISTWIDTH] IN BLOOD BY AUTOMATED COUNT: 17.3 % (ref 11–15)
FASTING STATUS PATIENT QL REPORTED: NO
GLOBULIN PLAS-MCNC: 3 G/DL (ref 2.8–4.4)
GLUCOSE BLD-MCNC: 156 MG/DL (ref 70–99)
HCT VFR BLD AUTO: 38 %
HGB BLD-MCNC: 12.2 G/DL
IMM GRANULOCYTES # BLD AUTO: 0.03 X10(3) UL (ref 0–1)
IMM GRANULOCYTES NFR BLD: 0.4 %
LYMPHOCYTES # BLD AUTO: 2.09 X10(3) UL (ref 1–4)
LYMPHOCYTES NFR BLD AUTO: 28.9 %
MAGNESIUM SERPL-MCNC: 1.8 MG/DL (ref 1.6–2.6)
MCH RBC QN AUTO: 30.8 PG (ref 26–34)
MCHC RBC AUTO-ENTMCNC: 32.1 G/DL (ref 31–37)
MCV RBC AUTO: 96 FL
MONOCYTES # BLD AUTO: 0.75 X10(3) UL (ref 0.1–1)
MONOCYTES NFR BLD AUTO: 10.4 %
NEUTROPHILS # BLD AUTO: 4.19 X10 (3) UL (ref 1.5–7.7)
NEUTROPHILS # BLD AUTO: 4.19 X10(3) UL (ref 1.5–7.7)
NEUTROPHILS NFR BLD AUTO: 57.8 %
OSMOLALITY SERPL CALC.SUM OF ELEC: 293 MOSM/KG (ref 275–295)
PHOSPHATE SERPL-MCNC: 3.8 MG/DL (ref 2.4–5.1)
PLATELET # BLD AUTO: 226 10(3)UL (ref 150–450)
POTASSIUM SERPL-SCNC: 4.3 MMOL/L (ref 3.5–5.1)
PROT SERPL-MCNC: 7.4 G/DL (ref 5.7–8.2)
RBC # BLD AUTO: 3.96 X10(6)UL
SODIUM SERPL-SCNC: 139 MMOL/L (ref 136–145)
TSI SER-ACNC: 30.04 MIU/ML (ref 0.55–4.78)
WBC # BLD AUTO: 7.2 X10(3) UL (ref 4–11)

## 2024-04-12 PROCEDURE — 86304 IMMUNOASSAY TUMOR CA 125: CPT

## 2024-04-12 PROCEDURE — 85025 COMPLETE CBC W/AUTO DIFF WBC: CPT

## 2024-04-12 PROCEDURE — 80053 COMPREHEN METABOLIC PANEL: CPT

## 2024-04-12 PROCEDURE — 84443 ASSAY THYROID STIM HORMONE: CPT

## 2024-04-12 PROCEDURE — 84100 ASSAY OF PHOSPHORUS: CPT

## 2024-04-12 PROCEDURE — 86301 IMMUNOASSAY TUMOR CA 19-9: CPT

## 2024-04-12 PROCEDURE — 36415 COLL VENOUS BLD VENIPUNCTURE: CPT

## 2024-04-12 PROCEDURE — 82378 CARCINOEMBRYONIC ANTIGEN: CPT

## 2024-04-12 PROCEDURE — 83735 ASSAY OF MAGNESIUM: CPT

## 2024-04-16 ENCOUNTER — TELEMEDICINE (OUTPATIENT)
Dept: TELEHEALTH | Age: 69
End: 2024-04-16
Payer: COMMERCIAL

## 2024-04-16 DIAGNOSIS — S99.919A ANKLE INJURY, INITIAL ENCOUNTER: Primary | ICD-10-CM

## 2024-04-16 PROCEDURE — 99213 OFFICE O/P EST LOW 20 MIN: CPT | Performed by: NURSE PRACTITIONER

## 2024-04-16 NOTE — PROGRESS NOTES
Virtual/Telephone Check-In    Blanquita Hernandez verbally consents to a Virtual/Telephone Check-In service on 04/16/24.  Patient has been referred to the [facility] website at [email or web address] to review the yearly Consent to Treat document.  Patient understands and accepts financial responsibility for any deductible, co-insurance and/or co-pays associated with this service.       Telehealth Verbal Consent   I conducted a telehealth visit with Blanquita Hernandez today, 04/16/24, which was completed using two-way, real-time interactive audio and video communication. This has been done in good smith to provide continuity of care in the best interest of the provider-patient relationship, due to the COVID - public health crisis/national emergency where restrictions of face-to-face office visits are ongoing. Every conscious effort was taken to allow for sufficient and adequate time to complete the visit.  The patient was made aware of the limitations of the telehealth visit, including treatment limitations as no physical exam could be performed.  The patient was advised to call 911 or to go to the ER in case there was an emergency.  The patient was also advised of the potential privacy & security concerns related to the telehealth platform.   The patient was made aware of where to find [facility]'s notice of privacy practices, telehealth consent form and other related consent forms and documents.  which are located on the [facility] website. The patient verbally agreed to telehealth consent form, related consents and the risks discussed.    Lastly, the patient confirmed that they were in [place].   Included in this visit, time may have been spent reviewing labs, medications, radiology tests and decision making. Appropriate medical decision-making and tests are ordered as detailed in the plan of care above.  Coding/billing information is submitted for this visit based on complexity of care and/or time spent for the visit.    CHIEF  COMPLAINT:     Chief Complaint   Patient presents with    Ankle Pain       HPI:   Blanquita Hernandez is a 68 year old female who presents for a video visit.  Patient reports she slipped on area rug yesterday and fell.  Reports ankle is swollen and bruised; difficulty bearing wt on ankle.  No head injury or LOC    Current Outpatient Medications   Medication Sig Dispense Refill    Tirzepatide (MOUNJARO) 12.5 MG/0.5ML Subcutaneous Solution Pen-injector Inject 12.5 mg into the skin once a week. 6 mL 0    docusate sodium 100 MG Oral Cap Take 1 capsule (100 mg total) by mouth 2 (two) times daily as needed for constipation. 60 capsule 0    HYDROcodone-acetaminophen 5-325 MG Oral Tab Take 1 tablet by mouth every 6 (six) hours as needed for Pain. 20 tablet 0    ibuprofen 600 MG Oral Tab Take 1 tablet (600 mg total) by mouth every 8 (eight) hours as needed for Pain. 60 tablet 0    pravastatin 20 MG Oral Tab Take 1 tablet (20 mg total) by mouth every other day. 90 tablet 1    metoprolol succinate ER 50 MG Oral Tablet 24 Hr Take 1 tablet (50 mg total) by mouth daily. 90 tablet 1    clobetasol 0.05 % External Ointment Apply 2 x per day for 1 week, then 1 x per day for 1 week, then 2 - 3 x per week as directed 45 g 1    FLUoxetine HCl 40 MG Oral Cap Take 1 capsule (40 mg total) by mouth daily. 90 capsule 3    verapamil  MG Oral Tab CR Take 1 tablet (120 mg total) by mouth daily. 90 tablet 3    thyroid (NP THYROID) 90 MG Oral Tab Take 1 tablet (90 mg total) by mouth daily. Take with 15mg tablet for total of 105mg daily 90 tablet 3    thyroid (NP THYROID) 15 MG Oral Tab Take 1 tablet (15 mg total) by mouth daily. 90 tablet 3    zinc sulfate 220 (50 Zn) MG Oral Cap Take 1 capsule (220 mg total) by mouth daily.      Turmeric (QC TUMERIC COMPLEX OR) Take by mouth.      Probiotic Product (PROBIOTIC DAILY OR) Take by mouth once daily.      Cholecalciferol (VITAMIN D-3 OR) Take 15,000 Units by mouth daily.       aspirin 81 MG Oral Tab  Take 1 tablet (81 mg total) by mouth daily.  0    COENZYME Q-10 OR Take 1 tablet by mouth daily.      MAGNESIUM OR Take by mouth.      Ascorbic Acid (VITAMIN C OR) Take by mouth.      Vitamin B-12 500 MCG Oral Tab Take 1 tablet (500 mcg total) by mouth daily.        Past Medical History:    Allergic rhinitis    Anxiety    Arthritis    hips    Atherosclerosis of coronary artery    Cancer (HCC)    colon    Cataract    Depression    Diabetes (HCC)    Disorder of thyroid    hypothyroid    Essential hypertension    Hearing impairment    [place]    High blood pressure    Hyperlipidemia    Hypertension    Hypothyroidism    PONV (postoperative nausea and vomiting)    Sleep apnea    Spinal stenosis    Thyroid disease    Vertigo    Visual impairment    readers      Past Surgical History:   Procedure Laterality Date    Adenoidectomy        x 3    Cataract Bilateral 2017    Dr. Perkins    Colonoscopy  2021    A few small colon polyps removed.  Scar and previous SPOT ink across from IC valve.  No residual polyp.  biopsies taken.     Colonoscopy      Endometrial ablation  2009    Hip replacement surgery  2018    Tonsillectomy      Total hip replacement Left 2017    Dr. Del Cid     Total hip replacement Right 2017    Dr. Del Cid         Social History     Socioeconomic History    Marital status:    Occupational History    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[place]   Tobacco Use    Smoking status: Never    Smokeless tobacco: Never   Vaping Use    Vaping status: Never Used   Substance and Sexual Activity    Alcohol use: No    Drug use: No    Sexual activity: Not Currently   Other Topics Concern    Caffeine Concern No    Exercise Yes    Seat Belt No    Weight Concern Yes   Social History Narrative    Lives with daughter    Feels safe     Social Determinants of Health     Food Insecurity: No Food Insecurity (2023)    Food Insecurity     Food Insecurity: Never true    Transportation Needs: No Transportation Needs (12/21/2023)    Transportation Needs     Lack of Transportation: No   Housing Stability: Low Risk  (12/21/2023)    Housing Stability     Housing Instability: No         REVIEW OF SYSTEMS:   GENERAL: normal appetite  SKIN: see HPI  LUNGS: no shortness of breath or wheezing, See HPI  CARDIOVASCULAR: no chest pain or palpitations   M/S:  See HPI  NEURO: no numbness    EXAM:   General: Alert, Well-appearing, and In no acute distress  Respiratory:   Speaking in full sentences comfortably  Normal work of breathing  Head: Normocephalic  M/S:  ankle not viewed on video  Mood: Affect appropriate      ASSESSMENT AND PLAN:   Blanquita Hernandez is a 68 year old female who presents with symptoms that are consistent with    ASSESSMENT/PLAN:     Diagnoses and all orders for this visit:    Ankle injury, initial encounter       S/P fall yesterday  A higher level of care was recommended to pt d/t limitations of telehealth.   Referred to IC for further eval,  Pt has someone to drive but uncertain if she can get down the stairs.  Advised she can call her local FD to ask for assist or call 911 for ambulance transport to ED.    Patient verbalized understanding of rationale for further evaluation and appeared stable upon discharge.      Face to face time spent on Video Visit: 5 min  Total Time spent on visit including reviewing history, ordering labs/medication, patient examination and education: 7 min

## 2024-04-18 ENCOUNTER — APPOINTMENT (OUTPATIENT)
Dept: GENERAL RADIOLOGY | Age: 69
End: 2024-04-18
Attending: PHYSICIAN ASSISTANT
Payer: COMMERCIAL

## 2024-04-18 ENCOUNTER — HOSPITAL ENCOUNTER (OUTPATIENT)
Age: 69
Discharge: HOME | End: 2024-04-18
Payer: COMMERCIAL

## 2024-04-18 VITALS
HEART RATE: 108 BPM | RESPIRATION RATE: 20 BRPM | DIASTOLIC BLOOD PRESSURE: 70 MMHG | TEMPERATURE: 98 F | OXYGEN SATURATION: 100 % | SYSTOLIC BLOOD PRESSURE: 101 MMHG
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
## 2024-04-18 DIAGNOSIS — R26.81 GAIT INSTABILITY: ICD-10-CM

## 2024-04-18 DIAGNOSIS — S82.861A MAISONNEUVE FRACTURE OF FIBULA, RIGHT, CLOSED, INITIAL ENCOUNTER: Primary | ICD-10-CM

## 2024-04-18 DIAGNOSIS — S82.841A BIMALLEOLAR FRACTURE OF RIGHT ANKLE, CLOSED, INITIAL ENCOUNTER: ICD-10-CM

## 2024-04-18 PROCEDURE — 29505 APPLICATION LONG LEG SPLINT: CPT | Performed by: PHYSICIAN ASSISTANT

## 2024-04-18 PROCEDURE — 73610 X-RAY EXAM OF ANKLE: CPT | Performed by: PHYSICIAN ASSISTANT

## 2024-04-18 PROCEDURE — 73630 X-RAY EXAM OF FOOT: CPT | Performed by: PHYSICIAN ASSISTANT

## 2024-04-18 PROCEDURE — 99214 OFFICE O/P EST MOD 30 MIN: CPT | Performed by: PHYSICIAN ASSISTANT

## 2024-04-18 PROCEDURE — 73590 X-RAY EXAM OF LOWER LEG: CPT | Performed by: PHYSICIAN ASSISTANT

## 2024-04-18 RX ORDER — HYDROCODONE BITARTRATE AND ACETAMINOPHEN 5; 325 MG/1; MG/1
1 TABLET ORAL ONCE
Status: COMPLETED | OUTPATIENT
Start: 2024-04-18 | End: 2024-04-18

## 2024-04-18 RX ORDER — TRAMADOL HYDROCHLORIDE 50 MG/1
50 TABLET ORAL EVERY 6 HOURS PRN
Qty: 20 TABLET | Refills: 0 | Status: SHIPPED | OUTPATIENT
Start: 2024-04-18

## 2024-04-18 NOTE — ED PROVIDER NOTES
Patient Seen in: [facility]      History     Chief Complaint   Patient presents with    Fall     Stated Complaint: Fracture, Minor    Subjective:   HPI    Patient is a 68-year-old female with history below, presenting to immediate care for evaluation of acute right ankle injury.  Onset: 2 days ago.  Patient slipped/tripped on an area rug causing her to fall backwards.  Felt \"crunching noise\" in ankle.  Since has been experiencing right medial ankle pain with associated swelling and bruising.  Swelling and bruising has extended to foot.  Denies any associated foot pain.  She is unable to bear weight secondary to injury.  Initially had virtual visit on 2024 and referred to immediate care for physical examination and x-ray imaging for further evaluation of underlying possible fracture.  She denies any associated new numbness weakness or paresthesia.  Denies any head injury or LOC.  No other injuries.    Objective:   Past Medical History:    Allergic rhinitis    Anxiety    Arthritis    hips    Atherosclerosis of coronary artery    Cancer (HCC)    colon    Cataract    Depression    Diabetes (HCC)    Disorder of thyroid    hypothyroid    Essential hypertension    Hearing impairment    [place]    High blood pressure    Hyperlipidemia    Hypertension    Hypothyroidism    PONV (postoperative nausea and vomiting)    Sleep apnea    Spinal stenosis    Thyroid disease    Vertigo    Visual impairment    readers              Past Surgical History:   Procedure Laterality Date    Adenoidectomy        x 3    Cataract Bilateral 2017    Dr. Perkins    Colonoscopy  2021    A few small colon polyps removed.  Scar and previous SPOT ink across from IC valve.  No residual polyp.  biopsies taken.     Colonoscopy      Endometrial ablation  2009    Hip replacement surgery  2018    Tonsillectomy      Total hip replacement Left 2017    Dr. Del Cid     Total hip replacement Right 2017    Dr. 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[place]   Tobacco Use    Smoking status: Never    Smokeless tobacco: Never   Vaping Use    Vaping status: Never Used   Substance and Sexual Activity    Alcohol use: No    Drug use: No    Sexual activity: Not Currently   Other Topics Concern    Caffeine Concern No    Exercise Yes    Seat Belt No    Weight Concern Yes   Social History Narrative    Lives with daughter    Feels safe     Social Determinants of Health     Food Insecurity: No Food Insecurity (12/21/2023)    Food Insecurity     Food Insecurity: Never true   Transportation Needs: No Transportation Needs (12/21/2023)    Transportation Needs     Lack of Transportation: No   Housing Stability: Low Risk  (12/21/2023)    Housing Stability     Housing Instability: No              Review of Systems   Constitutional:  Positive for activity change.   Musculoskeletal:  Positive for gait problem and joint swelling.        Right ankle pain and swelling   Skin:  Positive for color change.        Right ankle bruising   Psychiatric/Behavioral:  Negative for confusion.    All other systems reviewed and are negative.      Positive for stated complaint: Fracture, Minor  Other systems are as noted in HPI.  Constitutional and vital signs reviewed.      All other systems reviewed and negative except as noted above.    Physical Exam     ED Triage Vitals [04/18/24 1434]   /70   Pulse 108   Resp 20   Temp 97.6 °F (36.4 °C)   Temp src Temporal   SpO2 100 %   O2 Device None (Room air)       Current:/70   Pulse 108   Temp 97.6 °F (36.4 °C) (Temporal)   Resp 20   SpO2 100%         Physical Exam  Vitals and nursing note reviewed.   Constitutional:       Appearance: Normal appearance.   HENT:      Head: Normocephalic and atraumatic.   Eyes:      Conjunctiva/sclera: Conjunctivae normal.   Musculoskeletal:          General: Swelling, tenderness and signs of injury present.      Comments: Palpation with soft tissue swelling and bruising medial aspect of left ankle.  There is dependent edema and bruising with associated swelling left foot and midfoot.  Foot is nontender.  Compartments soft.  Capillary Flextra seconds.  DP and PT pulse intact   Skin:     Findings: Bruising present.   Neurological:      Mental Status: She is alert.      Coordination: Coordination abnormal.      Gait: Gait abnormal.   Psychiatric:         Mood and Affect: Mood normal.         Behavior: Behavior normal.             ED Course   Labs Reviewed - No data to display  XR TIBIA + FIBULA (2 VIEWS), RIGHT (CPT=73590)   Final Result   PROCEDURE: XR TIBIA + FIBULA (2 VIEWS), RIGHT (CPT=73590)       COMPARISON: [facility] in [place], XR ANKLE (MIN 3    VIEWS), RIGHT (CPT=73610), 4/18/2024, 2:48 PM.       INDICATIONS: Right lower leg/ankle pain. Fall injury.       TECHNIQUE: 2 views were obtained.         FINDINGS:                    =====   CONCLUSION:        Medial and lateral malleolar fractures are partially seen and better    characterized on concurrently performed ankle radiographs.       Subtle nondisplaced fracture of the fibular neck consistent with a    maisonneuve fracture                Dictated by (CST): Paras Pickering MD on 4/18/2024 at 3:41 PM        Finalized by (CST): Paras Pickering MD on 4/18/2024 at 3:43 PM               XR FOOT, COMPLETE (MIN 3 VIEWS), RIGHT (CPT=73630)   Final Result   PROCEDURE: XR FOOT, COMPLETE (MIN 3 VIEWS), RIGHT (CPT=73630) 3       COMPARISON: [facility] in [place], XR ANKLE (MIN 3    VIEWS), RIGHT (CPT=73610), 4/18/2024, 2:48 PM.       INDICATIONS: Pain and swelling to entire dorsal right foot. Unable to bear    weight. Fall injury.       TECHNIQUE: 3 views were obtained.         FINDINGS:    BONES: There is an obliquely oriented fracture line through the distal    fibula.  There is  questioned irregularity of the medial malleolus.  There    is diffuse osseous demineralization, which limits sensitivity for    detection of osseous pathology.  No acute    fracture or osseous malalignment.  There is mild first metatarsophalangeal    joint narrowing with osteophyte formation and sclerosis.  An os naviculare    is noted.  Hammertoe deformities of the second and third digits are noted.   SOFT TISSUES: Ankle swelling noted.    EFFUSION: None visible.    OTHER: Negative.                    =====   CONCLUSION:    Mildly displaced fractures of the distal fibula and medial malleolus, more    completely assessed on dedicated ankle radiographs       Mild first metatarsophalangeal joint osteoarthritis.               Dictated by (CST): Aurelia Fernandez MD on 4/18/2024 at 3:14 PM        Finalized by (CST): Aurelia Fernandez MD on 4/18/2024 at 3:16 PM               XR ANKLE (MIN 3 VIEWS), RIGHT (CPT=73610)   Final Result   PROCEDURE: XR ANKLE (MIN 3 VIEWS), RIGHT (CPT=73610)       COMPARISON: None.       INDICATIONS: Pain and swelling to entire right ankle. Unable to bear    weight. Fall injury.       TECHNIQUE: 3 views were obtained.         FINDINGS:    BONES: There is a comminuted, mildly displaced fracture of the medial    malleolus, with slight medial displacement of the dominant distal fracture    fragment in relation to the proximal bone.  There is an obliquely oriented    fracture through the distal    fibular metadiaphysis with mild lateral displacement of the distal fibular    fracture fragment in relation to the proximal bone.  There is no definite    posterior malleolar fracture identified.   SOFT TISSUES: Severe soft tissue swelling about the ankle.   EFFUSION: None visible.    OTHER: Negative.                    =====   CONCLUSION:    Mildly displaced bimalleolar fracture, with mildly comminuted medial    malleolar fracture.  No definite posterior malleolar fracture.  CT of the    ankle can be  performed for surgical planning purposes, as clinically    directed by orthopedic surgery.               Dictated by (CST): Aurelia Fernandez MD on 4/18/2024 at 3:16 PM        Finalized by (CST): Aurelia Fernandez MD on 4/18/2024 at 3:18 PM                                MDM     Patient is 68-year-old female, presenting to immediate care for evaluation of acute right ankle injury status post slipping/tripping on carpet.  Associated pain, swelling, bruising on left ankle predominately medial aspect with associated dependent edema left foot.  Unable to bear weight on affected leg.  She is neurovascular intact with compartments soft.  Pain is controlled.  Initial evaluation with x-ray imaging notable for displaced fracture right distal and fibula and right medial malleolus.  Additional x-ray obtained rule out proximal/distal fracture.  Initial x-ray imaging of right tibia/fibula obtained to rule out proximal fracture.  Findings are suggestive of Maisonneuve fracture, there is subtle nondisplaced fracture proximal fibular neck. Will treat outpatient with close orthopedic follow-up for evaluation/management of right closed ankle displaced fracture. Long leg posterior leg splint and for fracture immobilization. Home wheelchair for nonweightbearing status.  Copy imaging results provided to patient/family.  Orthopedic follow-up.    Start Taking               traMADol 50 MG Oral Tab Take 1 tablet (50 mg total) by mouth every 6 (six) hours as needed for Pain.          Ordered Clinic-Administered Medications         Dose Freq    HYDROcodone-acetaminophen (Norco) 5-325 MG per tab 1 tablet 1 tablet Once    Route: Oral    HYDROcodone-acetaminophen (Norco) 5-325 MG per tab      Notes to Pharmacy: Alber Milton   : cabinet override                   Medical Decision Making      Disposition and Plan     Clinical Impression:  1. Maisonneuve fracture of fibula, right, closed, initial encounter    2. Fall due to tripping on loose  carpet    3. Gait instability    4. Bimalleolar fracture of right ankle, closed, initial encounter         Disposition:  Discharge  4/18/2024  4:04 pm    Follow-up:  [facility] - [address]  [facility] [address]  Schedule an appointment as soon as possible for a visit   Orthopedics. Phone: [phone]    [facility]  [address] [address] [phone]    [facility]          Medications Prescribed:  Discharge Medication List as of 4/18/2024  4:11 PM

## 2024-04-18 NOTE — ED INITIAL ASSESSMENT (HPI)
Pt tripped back on rug on Tuesday injuring her right ankle \"hearing a crunch\" with pain, inflammation, and bruising; unable to bear weight; last chemo on Monday

## 2024-04-18 NOTE — DISCHARGE INSTRUCTIONS
XR Result: Mildly displaced fractures of the distal fibula and medial malleolus, more completely assessed on dedicated ankle radiographs

## 2024-04-22 ENCOUNTER — TELEPHONE (OUTPATIENT)
Dept: ORTHOPEDICS CLINIC | Facility: CLINIC | Age: 69
End: 2024-04-22

## 2024-04-22 ENCOUNTER — TELEPHONE (OUTPATIENT)
Facility: CLINIC | Age: 69
End: 2024-04-22

## 2024-04-22 ENCOUNTER — HOSPITAL ENCOUNTER (OUTPATIENT)
Dept: GENERAL RADIOLOGY | Age: 69
Discharge: HOME | End: 2024-04-22
Attending: ORTHOPAEDIC SURGERY
Payer: COMMERCIAL

## 2024-04-22 ENCOUNTER — OFFICE VISIT (OUTPATIENT)
Dept: ORTHOPEDICS CLINIC | Facility: CLINIC | Age: 69
End: 2024-04-22
Payer: COMMERCIAL

## 2024-04-22 VITALS — BODY MASS INDEX: 23.55 KG/M2 | WEIGHT: 159 LBS | HEIGHT: 69 IN

## 2024-04-22 DIAGNOSIS — S82.841A BIMALLEOLAR ANKLE FRACTURE, RIGHT, CLOSED, INITIAL ENCOUNTER: Primary | ICD-10-CM

## 2024-04-22 DIAGNOSIS — M25.571 ACUTE RIGHT ANKLE PAIN: ICD-10-CM

## 2024-04-22 DIAGNOSIS — M25.571 ACUTE RIGHT ANKLE PAIN: Primary | ICD-10-CM

## 2024-04-22 PROCEDURE — 73610 X-RAY EXAM OF ANKLE: CPT | Performed by: ORTHOPAEDIC SURGERY

## 2024-04-22 NOTE — TELEPHONE ENCOUNTER
XR ordered per ortho protocol. XR scheduled and patient was notified via [facility] to let them know that they should arrive 15-20 minutes early, in order for them to complete imaging.

## 2024-04-22 NOTE — TELEPHONE ENCOUNTER
Pt fell and fractured right ankle , will have an emergency surgery on Friday, need a preop clearance    appt tomorrow   Had appt with surgeon today     Please advise, No appts with any providers at [facility]

## 2024-04-22 NOTE — PROGRESS NOTES
[facility] New Patient Note    Chief Complaint   Patient presents with    Ankle Injury     RT ANKLE INJURY; DOI: 24       HPI: The patient is a 68 year old female with history of endometrial CA on chemotherapy who presents with her daughter with complaints of acute right ankle pain and swelling from a twisting injury sustained on 2024.  She describes tripping on an area rug and twisting her ankle awkwardly.  Significant swelling, bruising and difficulty weightbearing prompted a visit to the urgent care.  A fracture of the ankle was identified and she was provisionally splinted, given pain medication and advised to follow-up with orthopedics.  She complains of the posterior mold digging into the back of her knee and being uncomfortable.  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  Her last treatment  is reportedly scheduled in about 2 weeks.    Past Medical History:    Allergic rhinitis    Anxiety    Arthritis    hips    Atherosclerosis of coronary artery    Cancer (HCC)    colon    Cataract    Depression    Diabetes (HCC)    Disorder of thyroid    hypothyroid    Essential hypertension    Hearing impairment    [place]    High blood pressure    Hyperlipidemia    Hypertension    Hypothyroidism    PONV (postoperative nausea and vomiting)    Sleep apnea    Spinal stenosis    Thyroid disease    Vertigo    Visual impairment    readers     Past Surgical History:   Procedure Laterality Date    Adenoidectomy        x 3    Cataract Bilateral 2017    Dr. Perkins    Colonoscopy  2021    A few small colon polyps removed.  Scar and previous SPOT ink across from IC valve.  No residual polyp.  biopsies taken.     Colonoscopy      Endometrial ablation  2009    Hip replacement surgery  2018    Tonsillectomy      Total hip replacement Left 2017    Dr. Del Cid     Total hip replacement Right 2017    Dr. Del Cid     Current Outpatient Medications    Medication Sig Dispense Refill    traMADol 50 MG Oral Tab Take 1 tablet (50 mg total) by mouth every 6 (six) hours as needed for Pain. 20 tablet 0    thyroid (NP THYROID) 120 MG Oral Tab Take 1 tablet (120 mg total) by mouth daily. 90 tablet 0    Tirzepatide (MOUNJARO) 12.5 MG/0.5ML Subcutaneous Solution Pen-injector Inject 12.5 mg into the skin once a week. 6 mL 0    docusate sodium 100 MG Oral Cap Take 1 capsule (100 mg total) by mouth 2 (two) times daily as needed for constipation. 60 capsule 0    ibuprofen 600 MG Oral Tab Take 1 tablet (600 mg total) by mouth every 8 (eight) hours as needed for Pain. 60 tablet 0    pravastatin 20 MG Oral Tab Take 1 tablet (20 mg total) by mouth every other day. 90 tablet 1    metoprolol succinate ER 50 MG Oral Tablet 24 Hr Take 1 tablet (50 mg total) by mouth daily. 90 tablet 1    clobetasol 0.05 % External Ointment Apply 2 x per day for 1 week, then 1 x per day for 1 week, then 2 - 3 x per week as directed 45 g 1    FLUoxetine HCl 40 MG Oral Cap Take 1 capsule (40 mg total) by mouth daily. 90 capsule 3    verapamil  MG Oral Tab CR Take 1 tablet (120 mg total) by mouth daily. 90 tablet 3    zinc sulfate 220 (50 Zn) MG Oral Cap Take 1 capsule (220 mg total) by mouth daily.      Turmeric (QC TUMERIC COMPLEX OR) Take by mouth.      Probiotic Product (PROBIOTIC DAILY OR) Take by mouth once daily.      Cholecalciferol (VITAMIN D-3 OR) Take 15,000 Units by mouth daily.       aspirin 81 MG Oral Tab Take 1 tablet (81 mg total) by mouth daily.  0    COENZYME Q-10 OR Take 1 tablet by mouth daily.      MAGNESIUM OR Take by mouth.      Ascorbic Acid (VITAMIN C OR) Take by mouth.      Vitamin B-12 500 MCG Oral Tab Take 1 tablet (500 mcg total) by mouth daily.      HYDROcodone-acetaminophen 5-325 MG Oral Tab Take 1 tablet by mouth every 6 (six) hours as needed for Pain. (Patient not taking: Reported on 4/22/2024) 20 tablet 0     Allergies   Allergen Reactions    Amoxicillin FEVER     Sulfa Antibiotics OTHER (SEE COMMENTS)     Don't remember     Family History   Problem Relation Age of Onset    Heart Disease Father     Cancer Mother         Endometrial, liver, and uterer, colon    Endometrial Cancer Mother         treated with radiation and surgery    Colon Cancer Mother     Colon Cancer Brother     No Known Problems Maternal Grandmother         lived to age 95    Cancer Maternal Grandfather 78        leukemia    Heart Disease Paternal Grandmother     Stroke Paternal Grandmother 68    Lung Disorder Paternal Grandfather         emphysema    Breast Cancer Neg     Ovarian Cancer Neg      Social History     Occupational History    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[place]   Tobacco Use    Smoking status: Never    Smokeless tobacco: Never   Vaping Use    Vaping status: Never Used   Substance and Sexual Activity    Alcohol use: No    Drug use: No    Sexual activity: Not Currently        ROS:  Complete ROS reviewed by me and non-contributory to the chief complaint except as mentioned above.    Physical Exam:    Ht 5' 9\" (1.753 m)   Wt 159 lb (72.1 kg)   BMI 23.48 kg/m²   Constitutional: Well developed, well nourished 68 year old female presenting with her daughter  Psychological: NAD, alert and appropriate  Respiratory: Breathing comfortably on room air with RR of 10-14  Cardiac: Palpable distal pulses with pink warm extremities  Right lower extremity posterior splint is removed.  Diffuse ecchymosis, soft tissue swelling is noted about the right ankle with intact overlying skin.  She wiggles her toes freely and has intact sensation to light touch with palpable pedal pulses.  Proximal fibula, knee and proximal tibia are completely nontender without discoloration or swelling.  Neurovascular status is intact on sensory, motor and perfusion assessment distally.    Imaging: Multiple views right ankle as well as AP lateral views right tibia and fibula personally viewed, demonstrating  newly displaced bimalleolar ankle fracture with a posterior oblique distal fibula and oblique medial malleolar fracture.  No dislocation or subluxation is noted.  There is a cortical irregularity at the posterior aspect of the fibular neck proximally which was suggested to be a possible acute Maisonneuve variant.  Patient is totally nontender in this area.    XR TIBIA + FIBULA (2 VIEWS), RIGHT (CPT=73590)    Result Date: 4/18/2024  CONCLUSION:   Medial and lateral malleolar fractures are partially seen and better characterized on concurrently performed ankle radiographs.  Subtle nondisplaced fracture of the fibular neck consistent with a maisonneuve fracture    Dictated by (CST): Paras Pickering MD on 4/18/2024 at 3:41 PM     Finalized by (CST): Paras Pickering MD on 4/18/2024 at 3:43 PM          XR ANKLE (MIN 3 VIEWS), RIGHT (CPT=73610)    Result Date: 4/18/2024  CONCLUSION:  Mildly displaced bimalleolar fracture, with mildly comminuted medial malleolar fracture.  No definite posterior malleolar fracture.  CT of the ankle can be performed for surgical planning purposes, as clinically directed by orthopedic surgery.    Dictated by (CST): Aurelia Fernandez MD on 4/18/2024 at 3:16 PM     Finalized by (CST): Aurelia Fernandez MD on 4/18/2024 at 3:18 PM          XR FOOT, COMPLETE (MIN 3 VIEWS), RIGHT (CPT=73630)    Result Date: 4/18/2024  CONCLUSION:  Mildly displaced fractures of the distal fibula and medial malleolus, more completely assessed on dedicated ankle radiographs  Mild first metatarsophalangeal joint osteoarthritis.    Dictated by (CST): Aurelia Fernandez MD on 4/18/2024 at 3:14 PM     Finalized by (CST): Aurelia Fernandez MD on 4/18/2024 at 3:16 PM             Assessment/Diagnoses:  Diagnoses and all orders for this visit:    Bimalleolar ankle fracture, right, closed, initial encounter  -     DME - External    Acute right ankle pain  -     DME - External      Plan:  I reviewed imaging and exam findings with the  patient and her daughter. There is a displaced unstable bimalleolar ankle fracture for which I recommend operative treatment.  This would include open reduction and internal fixation of the medial and lateral fragments with possible fixation of the syndesmosis if instability is noted intraoperatively.  The likelihood for a Maisonneuve of the variation based on the distal fracture line is felt to be low.  She is also nontender at the fibular neck.  This is therefore likely a chronic radiographic finding.  The standard recommendation of treatment for normal ambulator would be surgical fixation of the medial and lateral aspect of this ankle.  Risk, benefits, and alternatives to surgical management were discussed including but not limited to possible infection, bleeding, neurovascular injury, delayed union, nonunion, malunion, symptoms from implanted hardware, posttraumatic arthrosis, continued pain, instability or failed improvement despite surgery.  Risks of nonoperative care were also reviewed which are far outweighed by the potential benefits of restoring normal anatomy and function to the ankle.  Finally risks of anesthesia were reviewed including cardiac, pulmonary or cerebrovascular complications any of which could be serious or life-threatening.  If the patient is deemed to be medically optimized, my recommendation is to proceed with open reduction and internal fixation of this ankle fracture under anesthesia.  Unfortunately, she has endometrial cancer and is undergoing chemotherapy.  This theoretically could increase her risk for perioperative infection or complications.  She relates that her final treatment is scheduled for a couple of weeks from now and if this can be delayed in any way for allow for initial wound healing following surgery this would be ideal.  All questions were answered to the satisfaction of the patient and family and they elect to proceed.  We will arrange for operative treatment as soon  as conveniently possible at [facility] hopefully later this week.  For now we will immobilize her with the option for the posterior splint versus a removable cam boot.  The importance of elevating the lower extremity at or above heart level was emphasized.  She was advised to wiggle her toes, discontinue aspirin and remain strictly nonweightbearing during this time leading up to surgery.  If she experiences any new or worsening symptoms reassessment is advised.  All questions were answered and the patient and her daughter verbalized understanding and appreciation.    Genoveva Cano MD  [facility]      This document was partially prepared using Dragon Medical voice recognition software.

## 2024-04-22 NOTE — TELEPHONE ENCOUNTER
Patient needs to be put on schedule for Friday, 04/26/24 per Dr. Cano. Patient will call PCP to get in to see them tomorrow, 04/23/24.

## 2024-04-23 ENCOUNTER — TELEPHONE (OUTPATIENT)
Dept: ORTHOPEDICS CLINIC | Facility: CLINIC | Age: 69
End: 2024-04-23

## 2024-04-23 ENCOUNTER — OFFICE VISIT (OUTPATIENT)
Facility: CLINIC | Age: 69
End: 2024-04-23
Payer: COMMERCIAL

## 2024-04-23 VITALS
HEART RATE: 97 BPM | BODY MASS INDEX: 23 KG/M2 | SYSTOLIC BLOOD PRESSURE: 124 MMHG | DIASTOLIC BLOOD PRESSURE: 78 MMHG | OXYGEN SATURATION: 98 % | HEIGHT: 69 IN

## 2024-04-23 DIAGNOSIS — S82.841D ANKLE FRACTURE, BIMALLEOLAR, CLOSED, RIGHT, WITH ROUTINE HEALING, SUBSEQUENT ENCOUNTER: ICD-10-CM

## 2024-04-23 DIAGNOSIS — E03.9 HYPOTHYROIDISM, UNSPECIFIED TYPE: ICD-10-CM

## 2024-04-23 DIAGNOSIS — S82.841A BIMALLEOLAR ANKLE FRACTURE, RIGHT, CLOSED, INITIAL ENCOUNTER: Primary | ICD-10-CM

## 2024-04-23 DIAGNOSIS — E78.2 MIXED HYPERLIPIDEMIA: ICD-10-CM

## 2024-04-23 DIAGNOSIS — Z01.818 PREOP EXAMINATION: Primary | ICD-10-CM

## 2024-04-23 DIAGNOSIS — E11.29 TYPE 2 DIABETES MELLITUS WITH MICROALBUMINURIA (HCC): ICD-10-CM

## 2024-04-23 DIAGNOSIS — R80.9 TYPE 2 DIABETES MELLITUS WITH MICROALBUMINURIA (HCC): ICD-10-CM

## 2024-04-23 DIAGNOSIS — I10 ESSENTIAL HYPERTENSION: ICD-10-CM

## 2024-04-23 PROBLEM — N95.0 POSTMENOPAUSAL BLEEDING: Status: RESOLVED | Noted: 2021-11-15 | Resolved: 2024-04-23

## 2024-04-23 PROBLEM — C55 MALIGNANT NEOPLASM OF UTERUS (HCC): Status: ACTIVE | Noted: 2024-04-23

## 2024-04-23 PROBLEM — C55 MALIGNANT NEOPLASM OF UTERUS (HCC): Status: RESOLVED | Noted: 2024-04-23 | Resolved: 2024-04-23

## 2024-04-23 PROBLEM — R93.89 THICKENED ENDOMETRIUM: Status: RESOLVED | Noted: 2023-12-08 | Resolved: 2024-04-23

## 2024-04-23 PROCEDURE — 3044F HG A1C LEVEL LT 7.0%: CPT | Performed by: FAMILY MEDICINE

## 2024-04-23 PROCEDURE — 99214 OFFICE O/P EST MOD 30 MIN: CPT | Performed by: FAMILY MEDICINE

## 2024-04-23 PROCEDURE — 3008F BODY MASS INDEX DOCD: CPT | Performed by: FAMILY MEDICINE

## 2024-04-23 PROCEDURE — 3078F DIAST BP <80 MM HG: CPT | Performed by: FAMILY MEDICINE

## 2024-04-23 PROCEDURE — 3074F SYST BP LT 130 MM HG: CPT | Performed by: FAMILY MEDICINE

## 2024-04-23 RX ORDER — POLYETHYLENE GLYCOL 3350 17 G/17G
17 POWDER, FOR SOLUTION ORAL DAILY
COMMUNITY

## 2024-04-23 RX ORDER — PRAVASTATIN SODIUM 20 MG
20 TABLET ORAL NIGHTLY
COMMUNITY
Start: 2024-04-23

## 2024-04-23 RX ORDER — TRAMADOL HYDROCHLORIDE 50 MG/1
100 TABLET ORAL EVERY 6 HOURS PRN
Qty: 60 TABLET | Refills: 0 | Status: SHIPPED | OUTPATIENT
Start: 2024-04-23

## 2024-04-23 NOTE — PROGRESS NOTES
Blanquita Hernandez is a 68 year old female.  Chief Complaint   Patient presents with    Pre-Op Exam     Fell and broke her right ankle in two places       HPI:   PREOP EXAM    PRE-OP Physical  What is the full name of procedure/ surgery? ORIF of right bimalleolar ankle fracture   Date surgery or procedure is being done? 4/26/2024  What is the doctor’s full name  that is doing the surgery? Dr. Genoveva Cano  What hospital or facility will the procedure occur? [facility]   Has been a little unsteady on her feet due to the side effects of chemotherapy, but tripped on a rug a week ago and broke her right ankle in two places.  Currently on cycle 5 out of 6 to treat her endometrial carcinoma, which was stage 1C. Did very well with the hysterectomy without any anesthesia complications. Also denies any family history of malignant hyperthermia.   She does have a lot of fatigue and pain from the chemotherapy. Taking Tramadol for pain, but it is not really helping.   Reports her blood sugars are elevated despite eating well and taking the Mounjaro, but this is likely from the steroids for chemotherapy.   ALLERGIES:  Allergies   Allergen Reactions    Amoxicillin FEVER    Sulfa Antibiotics OTHER (SEE COMMENTS)     Don't remember       Immunization History   Administered Date(s) Administered    Covid-19 Vaccine Moderna 100 mcg/0.5 ml 01/28/2021, 03/01/2021, 11/08/2021    Covid-19 Vaccine Moderna 50 Mcg/0.25 Ml 03/31/2022    Covid-19 Vaccine Moderna Bivalent 50mcg/0.5mL 12+ years 09/29/2022    FLU VAC High Dose 65 YRS & Older PRSV Free ([id number]) 02/25/2020, 12/29/2020, 10/26/2021, 09/28/2023    FLULAVAL 6 months & older 0.5 ml Prefilled syringe (75674) 09/10/2018    Influenza 09/01/2017    Pneumococcal (Prevnar 13) 02/25/2020    Pneumococcal Conjugate PCV20 05/30/2023    Pneumovax 23 01/05/2018    TDAP 09/10/2018    Zoster Vaccine Recombinant Adjuvanted (Shingrix) 01/15/2018, 05/05/2018, 07/15/2018, 12/15/2018       Past Surgical  History:   Procedure Laterality Date    Adenoidectomy      Bilat salpingo-oophorect w/ omentect, total abdom hyste  2023      x 3    Cataract Bilateral 2017    Dr. Perkins    Colonoscopy  2021    A few small colon polyps removed.  Scar and previous SPOT ink across from IC valve.  No residual polyp.  biopsies taken.     Colonoscopy      Endometrial ablation      Hip replacement surgery  2018    Hysterectomy  2023    Tonsillectomy      Total hip replacement Left 2017    Dr. Del Cid     Total hip replacement Right 2017    Dr. Del Cid     Family Status   Relation Status    Fa     Mo     Bro     MGMA     MGFA     PGMA     PGFA     Bro Alive    NEG (Not Specified)     Family History   Problem Relation Age of Onset    Heart Disease Father     Cancer Mother         Endometrial, liver, and uterer, colon    Endometrial Cancer Mother         treated with radiation and surgery    Colon Cancer Mother     Colon Cancer Brother     No Known Problems Maternal Grandmother         lived to age 95    Cancer Maternal Grandfather 78        leukemia    Heart Disease Paternal Grandmother     Stroke Paternal Grandmother 68    Lung Disorder Paternal Grandfather         emphysema    Breast Cancer Neg     Ovarian Cancer Neg        Current Outpatient Medications   Medication Sig Dispense Refill    polyethylene glycol, PEG 3350, 17 g Oral Powd Pack Take 17 g by mouth daily.      pravastatin 20 MG Oral Tab Take 1 tablet (20 mg total) by mouth nightly.      traMADol 50 MG Oral Tab Take 2 tablets (100 mg total) by mouth every 6 (six) hours as needed for Pain. 60 tablet 0    thyroid (NP THYROID) 120 MG Oral Tab Take 1 tablet (120 mg total) by mouth daily. 90 tablet 0    Tirzepatide (MOUNJARO) 12.5 MG/0.5ML Subcutaneous Solution Pen-injector Inject 12.5 mg into the skin once a week. (Patient taking differently: Inject 12.5 mg into the skin once a week.  Saturdays   last dose 4/20) 6 mL 0    docusate sodium 100 MG Oral Cap Take 1 capsule (100 mg total) by mouth 2 (two) times daily as needed for constipation. 60 capsule 0    metoprolol succinate ER 50 MG Oral Tablet 24 Hr Take 1 tablet (50 mg total) by mouth daily. 90 tablet 1    clobetasol 0.05 % External Ointment Apply 2 x per day for 1 week, then 1 x per day for 1 week, then 2 - 3 x per week as directed 45 g 1    FLUoxetine HCl 40 MG Oral Cap Take 1 capsule (40 mg total) by mouth daily. 90 capsule 3    verapamil  MG Oral Tab CR Take 1 tablet (120 mg total) by mouth daily. 90 tablet 3    zinc sulfate 220 (50 Zn) MG Oral Cap Take 1 capsule (220 mg total) by mouth daily.      Turmeric (QC TUMERIC COMPLEX OR) Take by mouth.      Probiotic Product (PROBIOTIC DAILY OR) Take by mouth once daily.      Cholecalciferol (VITAMIN D-3 OR) Take 15,000 Units by mouth daily.       COENZYME Q-10 OR Take 1 tablet by mouth daily.      MAGNESIUM OR Take by mouth.      Ascorbic Acid (VITAMIN C OR) Take by mouth.      Vitamin B-12 500 MCG Oral Tab Take 1 tablet (500 mcg total) by mouth daily.      aspirin 81 MG Oral Tab Take 1 tablet (81 mg total) by mouth daily.  0      Past Medical History:    Allergic rhinitis    Anxiety    Arrhythmia    Arthritis    hips    Atherosclerosis of coronary artery    Cancer (HCC)    endometrial cancer stage 1C    Cataract    Depression    Diabetes (HCC)    Disorder of thyroid    hypothyroid    Essential hypertension    Hearing impairment    [place] and has hearing aids    High blood pressure    High cholesterol    Hyperlipidemia    Hypertension    Hypothyroidism    Muscle weakness    Neuropathy    chemo    Osteoarthritis    Personal history of antineoplastic chemotherapy    currently being treated    PONV (postoperative nausea and vomiting)    Sleep apnea    Spinal stenosis    Thyroid disease    Vertigo    Visual impairment    readers      Social History:  Social History     Socioeconomic History     Marital status:    Occupational History    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[place]   Tobacco Use    Smoking status: Never    Smokeless tobacco: Never   Vaping Use    Vaping status: Never Used   Substance and Sexual Activity    Alcohol use: No    Drug use: No    Sexual activity: Not Currently   Other Topics Concern    Caffeine Concern No    Exercise Yes    Seat Belt No    Weight Concern Yes   Social History Narrative    Lives with daughter    Feels safe     Social Determinants of Health     Food Insecurity: No Food Insecurity (12/21/2023)    Food Insecurity     Food Insecurity: Never true   Transportation Needs: No Transportation Needs (12/21/2023)    Transportation Needs     Lack of Transportation: No   Housing Stability: Low Risk  (12/21/2023)    Housing Stability     Housing Instability: No        BP Readings from Last 6 Encounters:   04/23/24 124/78   04/18/24 101/70   12/22/23 127/75   12/08/23 118/82   10/13/23 (!) 158/92   10/09/23 128/82       Wt Readings from Last 6 Encounters:   04/23/24 140 lb (63.5 kg)   04/22/24 159 lb (72.1 kg)   12/21/23 159 lb (72.1 kg)   12/08/23 167 lb (75.8 kg)   10/13/23 172 lb (78 kg)   10/09/23 174 lb (78.9 kg)       REVIEW OF SYSTEMS:   GENERAL HEALTH: feels well with no complaints, fatigue and weakness   SKIN: denies any unusual skin lesions or rashes  RESPIRATORY: denies shortness of breath with exertion  CARDIOVASCULAR: denies chest pain on exertion  GI: denies abdominal pain and denies heartburn  NEURO: denies headaches    EXAM:   /78   Pulse 97   Ht 5' 9\" (1.753 m)   SpO2 98%   BMI 23.48 kg/m²  Body mass index is 23.48 kg/m².    GENERAL: well developed, well nourished, in no apparent distress   SKIN: no rashes, no suspicious lesions  HEENT: atraumatic, normocephalic, ears and throat are clear  NECK: supple, no adenopathy, no bruits  LUNGS: clear to auscultation  CARDIO: RRR without murmur  GI: good bowel sounds, no masses, HSM or  tenderness  EXTREMITIES: no cyanosis, clubbing or edema      ASSESSMENT AND PLAN:   Blanquita Hernandez is a 68 year old female presenting for a pre-operative physical.     1. Preop examination    - Patient is moderate risk for this intermediate risk procedure and medically cleared for surgery with optimization of her risk factors  - Avoid Aspirin, NSAID's, vitamins, and supplements until after surgery    2. Ankle fracture, bimalleolar, closed, right, with routine healing, subsequent encounter    - Plans ORIF on 4/26, and will increase Tramadol dose to 100 mg every 6 hours as needed   - Further pain management to come from surgeon following surgery   - traMADol 50 MG Oral Tab; Take 2 tablets (100 mg total) by mouth every 6 (six) hours as needed for Pain.  Dispense: 60 tablet; Refill: 0    3. Hypothyroidism, unspecified type    - Continue higher dose of NP thyroid 120 mg daily, and repeat TSH in 4-6 weeks     4. Type 2 diabetes mellitus with microalbuminuria (HCC)    - Continue Mounjaro 12.5 mg weekly, and will repeat A1C in August for overall well controlled Type 2 diabetes     5. Essential hypertension    - Blood pressure well controlled on current regimen    6. Mixed hyperlipidemia    - Continue Pravastatin           Aleisha Ramirez DO  04/23/24  2:12 PM

## 2024-04-23 NOTE — H&P (VIEW-ONLY)
Blanquita Hernandez is a 68 year old female.  Chief Complaint   Patient presents with    Pre-Op Exam     Fell and broke her right ankle in two places       HPI:   PREOP EXAM    PRE-OP Physical  What is the full name of procedure/ surgery? ORIF of right bimalleolar ankle fracture   Date surgery or procedure is being done? 4/26/2024  What is the doctor’s full name  that is doing the surgery? Dr. Genoveva Cano  What hospital or facility will the procedure occur? [facility]   Has been a little unsteady on her feet due to the side effects of chemotherapy, but tripped on a rug a week ago and broke her right ankle in two places.  Currently on cycle 5 out of 6 to treat her endometrial carcinoma, which was stage 1C. Did very well with the hysterectomy without any anesthesia complications. Also denies any family history of malignant hyperthermia.   She does have a lot of fatigue and pain from the chemotherapy. Taking Tramadol for pain, but it is not really helping.   Reports her blood sugars are elevated despite eating well and taking the Mounjaro, but this is likely from the steroids for chemotherapy.   ALLERGIES:  Allergies   Allergen Reactions    Amoxicillin FEVER    Sulfa Antibiotics OTHER (SEE COMMENTS)     Don't remember       Immunization History   Administered Date(s) Administered    Covid-19 Vaccine Moderna 100 mcg/0.5 ml 01/28/2021, 03/01/2021, 11/08/2021    Covid-19 Vaccine Moderna 50 Mcg/0.25 Ml 03/31/2022    Covid-19 Vaccine Moderna Bivalent 50mcg/0.5mL 12+ years 09/29/2022    FLU VAC High Dose 65 YRS & Older PRSV Free ([id number]) 02/25/2020, 12/29/2020, 10/26/2021, 09/28/2023    FLULAVAL 6 months & older 0.5 ml Prefilled syringe ([id number]) 09/10/2018    Influenza 09/01/2017    Pneumococcal (Prevnar 13) 02/25/2020    Pneumococcal Conjugate PCV20 05/30/2023    Pneumovax 23 01/05/2018    TDAP 09/10/2018    Zoster Vaccine Recombinant Adjuvanted (Shingrix) 01/15/2018, 05/05/2018, 07/15/2018, 12/15/2018       Past Surgical  History:   Procedure Laterality Date    Adenoidectomy      Bilat salpingo-oophorect w/ omentect, total abdom hyste  2023      x 3    Cataract Bilateral 2017    Dr. Perkins    Colonoscopy  2021    A few small colon polyps removed.  Scar and previous SPOT ink across from IC valve.  No residual polyp.  biopsies taken.     Colonoscopy      Endometrial ablation      Hip replacement surgery  2018    Hysterectomy  2023    Tonsillectomy      Total hip replacement Left 2017    Dr. Del Cid     Total hip replacement Right 2017    Dr. Del Cid     Family Status   Relation Status    Fa     Mo     Bro     MGMA     MGFA     PGMA     PGFA     Bro Alive    NEG (Not Specified)     Family History   Problem Relation Age of Onset    Heart Disease Father     Cancer Mother         Endometrial, liver, and uterer, colon    Endometrial Cancer Mother         treated with radiation and surgery    Colon Cancer Mother     Colon Cancer Brother     No Known Problems Maternal Grandmother         lived to age 95    Cancer Maternal Grandfather 78        leukemia    Heart Disease Paternal Grandmother     Stroke Paternal Grandmother 68    Lung Disorder Paternal Grandfather         emphysema    Breast Cancer Neg     Ovarian Cancer Neg        Current Outpatient Medications   Medication Sig Dispense Refill    polyethylene glycol, PEG 3350, 17 g Oral Powd Pack Take 17 g by mouth daily.      pravastatin 20 MG Oral Tab Take 1 tablet (20 mg total) by mouth nightly.      traMADol 50 MG Oral Tab Take 2 tablets (100 mg total) by mouth every 6 (six) hours as needed for Pain. 60 tablet 0    thyroid (NP THYROID) 120 MG Oral Tab Take 1 tablet (120 mg total) by mouth daily. 90 tablet 0    Tirzepatide (MOUNJARO) 12.5 MG/0.5ML Subcutaneous Solution Pen-injector Inject 12.5 mg into the skin once a week. (Patient taking differently: Inject 12.5 mg into the skin once a week.  Saturdays   last dose 4/20) 6 mL 0    docusate sodium 100 MG Oral Cap Take 1 capsule (100 mg total) by mouth 2 (two) times daily as needed for constipation. 60 capsule 0    metoprolol succinate ER 50 MG Oral Tablet 24 Hr Take 1 tablet (50 mg total) by mouth daily. 90 tablet 1    clobetasol 0.05 % External Ointment Apply 2 x per day for 1 week, then 1 x per day for 1 week, then 2 - 3 x per week as directed 45 g 1    FLUoxetine HCl 40 MG Oral Cap Take 1 capsule (40 mg total) by mouth daily. 90 capsule 3    verapamil  MG Oral Tab CR Take 1 tablet (120 mg total) by mouth daily. 90 tablet 3    zinc sulfate 220 (50 Zn) MG Oral Cap Take 1 capsule (220 mg total) by mouth daily.      Turmeric (QC TUMERIC COMPLEX OR) Take by mouth.      Probiotic Product (PROBIOTIC DAILY OR) Take by mouth once daily.      Cholecalciferol (VITAMIN D-3 OR) Take 15,000 Units by mouth daily.       COENZYME Q-10 OR Take 1 tablet by mouth daily.      MAGNESIUM OR Take by mouth.      Ascorbic Acid (VITAMIN C OR) Take by mouth.      Vitamin B-12 500 MCG Oral Tab Take 1 tablet (500 mcg total) by mouth daily.      aspirin 81 MG Oral Tab Take 1 tablet (81 mg total) by mouth daily.  0      Past Medical History:    Allergic rhinitis    Anxiety    Arrhythmia    Arthritis    hips    Atherosclerosis of coronary artery    Cancer (HCC)    endometrial cancer stage 1C    Cataract    Depression    Diabetes (HCC)    Disorder of thyroid    hypothyroid    Essential hypertension    Hearing impairment    [place] and has hearing aids    High blood pressure    High cholesterol    Hyperlipidemia    Hypertension    Hypothyroidism    Muscle weakness    Neuropathy    chemo    Osteoarthritis    Personal history of antineoplastic chemotherapy    currently being treated    PONV (postoperative nausea and vomiting)    Sleep apnea    Spinal stenosis    Thyroid disease    Vertigo    Visual impairment    readers      Social History:  Social History     Socioeconomic History     Marital status:    Occupational History    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[place]   Tobacco Use    Smoking status: Never    Smokeless tobacco: Never   Vaping Use    Vaping status: Never Used   Substance and Sexual Activity    Alcohol use: No    Drug use: No    Sexual activity: Not Currently   Other Topics Concern    Caffeine Concern No    Exercise Yes    Seat Belt No    Weight Concern Yes   Social History Narrative    Lives with daughter    Feels safe     Social Determinants of Health     Food Insecurity: No Food Insecurity (12/21/2023)    Food Insecurity     Food Insecurity: Never true   Transportation Needs: No Transportation Needs (12/21/2023)    Transportation Needs     Lack of Transportation: No   Housing Stability: Low Risk  (12/21/2023)    Housing Stability     Housing Instability: No        BP Readings from Last 6 Encounters:   04/23/24 124/78   04/18/24 101/70   12/22/23 127/75   12/08/23 118/82   10/13/23 (!) 158/92   10/09/23 128/82       Wt Readings from Last 6 Encounters:   04/23/24 140 lb (63.5 kg)   04/22/24 159 lb (72.1 kg)   12/21/23 159 lb (72.1 kg)   12/08/23 167 lb (75.8 kg)   10/13/23 172 lb (78 kg)   10/09/23 174 lb (78.9 kg)       REVIEW OF SYSTEMS:   GENERAL HEALTH: feels well with no complaints, fatigue and weakness   SKIN: denies any unusual skin lesions or rashes  RESPIRATORY: denies shortness of breath with exertion  CARDIOVASCULAR: denies chest pain on exertion  GI: denies abdominal pain and denies heartburn  NEURO: denies headaches    EXAM:   /78   Pulse 97   Ht 5' 9\" (1.753 m)   SpO2 98%   BMI 23.48 kg/m²  Body mass index is 23.48 kg/m².    GENERAL: well developed, well nourished, in no apparent distress   SKIN: no rashes, no suspicious lesions  HEENT: atraumatic, normocephalic, ears and throat are clear  NECK: supple, no adenopathy, no bruits  LUNGS: clear to auscultation  CARDIO: RRR without murmur  GI: good bowel sounds, no masses, HSM or  tenderness  EXTREMITIES: no cyanosis, clubbing or edema      ASSESSMENT AND PLAN:   Blanquita Hernandez is a 68 year old female presenting for a pre-operative physical.     1. Preop examination    - Patient is moderate risk for this intermediate risk procedure and medically cleared for surgery with optimization of her risk factors  - Avoid Aspirin, NSAID's, vitamins, and supplements until after surgery    2. Ankle fracture, bimalleolar, closed, right, with routine healing, subsequent encounter    - Plans ORIF on 4/26, and will increase Tramadol dose to 100 mg every 6 hours as needed   - Further pain management to come from surgeon following surgery   - traMADol 50 MG Oral Tab; Take 2 tablets (100 mg total) by mouth every 6 (six) hours as needed for Pain.  Dispense: 60 tablet; Refill: 0    3. Hypothyroidism, unspecified type    - Continue higher dose of NP thyroid 120 mg daily, and repeat TSH in 4-6 weeks     4. Type 2 diabetes mellitus with microalbuminuria (HCC)    - Continue Mounjaro 12.5 mg weekly, and will repeat A1C in August for overall well controlled Type 2 diabetes     5. Essential hypertension    - Blood pressure well controlled on current regimen    6. Mixed hyperlipidemia    - Continue Pravastatin           Aleisha Ramirez DO  04/23/24  2:12 PM

## 2024-04-23 NOTE — TELEPHONE ENCOUNTER
Outreach patient:  Spoke with the patient and call was drop, outreach patient again and LVMTCB and stated the message below.

## 2024-04-23 NOTE — TELEPHONE ENCOUNTER
Date of Surgery: 4/26/24       Post Op Appt:  5/10/24 @ 1145    Case ID: [id number]    Notes:       SURGERY SCHEDULING SHEET     Blanquita Hernandez   8/28/1955   [id number]     Procedure: Right ORIF Bimalleolar Ankle Fracture (13188)     Diagnosis: Right ankle bimalleolar fracture, closed and displaced     Anesthesia: General and Regional, adductor and popliteal if patient is amenable     Length of Surgery: 1.5 hrs     Disposition: Outpatient     Special Equipment: Synthes 3.5 mm small fragment, 4.0 mm cannulated medial malleolar screws. C-arm     Positioning: Supine     Assist: Yes Jose Luis Colorado SA     Pre-op Testing: PER ANESTHESIA GUIDELINES     Clearance: HISTORY AND PHYSICAL     Post op: 2 weeks post op     Genoveva Cano MD   [facility]   Phone: [phone]   Fax: [phone]

## 2024-04-23 NOTE — TELEPHONE ENCOUNTER
Called and spoke with Blanquita in regards to her upcoming surgery. I did confirm that surgery will take place at [facility]. I also went over the surgical protocol and scheduled her for her post op appt. She states understanding with no questions or concerns.

## 2024-04-24 ENCOUNTER — NURSE ONLY (OUTPATIENT)
Dept: LAB | Age: 69
End: 2024-04-24
Attending: ORTHOPAEDIC SURGERY
Payer: COMMERCIAL

## 2024-04-24 DIAGNOSIS — S82.841A BIMALLEOLAR ANKLE FRACTURE, RIGHT, CLOSED, INITIAL ENCOUNTER: ICD-10-CM

## 2024-04-24 PROCEDURE — 93005 ELECTROCARDIOGRAM TRACING: CPT

## 2024-04-24 PROCEDURE — 93010 ELECTROCARDIOGRAM REPORT: CPT | Performed by: INTERNAL MEDICINE

## 2024-04-25 ENCOUNTER — ANESTHESIA EVENT (OUTPATIENT)
Dept: SURGERY | Facility: HOSPITAL | Age: 69
End: 2024-04-25
Payer: COMMERCIAL

## 2024-04-25 LAB
ATRIAL RATE: 94 BPM
P AXIS: 70 DEGREES
P-R INTERVAL: 180 MS
Q-T INTERVAL: 420 MS
QRS DURATION: 150 MS
QTC CALCULATION (BEZET): 525 MS
R AXIS: -50 DEGREES
T AXIS: 109 DEGREES
VENTRICULAR RATE: 94 BPM

## 2024-04-26 ENCOUNTER — APPOINTMENT (OUTPATIENT)
Dept: GENERAL RADIOLOGY | Facility: HOSPITAL | Age: 69
End: 2024-04-26
Attending: ORTHOPAEDIC SURGERY
Payer: COMMERCIAL

## 2024-04-26 ENCOUNTER — HOSPITAL ENCOUNTER (OUTPATIENT)
Facility: HOSPITAL | Age: 69
Setting detail: HOSPITAL OUTPATIENT SURGERY
Discharge: HOME | End: 2024-04-26
Attending: ORTHOPAEDIC SURGERY | Admitting: ORTHOPAEDIC SURGERY
Payer: COMMERCIAL

## 2024-04-26 ENCOUNTER — ANESTHESIA (OUTPATIENT)
Dept: SURGERY | Facility: HOSPITAL | Age: 69
End: 2024-04-26
Payer: COMMERCIAL

## 2024-04-26 VITALS
DIASTOLIC BLOOD PRESSURE: 97 MMHG | HEIGHT: 68 IN | WEIGHT: 141.13 LBS | SYSTOLIC BLOOD PRESSURE: 120 MMHG | BODY MASS INDEX: 21.39 KG/M2 | RESPIRATION RATE: 18 BRPM | HEART RATE: 97 BPM | TEMPERATURE: 98 F | OXYGEN SATURATION: 96 %

## 2024-04-26 DIAGNOSIS — S82.841A BIMALLEOLAR ANKLE FRACTURE, RIGHT, CLOSED, INITIAL ENCOUNTER: Primary | ICD-10-CM

## 2024-04-26 LAB
GLUCOSE BLD-MCNC: 174 MG/DL (ref 70–99)
GLUCOSE BLD-MCNC: 196 MG/DL (ref 70–99)

## 2024-04-26 PROCEDURE — 76942 ECHO GUIDE FOR BIOPSY: CPT | Performed by: ANESTHESIOLOGY

## 2024-04-26 PROCEDURE — 76000 FLUOROSCOPY <1 HR PHYS/QHP: CPT | Performed by: ORTHOPAEDIC SURGERY

## 2024-04-26 PROCEDURE — 82962 GLUCOSE BLOOD TEST: CPT

## 2024-04-26 PROCEDURE — 0QSG04Z REPOSITION RIGHT TIBIA WITH INTERNAL FIXATION DEVICE, OPEN APPROACH: ICD-10-PCS | Performed by: ORTHOPAEDIC SURGERY

## 2024-04-26 PROCEDURE — 0QSJ04Z REPOSITION RIGHT FIBULA WITH INTERNAL FIXATION DEVICE, OPEN APPROACH: ICD-10-PCS | Performed by: ORTHOPAEDIC SURGERY

## 2024-04-26 DEVICE — SCREW BNE 3.5X16MM STARDRV RECESS FT LOK ST: Type: IMPLANTABLE DEVICE | Site: ANKLE | Status: FUNCTIONAL

## 2024-04-26 DEVICE — SCREW BNE 3.5X12MM CORT HEX DRV RECESS FT ST: Type: IMPLANTABLE DEVICE | Site: ANKLE | Status: FUNCTIONAL

## 2024-04-26 DEVICE — IMPLANTABLE DEVICE: Type: IMPLANTABLE DEVICE | Site: ANKLE

## 2024-04-26 DEVICE — IMPLANTABLE DEVICE: Type: IMPLANTABLE DEVICE | Site: ANKLE | Status: FUNCTIONAL

## 2024-04-26 DEVICE — K WIRE 1.25X150MM TRCR PT SS: Type: IMPLANTABLE DEVICE | Site: ANKLE

## 2024-04-26 DEVICE — SCREW BNE 3.5X14MM CORT HEX DRV RECESS FT ST: Type: IMPLANTABLE DEVICE | Site: ANKLE | Status: FUNCTIONAL

## 2024-04-26 RX ORDER — MEPERIDINE HYDROCHLORIDE 25 MG/ML
12.5 INJECTION INTRAMUSCULAR; INTRAVENOUS; SUBCUTANEOUS AS NEEDED
Status: DISCONTINUED | OUTPATIENT
Start: 2024-04-26 | End: 2024-04-26

## 2024-04-26 RX ORDER — MIDAZOLAM HYDROCHLORIDE 1 MG/ML
INJECTION INTRAMUSCULAR; INTRAVENOUS AS NEEDED
Status: DISCONTINUED | OUTPATIENT
Start: 2024-04-26 | End: 2024-04-26 | Stop reason: SURG

## 2024-04-26 RX ORDER — METOCLOPRAMIDE HYDROCHLORIDE 5 MG/ML
10 INJECTION INTRAMUSCULAR; INTRAVENOUS EVERY 8 HOURS PRN
Status: DISCONTINUED | OUTPATIENT
Start: 2024-04-26 | End: 2024-04-26

## 2024-04-26 RX ORDER — ACETAMINOPHEN 500 MG
1000 TABLET ORAL ONCE AS NEEDED
Status: DISCONTINUED | OUTPATIENT
Start: 2024-04-26 | End: 2024-04-26

## 2024-04-26 RX ORDER — HYDROCODONE BITARTRATE AND ACETAMINOPHEN 5; 325 MG/1; MG/1
1 TABLET ORAL ONCE AS NEEDED
Status: DISCONTINUED | OUTPATIENT
Start: 2024-04-26 | End: 2024-04-26

## 2024-04-26 RX ORDER — SODIUM CHLORIDE, SODIUM LACTATE, POTASSIUM CHLORIDE, CALCIUM CHLORIDE 600; 310; 30; 20 MG/100ML; MG/100ML; MG/100ML; MG/100ML
INJECTION, SOLUTION INTRAVENOUS CONTINUOUS
Status: DISCONTINUED | OUTPATIENT
Start: 2024-04-26 | End: 2024-04-26

## 2024-04-26 RX ORDER — TRANEXAMIC ACID 10 MG/ML
INJECTION, SOLUTION INTRAVENOUS AS NEEDED
Status: DISCONTINUED | OUTPATIENT
Start: 2024-04-26 | End: 2024-04-26 | Stop reason: SURG

## 2024-04-26 RX ORDER — HYDROMORPHONE HYDROCHLORIDE 1 MG/ML
0.4 INJECTION, SOLUTION INTRAMUSCULAR; INTRAVENOUS; SUBCUTANEOUS EVERY 5 MIN PRN
Status: DISCONTINUED | OUTPATIENT
Start: 2024-04-26 | End: 2024-04-26

## 2024-04-26 RX ORDER — ONDANSETRON 2 MG/ML
4 INJECTION INTRAMUSCULAR; INTRAVENOUS EVERY 6 HOURS PRN
Status: DISCONTINUED | OUTPATIENT
Start: 2024-04-26 | End: 2024-04-26

## 2024-04-26 RX ORDER — PHENYLEPHRINE HCL 10 MG/ML
VIAL (ML) INJECTION AS NEEDED
Status: DISCONTINUED | OUTPATIENT
Start: 2024-04-26 | End: 2024-04-26 | Stop reason: SURG

## 2024-04-26 RX ORDER — NICOTINE POLACRILEX 4 MG
30 LOZENGE BUCCAL
Status: DISCONTINUED | OUTPATIENT
Start: 2024-04-26 | End: 2024-04-26 | Stop reason: HOSPADM

## 2024-04-26 RX ORDER — HYDROMORPHONE HYDROCHLORIDE 1 MG/ML
0.2 INJECTION, SOLUTION INTRAMUSCULAR; INTRAVENOUS; SUBCUTANEOUS EVERY 5 MIN PRN
Status: DISCONTINUED | OUTPATIENT
Start: 2024-04-26 | End: 2024-04-26

## 2024-04-26 RX ORDER — HYDROMORPHONE HYDROCHLORIDE 1 MG/ML
0.6 INJECTION, SOLUTION INTRAMUSCULAR; INTRAVENOUS; SUBCUTANEOUS EVERY 5 MIN PRN
Status: DISCONTINUED | OUTPATIENT
Start: 2024-04-26 | End: 2024-04-26

## 2024-04-26 RX ORDER — ACETAMINOPHEN 500 MG
1000 TABLET ORAL ONCE
Status: DISCONTINUED | OUTPATIENT
Start: 2024-04-26 | End: 2024-04-26 | Stop reason: HOSPADM

## 2024-04-26 RX ORDER — HYDROCODONE BITARTRATE AND ACETAMINOPHEN 5; 325 MG/1; MG/1
2 TABLET ORAL ONCE AS NEEDED
Status: DISCONTINUED | OUTPATIENT
Start: 2024-04-26 | End: 2024-04-26

## 2024-04-26 RX ORDER — ONDANSETRON 2 MG/ML
INJECTION INTRAMUSCULAR; INTRAVENOUS AS NEEDED
Status: DISCONTINUED | OUTPATIENT
Start: 2024-04-26 | End: 2024-04-26 | Stop reason: SURG

## 2024-04-26 RX ORDER — MIDAZOLAM HYDROCHLORIDE 1 MG/ML
1 INJECTION INTRAMUSCULAR; INTRAVENOUS EVERY 5 MIN PRN
Status: DISCONTINUED | OUTPATIENT
Start: 2024-04-26 | End: 2024-04-26

## 2024-04-26 RX ORDER — INSULIN ASPART 100 [IU]/ML
INJECTION, SOLUTION INTRAVENOUS; SUBCUTANEOUS ONCE
Status: DISCONTINUED | OUTPATIENT
Start: 2024-04-26 | End: 2024-04-26

## 2024-04-26 RX ORDER — DEXAMETHASONE SODIUM PHOSPHATE 10 MG/ML
INJECTION, SOLUTION INTRAMUSCULAR; INTRAVENOUS AS NEEDED
Status: DISCONTINUED | OUTPATIENT
Start: 2024-04-26 | End: 2024-04-26 | Stop reason: SURG

## 2024-04-26 RX ORDER — DIPHENHYDRAMINE HYDROCHLORIDE 50 MG/ML
12.5 INJECTION INTRAMUSCULAR; INTRAVENOUS AS NEEDED
Status: DISCONTINUED | OUTPATIENT
Start: 2024-04-26 | End: 2024-04-26

## 2024-04-26 RX ORDER — DEXTROSE MONOHYDRATE 25 G/50ML
50 INJECTION, SOLUTION INTRAVENOUS
Status: DISCONTINUED | OUTPATIENT
Start: 2024-04-26 | End: 2024-04-26 | Stop reason: HOSPADM

## 2024-04-26 RX ORDER — CEFAZOLIN SODIUM/WATER 2 G/20 ML
SYRINGE (ML) INTRAVENOUS
Status: COMPLETED
Start: 2024-04-26 | End: 2024-04-26

## 2024-04-26 RX ORDER — DEXAMETHASONE SODIUM PHOSPHATE 4 MG/ML
VIAL (ML) INJECTION AS NEEDED
Status: DISCONTINUED | OUTPATIENT
Start: 2024-04-26 | End: 2024-04-26 | Stop reason: SURG

## 2024-04-26 RX ORDER — CEFAZOLIN SODIUM/WATER 2 G/20 ML
2 SYRINGE (ML) INTRAVENOUS ONCE
Status: COMPLETED | OUTPATIENT
Start: 2024-04-26 | End: 2024-04-26

## 2024-04-26 RX ORDER — NICOTINE POLACRILEX 4 MG
15 LOZENGE BUCCAL
Status: DISCONTINUED | OUTPATIENT
Start: 2024-04-26 | End: 2024-04-26 | Stop reason: HOSPADM

## 2024-04-26 RX ORDER — METOPROLOL TARTRATE 1 MG/ML
2.5 INJECTION, SOLUTION INTRAVENOUS ONCE
Status: DISCONTINUED | OUTPATIENT
Start: 2024-04-26 | End: 2024-04-26

## 2024-04-26 RX ORDER — LIDOCAINE HYDROCHLORIDE 10 MG/ML
INJECTION, SOLUTION EPIDURAL; INFILTRATION; INTRACAUDAL; PERINEURAL AS NEEDED
Status: DISCONTINUED | OUTPATIENT
Start: 2024-04-26 | End: 2024-04-26 | Stop reason: SURG

## 2024-04-26 RX ORDER — NALOXONE HYDROCHLORIDE 0.4 MG/ML
80 INJECTION, SOLUTION INTRAMUSCULAR; INTRAVENOUS; SUBCUTANEOUS AS NEEDED
Status: DISCONTINUED | OUTPATIENT
Start: 2024-04-26 | End: 2024-04-26

## 2024-04-26 RX ORDER — TRAMADOL HYDROCHLORIDE 50 MG/1
TABLET ORAL EVERY 6 HOURS PRN
Qty: 30 TABLET | Refills: 0 | Status: SHIPPED | OUTPATIENT
Start: 2024-04-26

## 2024-04-26 RX ADMIN — PHENYLEPHRINE HCL 100 MCG: 10 MG/ML VIAL (ML) INJECTION at 12:14:00

## 2024-04-26 RX ADMIN — TRANEXAMIC ACID 1000 MG: 10 INJECTION, SOLUTION INTRAVENOUS at 11:05:00

## 2024-04-26 RX ADMIN — DEXAMETHASONE SODIUM PHOSPHATE 4 MG: 4 MG/ML VIAL (ML) INJECTION at 11:08:00

## 2024-04-26 RX ADMIN — DEXAMETHASONE SODIUM PHOSPHATE 2 MG: 10 INJECTION, SOLUTION INTRAMUSCULAR; INTRAVENOUS at 10:59:00

## 2024-04-26 RX ADMIN — DEXAMETHASONE SODIUM PHOSPHATE 2 MG: 10 INJECTION, SOLUTION INTRAMUSCULAR; INTRAVENOUS at 10:54:00

## 2024-04-26 RX ADMIN — PHENYLEPHRINE HCL 100 MCG: 10 MG/ML VIAL (ML) INJECTION at 11:56:00

## 2024-04-26 RX ADMIN — PHENYLEPHRINE HCL 100 MCG: 10 MG/ML VIAL (ML) INJECTION at 11:08:00

## 2024-04-26 RX ADMIN — PHENYLEPHRINE HCL 100 MCG: 10 MG/ML VIAL (ML) INJECTION at 11:40:00

## 2024-04-26 RX ADMIN — LIDOCAINE HYDROCHLORIDE 50 MG: 10 INJECTION, SOLUTION EPIDURAL; INFILTRATION; INTRACAUDAL; PERINEURAL at 10:47:00

## 2024-04-26 RX ADMIN — SODIUM CHLORIDE, SODIUM LACTATE, POTASSIUM CHLORIDE, CALCIUM CHLORIDE: 600; 310; 30; 20 INJECTION, SOLUTION INTRAVENOUS at 13:38:00

## 2024-04-26 RX ADMIN — PHENYLEPHRINE HCL 100 MCG: 10 MG/ML VIAL (ML) INJECTION at 11:23:00

## 2024-04-26 RX ADMIN — CEFAZOLIN SODIUM/WATER 2 G: 2 G/20 ML SYRINGE (ML) INTRAVENOUS at 10:49:00

## 2024-04-26 RX ADMIN — ONDANSETRON 4 MG: 2 INJECTION INTRAMUSCULAR; INTRAVENOUS at 12:58:00

## 2024-04-26 RX ADMIN — PHENYLEPHRINE HCL 100 MCG: 10 MG/ML VIAL (ML) INJECTION at 11:04:00

## 2024-04-26 NOTE — ANESTHESIA PROCEDURE NOTES
Regional Block    Date/Time: 4/26/2024 10:50 AM    Performed by: Akil Koo MD  Authorized by: Akil Koo MD      General Information and Staff    Start Time:  4/26/2024 10:50 AM  End Time:  4/26/2024 10:54 AM  Anesthesiologist:  Akil Koo MD  Performed by:  Anesthesiologist  Patient Location:  OR    Block Placement: Post Induction  Site Identification: real time ultrasound guided, nerve stimulator and image stored and retrievable    Block site/laterality marked before start: site marked  Reason for Block: at surgeon's request and post-op pain management    Preanesthetic Checklist: 2 patient identifers, IV checked, site marked, risks and benefits discussed, monitors and equipment checked, pre-op evaluation, timeout performed, anesthesia consent, sterile technique used, no prohibitive neurological deficits and no local skin infection at insertion site      Procedure Details    Patient Position:  Supine  Prep: ChloraPrep    Monitoring:  Cardiac monitor, continuous pulse ox and blood pressure cuff  Block Type:  Popliteal  Laterality:  Right  Injection Technique:  Single-shot    Needle    Needle Type:  Short-bevel and echogenic  Needle Gauge:  21 G  Needle Length:  100 mm  Needle Localization:  Ultrasound guidance and nerve stimulator  Reason for Ultrasound Use: appropriate spread of the medication was noted in real time and no ultrasound evidence of intravascular and/or intraneural injection    Nerve Stimulator: 0.6 amps        Assessment    Injection Assessment:  Good spread noted, negative resistance, negative aspiration for heme, incremental injection, low pressure, local visualized surrounding nerve on ultrasound and no pain on injection  Heart Rate Change: No    - Patient tolerated block procedure well without evidence of immediate block related complications.     Medications  4/26/2024 10:50 AM      Additional Comments    Medication:  Ropivacaine 0.375% 25mL + decadron PF  2mg

## 2024-04-26 NOTE — BRIEF OP NOTE
Pre-Operative Diagnosis: Bimalleolar ankle fracture, right, closed, initial encounter [S82.841A]     Post-Operative Diagnosis: Bimalleolar ankle fracture, right, closed, initial encounter [S82.841A]      Procedure Performed:   OPEN REDUCTION INTERNAL FIXATION OF RIGHT BIMALLEOLAR ANKLE FRACTURE    Surgeons and Role:     * Genoveva Cano MD - Primary    Assistant(s):  Surgical Assistant.: Jose Luis Colorado     Surgical Findings: See post-op     Specimen: None     Estimated Blood Loss: Blood Output: 25 mL (4/26/2024  1:25 PM)    Genoveva Cano MD  4/26/2024  1:47 PM

## 2024-04-26 NOTE — DISCHARGE INSTRUCTIONS
Keep splint clean/dry with waterproof cover for sit down showers.    Elevate at heart level or slightly above for the first 3-5 days.    Non-weight bearing right LEG, but may touch down when seated.

## 2024-04-26 NOTE — ANESTHESIA PROCEDURE NOTES
Airway  Date/Time: 4/26/2024 10:49 AM  Urgency: elective    Airway not difficult    General Information and Staff    Patient location during procedure: OR  Anesthesiologist: Akil Koo MD  Performed: anesthesiologist   Performed by: Akil Koo MD  Authorized by: Akil Koo MD      Indications and Patient Condition  Indications for airway management: anesthesia  Sedation level: deep  Preoxygenated: yes  Patient position: sniffing  Mask difficulty assessment: 1 - vent by mask    Final Airway Details  Final airway type: supraglottic airway      Successful airway: ProSeal  Size 3       Number of attempts at approach: 1

## 2024-04-26 NOTE — ANESTHESIA PREPROCEDURE EVALUATION
PRE-OP EVALUATION    Patient Name: Blanquita BAUGH     Admit Diagnosis: Bimalleolar ankle fracture, right, closed, initial encounter [S82.850I]    Pre-op Diagnosis: Bimalleolar ankle fracture, right, closed, initial encounter [S88.941U]    OPEN REDUCTION INTERNAL FIXATION OF RIGHT BIMALLEOLAR ANKLE FRACTURE    Anesthesia Procedure: OPEN REDUCTION INTERNAL FIXATION OF RIGHT BIMALLEOLAR ANKLE FRACTURE (Right)    Surgeons and Role:     * Genoveva Cano MD - Primary    Pre-op vitals reviewed.        Body mass index is 21.29 kg/m².    Current medications reviewed.  Hospital Medications:   acetaminophen (Tylenol Extra Strength) tab 1,000 mg  1,000 mg Oral Once    glucose (Dex4) 15 GM/59ML oral liquid 15 g  15 g Oral Q15 Min PRN    Or    glucose (Glutose) 40% oral gel 15 g  15 g Oral Q15 Min PRN    Or    glucose-vitamin C (Dex-4) chewable tab 4 tablet  4 tablet Oral Q15 Min PRN    Or    dextrose 50% injection 50 mL  50 mL Intravenous Q15 Min PRN    Or    glucose (Dex4) 15 GM/59ML oral liquid 30 g  30 g Oral Q15 Min PRN    Or    glucose (Glutose) 40% oral gel 30 g  30 g Oral Q15 Min PRN    Or    glucose-vitamin C (Dex-4) chewable tab 8 tablet  8 tablet Oral Q15 Min PRN    lactated ringers infusion   Intravenous Continuous    ceFAZolin (Ancef) 2 g in 20mL IV syringe premix  2 g Intravenous Once       Outpatient Medications:     Medications Prior to Admission   Medication Sig Dispense Refill Last Dose    polyethylene glycol, PEG 3350, 17 g Oral Powd Pack Take 17 g by mouth daily.   Past Week    pravastatin 20 MG Oral Tab Take 1 tablet (20 mg total) by mouth nightly.       traMADol 50 MG Oral Tab Take 2 tablets (100 mg total) by mouth every 6 (six) hours as needed for Pain. 60 tablet 0     thyroid (NP THYROID) 120 MG Oral Tab Take 1 tablet (120 mg total) by mouth daily. 90 tablet 0     Tirzepatide (MOUNJARO) 12.5 MG/0.5ML Subcutaneous Solution Pen-injector Inject 12.5 mg into the skin once a week. (Patient taking differently:  Inject 12.5 mg into the skin once a week. Saturdays   last dose 4/20) 6 mL 0     docusate sodium 100 MG Oral Cap Take 1 capsule (100 mg total) by mouth 2 (two) times daily as needed for constipation. 60 capsule 0 Past Week    metoprolol succinate ER 50 MG Oral Tablet 24 Hr Take 1 tablet (50 mg total) by mouth daily. 90 tablet 1     clobetasol 0.05 % External Ointment Apply 2 x per day for 1 week, then 1 x per day for 1 week, then 2 - 3 x per week as directed 45 g 1     FLUoxetine HCl 40 MG Oral Cap Take 1 capsule (40 mg total) by mouth daily. 90 capsule 3     verapamil  MG Oral Tab CR Take 1 tablet (120 mg total) by mouth daily. 90 tablet 3     zinc sulfate 220 (50 Zn) MG Oral Cap Take 1 capsule (220 mg total) by mouth daily.   Past Week    Turmeric (QC TUMERIC COMPLEX OR) Take by mouth.   Past Week    Probiotic Product (PROBIOTIC DAILY OR) Take by mouth once daily.   Past Week    Cholecalciferol (VITAMIN D-3 OR) Take 15,000 Units by mouth daily.    Past Week    aspirin 81 MG Oral Tab Take 1 tablet (81 mg total) by mouth daily.  0 Past Week    COENZYME Q-10 OR Take 1 tablet by mouth daily.   Past Week    MAGNESIUM OR Take by mouth.   Past Week    Ascorbic Acid (VITAMIN C OR) Take by mouth.   Past Week    Vitamin B-12 500 MCG Oral Tab Take 1 tablet (500 mcg total) by mouth daily.   Past Week       Allergies: Amoxicillin and Sulfa antibiotics      Anesthesia Evaluation    Patient summary reviewed.    Anesthetic Complications  (+) history of anesthetic complications  History of: PONV       GI/Hepatic/Renal                (+) liver disease                 Cardiovascular  Comment: LBBB    ECG reviewed.            (+) hypertension   (+) hyperlipidemia  (+) CAD             (+) dysrhythmias                   Endo/Other      (+) diabetes     (+) hypothyroidism                       Pulmonary                    (+) sleep apnea       Neuro/Psych      (+) depression           (+) neuromuscular disease                      Past Surgical History:   Procedure Laterality Date    Adenoidectomy      Bilat salpingo-oophorect w/ omentect, total abdom hyste  2023      x 3    Cataract Bilateral 2017    Dr. Perkins    Colonoscopy  2021    A few small colon polyps removed.  Scar and previous SPOT ink across from IC valve.  No residual polyp.  biopsies taken.     Colonoscopy      Endometrial ablation  2009    Hip replacement surgery  2018    Hysterectomy  2023    Tonsillectomy      Total hip replacement Left 2017    Dr. Del Cid     Total hip replacement Right 2017    Dr. 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  Lab Results   Component Value Date    WBC 7.2 2024    RBC 3.96 2024    HGB 12.2 2024    HCT 38.0 2024    MCV 96.0 2024    MCH 30.8 2024    MCHC 32.1 2024    RDW 17.3 (H) 2024    .0 2024     Lab Results   Component Value Date     2024    K 4.3 2024     2024    CO2 24.0 2024    BUN 18 2024    CREATSERUM 0.63 2024     (H) 2024    CA 9.9 2024            Airway      Mallampati: II  Mouth opening: 3 FB  TM distance: 4 - 6 cm  Neck ROM: full Cardiovascular      Rhythm: regular  Rate: normal     Dental  Comment: No loose teeth reported by patient           Pulmonary      Breath sounds clear to auscultation bilaterally.               Other findings              ASA: 3   Plan:  general and regional  NPO status verified and patient meets guidelines.    Post-procedure pain management plan discussed with surgeon and patient.  Surgeon requests: regional block  Comment: Discussed general anesthesia with endotracheal tube/supraglottic airway with patient. Discussed risk of oral/dental trauma, nausea, rare allergic reactions. Patient understands the risks, benefits, and requirement for anesthesia and willing to proceed. Consent signed.      The risks and benefits of regional anesthesia including, but not limited to bleeding, infection, paresthesias, nerve damage, and incomplete block have been explained to the patient as indicated on the anesthesia consent form, which has been signed.      Plan/risks discussed with: patient                Present on Admission:  **None**

## 2024-04-26 NOTE — ANESTHESIA POSTPROCEDURE EVALUATION
[facility] Patient Status:  Hospital Outpatient Surgery   Age/Gender 68 year old female MRN [id number]   Location [facility] SURGERY Attending Genoveva Cano MD   Hosp Day # 0 PCP Aleisha Ramirez DO       Anesthesia Post-op Note    OPEN REDUCTION INTERNAL FIXATION OF RIGHT BIMALLEOLAR ANKLE FRACTURE    Procedure Summary       Date: 04/26/24 Room / Location:  MAIN OR 03 / [facility] MAIN OR    Anesthesia Start: 1043 Anesthesia Stop: 1338    Procedure: OPEN REDUCTION INTERNAL FIXATION OF RIGHT BIMALLEOLAR ANKLE FRACTURE (Right: Ankle) Diagnosis:       Bimalleolar ankle fracture, right, closed, initial encounter      (Bimalleolar ankle fracture, right, closed, initial encounter [S82.841A])    Surgeons: Genoveva Cano MD Anesthesiologist: Akil Koo MD    Anesthesia Type: general, regional ASA Status: 3            Anesthesia Type: general, regional    Vitals Value Taken Time   /8 04/26/24 1338   Temp 99.4 04/26/24 1338   Pulse 88 04/26/24 1338   Resp 12 04/26/24 1338   SpO2 94 04/26/24 1338       Patient Location: PACU    Anesthesia Type: general and regional    Airway Patency: patent    Postop Pain Control: adequate    Mental Status: mildly sedated but able to meaningfully participate in the post-anesthesia evaluation    Nausea/Vomiting: none    Cardiopulmonary/Hydration status: stable euvolemic    Complications: no apparent anesthesia related complications    Postop vital signs: stable    Dental Exam: Unchanged from Preop    Patient to be discharged from PACU when criteria met.

## 2024-04-26 NOTE — OPERATIVE REPORT
[facility] OPERATIVE NOTE    PATIENT'S NAME: Blanquita BAUGH    ATTENDING PHYSICIAN: Genoveva Cano MD   OPERATING PHYSICIAN: Genoveva Cano MD   [facility]#:   [id number]  MEDICAL RECORD #:   [id number]    YOB: 1955  ADMISSION DATE:       4/26/2024     OPERATION DATE:  4/26/2024    Preoperative Diagnosis: Bimalleolar ankle fracture, right, closed, initial encounter [S82.841A]    Postoperative Diagnosis: Bimalleolar ankle fracture, right, closed, initial encounter [S82.841A]    Procedures Performed: OPEN REDUCTION INTERNAL FIXATION OF RIGHT BIMALLEOLAR ANKLE FRACTURE    Primary Surgeon: Genoveva Cano MD     Assistant: Surgical Assistant.: Jose Luis Colorado    Surgical Findings: See post-op    Anesthesia: General    Complications: none    Specimen: none    Drains: none    Condition: stable    Estimated Blood Loss: 25 ml    Tourniquet Time:  Approximately 120 min     IMPLANTS:  Synthes 1/3 semitubular plate, 6-hole with appropriate cortical/cancellous/locking screws x 6; Synthes 4.0 mm cannulated medial malleolar screws with washers x 2.     INDICATIONS:  The patient is a 68 year old female who sustained a severe fracture of the left ankle approximately 10 days ago when she twisted awkwardly from a trip on an area rug.  Immediate pain, deformity and swelling was noted and the patient was taken to the local urgent care.  The patient was diagnosed with a closed bimalleolar ankle fracture with mild displacement.  I initially saw the patient on 4/22/2024 and confirmed the diagnosis.  Given the unstable nature of the injury, my recommendation was for ORIF of the distal fibula and the medial malleolus.  I also suggested we may need to perform syndesmotic fixation if the mortise was unstable after initial fixation.  Risks, benefits and alternatives were discussed including but not limited to possible infection, bleeding, neurovascular injury as well as postop stiffness, continued pain, hardware complications, delayed  union, malunion, or nonunion.  Posttraumatic arthrosis is always a risk with any severe fracture with intra-articular involvement.  Risks of anesthesia were also reviewed including but not limited to possible cardiac, pulmonary, or cerebrovascular complications, any of which could be life-threatening.  Pre-operative medical clearance for anesthesia was obtained.  The patient verbalized understanding and agreement and gave informed consent.    OPERATIVE TECHNIQUE:  Patient was identified in the preop holding area where the right lower extremity was initialed.  Informed consent was confirmed and the patient was taken to the operating room.  The patient was placed supine on the operating table with all bony prominences well padded.  Anesthesia then administered regional popliteal and adductor blocks followed by general LMA anesthesia without complication.  The patient was then placed in a well-padded proximal thigh tourniquet preset to 250 mmHg.  The patient was then positioned supine with all bony prominences well-padded and a gel pad bump underneath the operative gluteal region to internally rotate the extremity slightly.  This allowed for good access to the posterior lateral aspect of the ankle.  Bone foam was also used to support the extremity.  All bony prominences were well padded, and the entire surgical lower extremity was prepped and draped to the proximal tourniquet in the usual sterile fashion.     After confirming consent, side, and prophylactic antibiotics with an appropriate time-out, the operative lower extremity was exsanguinated and tourniquet was elevated.  An 8-10 cm longitudinal incision was marked beginning at the distal lateral malleolus, extending proximally along its shaft.  This was incised with a 10-blade, followed by blunt dissection proximally and sharp dissection distally.  The posterolateral approach to the distal fibula was continued and there was a displaced distal fibular short  posterior oblique fracture consistent with the preop imaging.  After clearing hematoma and assessing the fracture configuration, open reduction was carried out with traction and use of Synthes reduction clamps and maintained by our surgical assistant.  I also used an anterior to posterior K wire for provisional stabilization.  Bone density was poor and the reduction was somewhat challenging to maintain.  Once I felt adequate reduction was achieved, image intensification was brought in to confirm.  I then selected an 6-hole-hole 1/3 semitubular plate which was placed on the posterior lateral aspect of the distal fibula.  Based on the configuration of the diaphyseal primary fracture line, proximal fixation provided an antiglide reduction of the fracture at the diaphysis.  A bicortical screw was therefore placed proximally with the distal fragments held in place by the K wires and clamps as needed.  Image intensification confirmed ideal placement of the plate.  Bicortical screws were placed proximally engaging 6 cortices.  Distally, a cancellous screw was used taking great care to ensure they were outside the syndesmotic cartilage using image intensification.  2 additional distal locking screws were placed due to the comminution and soft quality of the metaphyseal bone.  Clamps were removed and the K wire was removed.  We were extremely satisfied with the construct, hardware position and the anatomic reduction of the fibular fracture considering the degree of comminution and poor quality of her bone.  AP, mortise and lateral views confirmed ideal placement of the hardware.  Wound was copiously irrigated and a moist sponge was placed.  Attention was then drawn to the medial malleolus.     A curvilinear incision was made anterior to the distal tip of the medial malleolus.  There was a short oblique medial malleolar fracture which was angled away from and proximal to the tibial plafond.  Skin and dermal tissue were  incised, followed by blunt dissection of the subcutaneous layer until periosteum was identified.  Branches of the saphenous nerve and vein were meticulously protected throughout the case with blunt dissection and blunt retraction.  The interdigitating of periosteum was removed from the fracture site.  Copious irrigation of the joint was performed.  The articular cartilage and the posterior tibial tendon were both visibly intact.  Anatomic reduction was then performed using a dental pick and manual compression.  I then passed a guide pin from the 4.0 mm cannulated Synthes screws.  A second parallel pin was passed after image intensification confirmed ideal extra-articular position.  They were measured, drilled, and appropriate length partially threaded screws were placed.  Image intensification demonstrated that the anterior screw was somewhat proximal and did not leave much of a bone bridge and very soft bone.  I therefore elected to position the screw posteriorly.  In a similar fashion a guidepin was passed parallel and extra-articular to the central screw.  A more robust bony bridge was confirmed.  An appropriate sized screw was therefore drilled and placed with all the threads proximal to the fracture line.  Again bone density was poor and there was some cortical compression upon tightening of the screws with washers augmenting the head fixation.  Despite the soft bone I was satisfied with the anatomic reduction and stability of the construct.  Final AP lateral and mortise views were obtained.  A live stress Cotton and gentle external rotation view confirmed a stable mortise without widening.  Wounds were copiously irrigated and hemostasis was achieved with electrocautery.  Lateral wound was closed in layers with 2-0, followed by 3-0 Vicryl, while the medial wound was closed with 3-0 Vicryl for the dermis.  Staples were placed followed by a sterile nonadhesive dressing using Xeroform medicated gauze, 4x4s, ABDs,  and sterile Webril.  The leg was placed in a short-leg posterior splint with a sugar-tong component.  Compressive Ace wraps were applied.  The patient was awoken without complication and taken to the postanesthesia recovery room in stable condition.  All sponge, needle, and instrument counts were correct.  Dedicated assistance from Jose Luis Colorado SA, was critical for patient set up, retraction, assistance with placement of the hardware, and assistance with wound closure and application of the splint.      Genoveva Cano MD, [facility] Department of Orthopaedics  Phone [phone]  Fax [phone]      4/26/2024  2:00 PM

## 2024-04-26 NOTE — ANESTHESIA PROCEDURE NOTES
Regional Block    Date/Time: 4/26/2024 10:55 AM    Performed by: Akil oKo MD  Authorized by: Akil Koo MD      General Information and Staff    Start Time:  4/26/2024 10:55 AM  End Time:  4/26/2024 10:59 AM  Anesthesiologist:  Akil Koo MD  Performed by:  Anesthesiologist  Patient Location:  OR    Block Placement: Post Induction  Site Identification: real time ultrasound guided and image stored and retrievable    Block site/laterality marked before start: site marked  Reason for Block: at surgeon's request and post-op pain management    Preanesthetic Checklist: 2 patient identifers, IV checked, site marked, risks and benefits discussed, monitors and equipment checked, pre-op evaluation, timeout performed, anesthesia consent, sterile technique used, no prohibitive neurological deficits and no local skin infection at insertion site      Procedure Details    Patient Position:  Supine  Prep: ChloraPrep    Monitoring:  Cardiac monitor, continuous pulse ox and blood pressure cuff  Block Type:  Adductor canal  Laterality:  Right  Injection Technique:  Single-shot    Needle    Needle Type:  Short-bevel and echogenic  Needle Gauge:  21 G  Needle Length:  100 mm  Needle Localization:  Ultrasound guidance  Reason for Ultrasound Use: appropriate spread of the medication was noted in real time and no ultrasound evidence of intravascular and/or intraneural injection            Assessment    Injection Assessment:  Good spread noted, negative resistance, negative aspiration for heme, incremental injection and low pressure  Heart Rate Change: No    - Patient tolerated block procedure well without evidence of immediate block related complications.     Medications  4/26/2024 10:55 AM      Additional Comments    Medication:  Ropivacaine 0.25% 20mL + decadron PF 2mg

## 2024-05-09 ENCOUNTER — TELEPHONE (OUTPATIENT)
Dept: ORTHOPEDICS CLINIC | Facility: CLINIC | Age: 69
End: 2024-05-09

## 2024-05-09 DIAGNOSIS — Z98.890 STATUS POST ORIF OF FRACTURE OF ANKLE: Primary | ICD-10-CM

## 2024-05-09 DIAGNOSIS — Z87.81 STATUS POST ORIF OF FRACTURE OF ANKLE: Primary | ICD-10-CM

## 2024-05-09 NOTE — TELEPHONE ENCOUNTER
XR ordered per ortho protocol. XR scheduled and patient was notified via [employer] to let them know that they should arrive 15-20 minutes early, in order for them to complete imaging.

## 2024-05-14 ENCOUNTER — TELEPHONE (OUTPATIENT)
Dept: ORTHOPEDICS CLINIC | Facility: CLINIC | Age: 69
End: 2024-05-14

## 2024-05-14 NOTE — TELEPHONE ENCOUNTER
R/S patient for tomorrow per . Patient notified via telephone call and made aware to arrive early for splint removal and XR.

## 2024-05-15 ENCOUNTER — HOSPITAL ENCOUNTER (OUTPATIENT)
Dept: GENERAL RADIOLOGY | Age: 69
Discharge: HOME | End: 2024-05-15
Attending: ORTHOPAEDIC SURGERY

## 2024-05-15 ENCOUNTER — OFFICE VISIT (OUTPATIENT)
Dept: ORTHOPEDICS CLINIC | Facility: CLINIC | Age: 69
End: 2024-05-15

## 2024-05-15 VITALS — HEIGHT: 68 IN | BODY MASS INDEX: 21.37 KG/M2 | WEIGHT: 141 LBS

## 2024-05-15 DIAGNOSIS — Z87.81 STATUS POST ORIF OF FRACTURE OF ANKLE: ICD-10-CM

## 2024-05-15 DIAGNOSIS — Z98.890 STATUS POST ORIF OF FRACTURE OF ANKLE: ICD-10-CM

## 2024-05-15 DIAGNOSIS — Z87.81 S/P ORIF (OPEN REDUCTION INTERNAL FIXATION) FRACTURE: ICD-10-CM

## 2024-05-15 DIAGNOSIS — Z98.890 S/P ORIF (OPEN REDUCTION INTERNAL FIXATION) FRACTURE: ICD-10-CM

## 2024-05-15 DIAGNOSIS — Z48.89 AFTERCARE FOLLOWING SURGERY: Primary | ICD-10-CM

## 2024-05-15 PROCEDURE — 73610 X-RAY EXAM OF ANKLE: CPT | Performed by: ORTHOPAEDIC SURGERY

## 2024-05-15 PROCEDURE — 3008F BODY MASS INDEX DOCD: CPT | Performed by: PODIATRIST

## 2024-05-15 PROCEDURE — 99203 OFFICE O/P NEW LOW 30 MIN: CPT | Performed by: PODIATRIST

## 2024-05-15 NOTE — PROGRESS NOTES
[facility] New Patient Note    CC:   Chief Complaint   Patient presents with    Post-Op     3 week post op right ankle fx        HPI: The patient is a 68 year old female who presents today almost 3 weeks post op ankle fracture and ORIF with Dr Genoveva Cano.  I am seeing the patient for her post operative visit today.   He has maintain nonweightbearing status with the use of a wheelchair and has kept her splint intact.  Pain is well-controlled with tramadol and she is hoping to have another prescription for that today.  She also is inquiring about her last chemotherapy treatment for her oncologist        Past Medical History:    Allergic rhinitis    Anxiety    Arrhythmia    Arthritis    hips    Atherosclerosis of coronary artery    Cancer (HCC)    endometrial cancer stage 1C    Cataract    Depression    Diabetes (HCC)    Disorder of thyroid    hypothyroid    Essential hypertension    Hearing impairment    Koi and has hearing aids    High blood pressure    High cholesterol    Hyperlipidemia    Hypertension    Hypothyroidism    Muscle weakness    Neuropathy    chemo    Osteoarthritis    Personal history of antineoplastic chemotherapy    currently being treated    PONV (postoperative nausea and vomiting)    Sleep apnea    Spinal stenosis    Thyroid disease    Vertigo    Visual impairment    readers     Past Surgical History:   Procedure Laterality Date    Adenoidectomy      Bilat salpingo-oophorect w/ omentect, total abdom hyste  2023      x 3    Cataract Bilateral 2017    Dr. Perkins    Colonoscopy  2021    A few small colon polyps removed.  Scar and previous SPOT ink across from IC valve.  No residual polyp.  biopsies taken.     Colonoscopy      Endometrial ablation  2009    Hip replacement surgery  2018    Hysterectomy  2023    Tonsillectomy      Total hip replacement Left 2017    Dr. Del Cid     Total hip replacement Right 2017    Dr. Del Cid     Current Outpatient  Medications   Medication Sig Dispense Refill    traMADol 50 MG Oral Tab Take 1-2 tablets ( mg total) by mouth every 6 (six) hours as needed for Pain (pain). 30 tablet 0    polyethylene glycol, PEG 3350, 17 g Oral Powd Pack Take 17 g by mouth daily.      pravastatin 20 MG Oral Tab Take 1 tablet (20 mg total) by mouth nightly.      traMADol 50 MG Oral Tab Take 2 tablets (100 mg total) by mouth every 6 (six) hours as needed for Pain. 60 tablet 0    thyroid (NP THYROID) 120 MG Oral Tab Take 1 tablet (120 mg total) by mouth daily. 90 tablet 0    Tirzepatide (MOUNJARO) 12.5 MG/0.5ML Subcutaneous Solution Pen-injector Inject 12.5 mg into the skin once a week. (Patient taking differently: Inject 12.5 mg into the skin once a week. Saturdays   last dose 4/20) 6 mL 0    docusate sodium 100 MG Oral Cap Take 1 capsule (100 mg total) by mouth 2 (two) times daily as needed for constipation. 60 capsule 0    metoprolol succinate ER 50 MG Oral Tablet 24 Hr Take 1 tablet (50 mg total) by mouth daily. 90 tablet 1    clobetasol 0.05 % External Ointment Apply 2 x per day for 1 week, then 1 x per day for 1 week, then 2 - 3 x per week as directed 45 g 1    FLUoxetine HCl 40 MG Oral Cap Take 1 capsule (40 mg total) by mouth daily. 90 capsule 3    verapamil  MG Oral Tab CR Take 1 tablet (120 mg total) by mouth daily. 90 tablet 3    zinc sulfate 220 (50 Zn) MG Oral Cap Take 1 capsule (220 mg total) by mouth daily.      Turmeric (QC TUMERIC COMPLEX OR) Take by mouth.      Probiotic Product (PROBIOTIC DAILY OR) Take by mouth once daily.      Cholecalciferol (VITAMIN D-3 OR) Take 15,000 Units by mouth daily.       aspirin 81 MG Oral Tab Take 1 tablet (81 mg total) by mouth daily.  0    COENZYME Q-10 OR Take 1 tablet by mouth daily.      MAGNESIUM OR Take by mouth.      Ascorbic Acid (VITAMIN C OR) Take by mouth.      Vitamin B-12 500 MCG Oral Tab Take 1 tablet (500 mcg total) by mouth daily.       Allergies   Allergen Reactions     Amoxicillin FEVER    Sulfa Antibiotics OTHER (SEE COMMENTS)     Don't remember     Family History   Problem Relation Age of Onset    Heart Disease Father     Cancer Mother         Endometrial, liver, and uterer, colon    Endometrial Cancer Mother         treated with radiation and surgery    Colon Cancer Mother     Colon Cancer Brother     No Known Problems Maternal Grandmother         lived to age 95    Cancer Maternal Grandfather 78        leukemia    Heart Disease Paternal Grandmother     Stroke Paternal Grandmother 68    Lung Disorder Paternal Grandfather         emphysema    Breast Cancer Neg     Ovarian Cancer Neg      Social History     Occupational History    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[employer]   Tobacco Use    Smoking status: Never    Smokeless tobacco: Never   Vaping Use    Vaping status: Never Used   Substance and Sexual Activity    Alcohol use: No    Drug use: No    Sexual activity: Not Currently        ROS:  Complete ROS reviewed by me and non-contributory to the chief complaint except as mentioned above.    Physical Exam:    Ht 5' 8\" (1.727 m)   Wt 141 lb (64 kg)   BMI 21.44 kg/m²     Right foot and ankle  Splint removed.  Moderate swelling and ecchymosis about the right ankle.  Very little swelling of the leg over the foot.  Staples are intact about the medial and lateral ankle.  The ankle is stable.  She can feel light touch to her toes and wiggle her toes.  The foot is warm and well-perfused.  No calf pain.  No signs of infection    Imaging: X-rays of the right ankle show excellent correction of the fracture medial and lateral ankle.  Hardware is intact.  Personally viewed, independently interpreted and radiology report read.      Assessment/Diagnoses:  Diagnoses and all orders for this visit:    Aftercare following surgery    S/P ORIF (open reduction internal fixation) fracture        Plan:  I reviewed imaging and exam findings with the patient.  She has excellent  correction noted on x-ray and we went over the films.  Her staples were removed without incident and light bandage applied over the incisions  Short leg nonweightbearing cast was applied and she is to remain nonweightbearing.  The plan is to follow-up with Dr. Cano with x-rays out of plaster in 1 month.    Rx tramadol renewed    Dr. Cano would like her to go ahead and wait for her last infusion of chemotherapy until 6 weeks postop              Cyndee Venegas, DPodiatric Surgery  [facility]  [address] [place] [address] [id number] [place] [id number]   [address] [place], [place] [id number] [place] [email or web address]  [email or web address]  t: [phone]   f: [phone]              This document was partially prepared using Dragon Medical voice recognition software.

## 2024-05-16 RX ORDER — TRAMADOL HYDROCHLORIDE 50 MG/1
50 TABLET ORAL EVERY 6 HOURS PRN
Qty: 15 TABLET | Refills: 0 | Status: SHIPPED | OUTPATIENT
Start: 2024-05-16

## 2024-05-26 DIAGNOSIS — R80.9 TYPE 2 DIABETES MELLITUS WITH MICROALBUMINURIA, WITHOUT LONG-TERM CURRENT USE OF INSULIN (HCC): ICD-10-CM

## 2024-05-26 DIAGNOSIS — E11.29 TYPE 2 DIABETES MELLITUS WITH MICROALBUMINURIA, WITHOUT LONG-TERM CURRENT USE OF INSULIN (HCC): ICD-10-CM

## 2024-05-29 RX ORDER — TIRZEPATIDE 12.5 MG/.5ML
12.5 INJECTION, SOLUTION SUBCUTANEOUS WEEKLY
Qty: 6 ML | Refills: 1 | Status: SHIPPED | OUTPATIENT
Start: 2024-05-29

## 2024-05-29 NOTE — TELEPHONE ENCOUNTER
Please review; protocol failed/ has no protocol    Requested Prescriptions   Pending Prescriptions Disp Refills    Tirzepatide (MOUNJARO) 12.5 MG/0.5ML Subcutaneous Solution Pen-injector 6 mL 0     Sig: Inject 12.5 mg into the skin once a week.       Diabetes Medication Protocol Failed - 5/26/2024  4:33 PM        Failed - Microalbumin procedure in past 12 months or taking ACE/ARB        Passed - Last A1C < 7.5 and within past 6 months     Lab Results   Component Value Date    A1C 6.9 (H) 01/05/2024             Passed - In person appointment or virtual visit in the past 6 mos or appointment in next 3 mos     Recent Outpatient Visits              2 weeks ago Aftercare following surgery    [address], Cyndee Castaneda DPM    Office Visit    1 month ago Bimalleolar ankle fracture, right, closed, initial encounter    [facility]    Nurse Only    1 month ago Preop examination    [facility] Aleisha Ramirez DO    Office Visit    1 month ago Bimalleolar ankle fracture, right, closed, initial encounter    [facility], [address], [place] Genoveva Cano MD    Office Visit    1 month ago Ankle injury, initial encounter    [facility], Virtual Visit Shilpa Hanks APRN    Telemedicine          Future Appointments         Provider Department Appt Notes    In 2 weeks Genoveva Cano MD [address] Follow up on ankle surgery                    Passed - EGFRCR or GFRNAA > 50     GFR Evaluation  EGFRCR: 96 , resulted on 4/12/2024          Passed - GFR in the past 12 months           Recent Outpatient Visits              2 weeks ago Aftercare following [address]    [address] D[address]    Office Visit    1 month ago Bimalleolar ankle fracture, right, closed, initial encounter     [facility], [facility]    Nurse Only    1 month ago Preop examination    [facility], [facility] Aleisha Ramirez DO    Office Visit    1 month ago Bimalleolar ankle fracture, right, closed, initial encounter    [facility], [address], [place] Genoveva Cano MD    Office Visit    1 month ago Ankle injury, initial encounter    [facility], Virtual Visit Shilpa Hanks APRN    Telemedicine          Future Appointments         Provider Department Appt Notes    In 2 weeks Genoveva Cano MD [facility], [address] [place] [address] Follow up on ankle surgery

## 2024-05-30 NOTE — TELEPHONE ENCOUNTER
Outgoing call to patient and patient stated she will make the appointment thought my chart .    No action needed at this time .

## 2024-06-11 ENCOUNTER — TELEPHONE (OUTPATIENT)
Dept: ORTHOPEDICS CLINIC | Facility: CLINIC | Age: 69
End: 2024-06-11

## 2024-06-11 DIAGNOSIS — Z87.81 STATUS POST ORIF OF FRACTURE OF ANKLE: ICD-10-CM

## 2024-06-11 DIAGNOSIS — Z98.890 S/P ORIF (OPEN REDUCTION INTERNAL FIXATION) FRACTURE: ICD-10-CM

## 2024-06-11 DIAGNOSIS — Z48.89 AFTERCARE FOLLOWING SURGERY: Primary | ICD-10-CM

## 2024-06-11 DIAGNOSIS — Z98.890 STATUS POST ORIF OF FRACTURE OF ANKLE: ICD-10-CM

## 2024-06-11 DIAGNOSIS — Z87.81 S/P ORIF (OPEN REDUCTION INTERNAL FIXATION) FRACTURE: ICD-10-CM

## 2024-06-11 NOTE — TELEPHONE ENCOUNTER
XR ordered per ortho protocol. XR scheduled and patient was notified via [employer] to let them know cast will be removed first in order for them to complete imaging.

## 2024-06-13 ENCOUNTER — HOSPITAL ENCOUNTER (OUTPATIENT)
Dept: GENERAL RADIOLOGY | Age: 69
Discharge: HOME | End: 2024-06-13
Attending: ORTHOPAEDIC SURGERY
Payer: COMMERCIAL

## 2024-06-13 ENCOUNTER — OFFICE VISIT (OUTPATIENT)
Dept: ORTHOPEDICS CLINIC | Facility: CLINIC | Age: 69
End: 2024-06-13
Payer: COMMERCIAL

## 2024-06-13 VITALS — HEIGHT: 68 IN | WEIGHT: 141 LBS | BODY MASS INDEX: 21.37 KG/M2

## 2024-06-13 DIAGNOSIS — G62.9 NEUROPATHY: ICD-10-CM

## 2024-06-13 DIAGNOSIS — Z87.81 STATUS POST ORIF OF FRACTURE OF ANKLE: ICD-10-CM

## 2024-06-13 DIAGNOSIS — S82.841D BIMALLEOLAR ANKLE FRACTURE, RIGHT, CLOSED, WITH ROUTINE HEALING, SUBSEQUENT ENCOUNTER: ICD-10-CM

## 2024-06-13 DIAGNOSIS — Z98.890 STATUS POST ORIF OF FRACTURE OF ANKLE: ICD-10-CM

## 2024-06-13 DIAGNOSIS — Z48.89 AFTERCARE FOLLOWING SURGERY: Primary | ICD-10-CM

## 2024-06-13 DIAGNOSIS — Z48.89 AFTERCARE FOLLOWING SURGERY: ICD-10-CM

## 2024-06-13 PROCEDURE — 99024 POSTOP FOLLOW-UP VISIT: CPT | Performed by: ORTHOPAEDIC SURGERY

## 2024-06-13 PROCEDURE — 3008F BODY MASS INDEX DOCD: CPT | Performed by: ORTHOPAEDIC SURGERY

## 2024-06-13 PROCEDURE — 73610 X-RAY EXAM OF ANKLE: CPT | Performed by: ORTHOPAEDIC SURGERY

## 2024-06-13 NOTE — PROGRESS NOTES
[facility] Post-op Progress Note      Date of Surgery: 4/26/2024    History: Blanquita is a 68-year-old female presenting with her daughter for postop follow-up approximately 6 weeks after undergoing ORIF of the right ankle bimalleolar fracture.  She has been nonweightbearing in a short leg cast which she has tolerated well.  Some of her neuropathic numbness and tingling from chemotherapy may have been accentuated on this right side.  No constitutional symptoms or new pains underneath the cast.    Physical Exam: Short leg fiberglass cast is removed.  Wounds are healing uneventfully with flaking eschar more lateral than medial.  Swelling is resolved with fading ecchymosis distally about the foot and ankle.  She can dorsiflex actively to neutral and plantar flex about 10 degrees.  She wiggles her toes freely with good perfusion distally.    Imaging: Multiple views right ankle redemonstrates internal fixation of a right ankle bimalleolar fracture with maintained alignment and no evidence of hardware migration.  Significant demineralization is noted throughout the foot and ankle.        Assessment: Status post ORIF bimalleolar right ankle fracture doing well    Plan: I reviewed imaging and exam findings with the patient and her daughter.  Overall she is doing well with stable x-rays although significant disuse osteopenia is noted.  Her bones were very demineralized even preoperatively.  Unfortunately with her history of chemotherapy her healing potential may be slightly slowed compared to baseline.  We will therefore first work on range of motion with physical therapy with conversion to a boot today.  Once range of motion is normalized then perhaps at the 8 to 10-week arthur we can begin some light partial weightbearing through the boot to be progressed to full weightbearing by the 12-week arthur.  Physical therapy prescription is provided and she will attend near her home.  A follow-up x-ray is recommended in 1  month at which point progression to full weightbearing can be initiated.  All questions were answered and she and her daughter verbalized understanding and appreciation.  Follow-up sooner if she has new symptoms or concerns.    Genoveva Cano MD, [facility]  Orthopaedic Surgery   Sports Medicine/Knee and Shoulder  [facility]  t: [phone]  f: [phone]           This document was partially prepared using Dragon Medical voice recognition software.  Although every attempt is made to correct errors during dictation, discrepancies may still exist.

## 2024-06-14 ENCOUNTER — PATIENT MESSAGE (OUTPATIENT)
Dept: ORTHOPEDICS CLINIC | Facility: CLINIC | Age: 69
End: 2024-06-14

## 2024-06-14 RX ORDER — METOPROLOL SUCCINATE 50 MG/1
50 TABLET, EXTENDED RELEASE ORAL DAILY
Qty: 90 TABLET | Refills: 3 | Status: SHIPPED | OUTPATIENT
Start: 2024-06-14

## 2024-06-14 NOTE — TELEPHONE ENCOUNTER
Please review. Protocol Failed or has No Protocol.    Requested Prescriptions   Pending Prescriptions Disp Refills    METOPROLOL SUCCINATE ER 50 MG Oral Tablet 24 Hr [Pharmacy Med Name: METOPROLOL ER SUCCINATE 50MG TABS] 90 tablet 1     Sig: TAKE 1 TABLET(50 MG) BY MOUTH DAILY       Hypertension Medications Protocol Failed - 6/10/2024  2:47 PM        Failed - Last BP reading less than 140/90     BP Readings from Last 1 Encounters:   04/26/24 (!) 120/97               Passed - CMP or BMP in past 12 months        Passed - In person appointment or virtual visit in the past 12 mos or appointment in next 3 mos     Recent Outpatient Visits              Yesterday [address]care following [address]    [address]    Office Visit    1 month ago Aftercare following surgery    [address]    Office Visit    1 month ago Bimalleolar ankle fracture, right, closed, initial encounter    [facility]    Nurse Only    1 month ago Preop examination    [facility] Aleisha Ramirez DO    Office Visit    1 month ago Bimalleolar ankle fracture, right, closed, initial encounter    [facility], [address], [place] Genoveva Cano MD    Office Visit          Future Appointments         Provider Department Appt Notes    In 1 month Aleisha Ramirez DO [facility] Follow up on blood work.                    Passed - EGFRCR or GFRNAA > 50     GFR Evaluation  EGFRCR: 96 , resulted on 4/12/2024               Recent Outpatient Visits              Yesterday Aftercare following surgery    [address]    Office Visit    1 month ago Aftercare following surgery    [address]    Office  Visit    1 month ago Bimalleolar ankle fracture, right, closed, initial encounter    [facility]    Nurse Only    1 month ago Preop examination    [facility] Aleisha Ramirez DO    Office Visit    1 month ago Bimalleolar ankle fracture, right, closed, initial encounter    [facility] [place] Genoveva Cano MD    Office Visit            Future Appointments         Provider Department Appt Notes    In 1 month Aleisha Ramirez DO [facility] Follow up on blood work.

## 2024-06-15 NOTE — TELEPHONE ENCOUNTER
Medication requested: tramadol  DOS: 4.26.24  Last OV: 6/13/2024   Last refill date: 4/23/24     #/refills: 60/0  Upcoming appt: 7/11/2024    RX pended for review and approval    Progress Note - Geriatric Medicine   Cass Hernandes 79 y o  male MRN: [id number]  Unit/Bed#: [facility] 606-01 Encounter: [id number]      Assessment/Plan:    Ambulatory dysfunction with fall  -Reportedly mechanical fall x2, 12/25 and 1/29, none prior   -injuries as outlined below  -Remains high future falls, continue fall precautions  -PT/OT following      Fracture of T12 vertebrae  -Noted on CT abdomen and pelvis obtained on admission  -Nsx following, recommend conservative management   -continue acute pain control  -d/w Nsx, pain radiating down legs worsening and cannot be fully attributed to this specific injury, MRI pending for further evaluation  -continue thoracolumbar spine precautions  -Neurovascular checks per protocol  -TLSO brace as directed    Acute pain due to trauma  -Continue acute multimodal pain control  -APS on consult     Right-sided hydronephrosis  -Due to right ureteral stone  -S/p cystoscopy with lithotripsy and ureteral stent insertion 1/26/22  -Maintained on Flomax  -Persistent chronic moderate right-sided hydronephrosis, right ureteral stent in place  -Dickens catheter removed yesterday, unable to void since removal, continue retention protocol     Atrial flutter  -Home regimen includes rate control with metoprolol and anticoagulation with Eliquis  -Eliquis held for recent ureteral stent placement, since been resumed    Sick sinus syndrome  -S/p pacemaker placement  -Will require rep to place pacer in "MRI mode" for upcoming studies  -Continue close outpatient follow-up with Cardiology     CAD  -S/p PCI  -Maintained on chronic systemic anticoagulation with Eliquis (for A  Fib/flutter)  -Continue secondary risk factor modifications and encouraged healthy lifestyle      Impaired mastication  -Requires use of dentures -encourage use all appropriate times  -ensure meal consistency appropriate for abilities  -continue aspiration precautions     Deconditioning/debility/frailty  -Clinical frailty scale stage V, mildly frail   -Due to age, ambulatory dysfunction with recent falls, CAD, and multiple additional chronic medical co-morbidities  -Optimize chronic conditions  -Encourage well-balanced nutritional intake    Delirium precautions  -continue delirium precautions  -maintain normal sleep/wake cycle  -Ensure pain controlled  -Monitor for fecal and urinary retention    Care coordination: rounded with Ricarda Gamez (RN) and Simone Daniels (NSx AP)     Subjective:     Esther Holt is seen and examined at bedside, he reports increasing pain in his lower back and bilateral thighs  He notes that the current pain regimen takes the edge off but is not providing adequate analgesia  He had his wynn removed yesterday and has been unable to void since then, he required straight cath overnight  Review of Systems   Constitutional: Negative for appetite change (slightly reduced from baseline ), chills and fever  HENT: Positive for dental problem (dentures)  Eyes: Negative  Negative for visual disturbance  Respiratory: Negative  Negative for shortness of breath  Cardiovascular: Negative  Negative for leg swelling  Gastrointestinal: Negative  Genitourinary: Positive for difficulty urinating  Negative for dysuria  Musculoskeletal: Positive for back pain and gait problem  Skin: Negative  Neurological: Negative for dizziness, light-headedness and headaches  Pain and numbness radiating from lower back down both legs wrapping lateral to medial   Hematological: Negative  Psychiatric/Behavioral: Negative  Negative for sleep disturbance  All other systems reviewed and are negative  Objective:     Vitals: Blood pressure 134/63, pulse 83, temperature 97 5 °F (36 4 °C), resp  rate 18, SpO2 97 %  ,There is no height or weight on file to calculate BMI        Intake/Output Summary (Last 24 hours) at 2/3/2023 0900  Last data filed at 2/3/2023 0501  Gross per 24 hour   Intake 480 ml   Output 1400 ml   Net -920 ml Current Medications: Reviewed    Physical Exam:   Physical Exam  Vitals and nursing note reviewed  Constitutional:       General: He is not in acute distress  Appearance: Normal appearance  He is obese  HENT:      Head: Normocephalic  Comments: R lower eyelid chalazion, L lower eyelid area of irritation and redness      Nose: Nose normal       Mouth/Throat:      Mouth: Mucous membranes are dry  Comments: Dentures in place   Eyes:      General: No scleral icterus  Right eye: No discharge  Left eye: No discharge  Conjunctiva/sclera: Conjunctivae normal    Neck:      Comments: Trachea midline   Cardiovascular:      Rate and Rhythm: Normal rate  Pulmonary:      Effort: Pulmonary effort is normal  No respiratory distress  Breath sounds: No wheezing  Abdominal:      General: Bowel sounds are normal  There is no distension  Palpations: Abdomen is soft  Tenderness: There is no abdominal tenderness  Comments: Obese abd   Musculoskeletal:      Cervical back: Neck supple  Right lower leg: No edema  Left lower leg: No edema  Comments: Mild reduced overall muscle mass   Skin:     General: Skin is warm and dry  Neurological:      Mental Status: He is alert  Comments: Awake and alert, oriented and ans ques appropriately    Psychiatric:         Mood and Affect: Mood normal          Behavior: Behavior normal       Comments: Pleasant and cooperative         Invasive Devices     Peripheral Intravenous Line  Duration           Peripheral IV 02/01/23 Right Antecubital 1 day          Drain  Duration           Ureteral Internal Stent Right ureter 6 Fr  20 days              Lab, Imaging and other studies: I have personally reviewed pertinent reports

## 2024-06-15 NOTE — TELEPHONE ENCOUNTER
From: Blanquita Hernandez  To: Genoveva Cano  Sent: 6/14/2024 8:26 PM CDT  Subject: Refill pain medication    Hi Dr. Cano:  I am just about out of Tramadol. Could you please refill this prescription as I am still having quite a bit of pain, particularly at night.    Thanks,  Blanquita Hernandez

## 2024-06-19 ENCOUNTER — PATIENT MESSAGE (OUTPATIENT)
Facility: CLINIC | Age: 69
End: 2024-06-19

## 2024-06-19 DIAGNOSIS — E11.29 TYPE 2 DIABETES MELLITUS WITH MICROALBUMINURIA (HCC): Primary | ICD-10-CM

## 2024-06-19 DIAGNOSIS — R80.9 TYPE 2 DIABETES MELLITUS WITH MICROALBUMINURIA (HCC): Primary | ICD-10-CM

## 2024-06-19 NOTE — TELEPHONE ENCOUNTER
Dr. Ramirez      Please see [employer] message below     Patient has had  a multitude of tests in March and April ( from other providers )     Do you want to order anything for the upcoming visit ?    Please advise and thank you.      Future Appointments   Date Time Provider Department Center   6/27/2024  5:30 PM Aleisha Ramirez DO [facility]   7/11/2024  2:00 PM [id number]   8/12/2024  9:30 AM Aleisha Ramirez DO [facility]             From: Blanquita Hernandez  To: Aleisha Ramirez  Sent: 6/19/2024  3:28 PM CDT  Subject: Bloodwork    Hi Dr. Ramirez:  I was able to obtain an appointment with you next week, Thursday, June 27 at 5: 30.  Could you please order all of the bloodwork I need so it can be completed so that we can discuss the results next Thursday.    Thanks,  Blanquita Hernandez

## 2024-06-20 NOTE — TELEPHONE ENCOUNTER
Labs ordered with urine screening, and should also make sure she completes thyroid testing at that time.

## 2024-06-21 NOTE — TELEPHONE ENCOUNTER
Telephone conversation with the patient.  I inquired as to the nature of her pain given that she is nearly 2 months status post ORIF of an ankle fracture.  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  Rather than refilling narcotics I suggested that she begin with a regimen of over-the-counter analgesics including Tylenol alternating with anti-inflammatories.  I also suggested that perhaps a consultation with neurology through her primary care physician would be an option to see if there are any alternative medications to calm her neuropathic pain.  She expressed understanding and appreciation and agreement with this plan.

## 2024-06-25 DIAGNOSIS — E11.29 TYPE 2 DIABETES MELLITUS WITH MICROALBUMINURIA, WITHOUT LONG-TERM CURRENT USE OF INSULIN (HCC): ICD-10-CM

## 2024-06-25 DIAGNOSIS — R80.9 TYPE 2 DIABETES MELLITUS WITH MICROALBUMINURIA, WITHOUT LONG-TERM CURRENT USE OF INSULIN (HCC): ICD-10-CM

## 2024-06-28 ENCOUNTER — PATIENT MESSAGE (OUTPATIENT)
Facility: CLINIC | Age: 69
End: 2024-06-28

## 2024-06-28 DIAGNOSIS — E11.29 TYPE 2 DIABETES MELLITUS WITH MICROALBUMINURIA, WITHOUT LONG-TERM CURRENT USE OF INSULIN (HCC): ICD-10-CM

## 2024-06-28 DIAGNOSIS — R80.9 TYPE 2 DIABETES MELLITUS WITH MICROALBUMINURIA, WITHOUT LONG-TERM CURRENT USE OF INSULIN (HCC): ICD-10-CM

## 2024-06-28 RX ORDER — TIRZEPATIDE 12.5 MG/.5ML
12.5 INJECTION, SOLUTION SUBCUTANEOUS WEEKLY
Qty: 2 ML | Refills: 0 | Status: SHIPPED | OUTPATIENT
Start: 2024-06-28

## 2024-06-28 NOTE — TELEPHONE ENCOUNTER
Sent prescription to [facility] as they should reportedly have it in stock by Monday and can also ship to the patient's house. [place] If she does run out of the 12.5 mg dose and will be out for several days then can also go back on Metformin temporarily to ensure her blood sugars remain controlled.

## 2024-06-28 NOTE — TELEPHONE ENCOUNTER
Patient sent the following message     Marking as high priority    Blanquita VILLAGOMEZ Em Triage Support (supporting Aleisha Ramirez DO)8 hours ago (2:33 AM)     SP  I requested a refill for Mounjaro - I am all out and need to take a shot on Saturday. I requested this on Tuesday, but still have not heard anything.     Thanks,  Blanquita Hernandez

## 2024-06-28 NOTE — TELEPHONE ENCOUNTER
Please review; protocol failed/ has no protocol      Please advise patients Mounjaro 12.5 mg is on back order at patient's [facility]'s pharmacy, spoke with pharmacist at patient's [facility]'s  pharmacy said only have lower strengths available of Mounjaro  , patient will be out of medication tomorrow         Please see patients MyChart Message     David, April, RN16 minutes ago (10:44 AM)     AR  Patient sent the following message      Marking as high priority     Blanquita VILLAGOMEZ Em Triage Support (supporting Aleisha Ramirez DO)8 hours ago (2:33 AM)      SP  I requested a refill for Mounjaro - I am all out and need to take a shot on Saturday. I requested this on Tuesday, but still have not heard anything.     Thanks,  Blanquita Hernandez          Requested Prescriptions   Pending Prescriptions Disp Refills    Tirzepatide (MOUNJARO) 12.5 MG/0.5ML Subcutaneous Solution Pen-injector 6 mL 1     Sig: Inject 12.5 mg into the skin once a week.       Diabetes Medication Protocol Failed - 6/28/2024 10:59 AM        Failed - Microalbumin procedure in past 12 months or taking ACE/ARB        Passed - Last A1C < 7.5 and within past 6 months     Lab Results   Component Value Date    A1C 6.9 (H) 01/05/2024             Passed - In person appointment or virtual visit in the past 6 mos or appointment in next 3 mos     Recent Outpatient Visits              2 weeks ago Aftercare following surgery    [facility], [address] [place] [address], Genoveva Schneider MD    Office Visit    1 month ago Aftercare following surgery    [facility], [address] [place] [address], Cyndee Castaneda DPM    Office Visit    2 months ago Bimalleolar ankle fracture, right, closed, initial encounter    [facility]    Nurse Only    2 months ago Preop examination    [facility] Aleisha Ramirez DO    Office Visit    2 months ago Bimalleolar ankle fracture, right,  closed, initial encounter    [facility], [place] Choi, Kellen, MD    Office Visit          Future Appointments         Provider Department Appt Notes    In 1 week Genoveva Cano MD [address] P/O RT ANKLE ORIF;DOS:04/26/24    In 1 month Aleisha Ramirez DO [facility] Follow up on blood work.                    Passed - EGFRCR or GFRNAA > 50     GFR Evaluation  EGFRCR: 96 , resulted on 4/12/2024          Passed - GFR in the past 12 months           Recent Outpatient Visits              2 weeks ago [address] following [address]    [address]    Office Visit    1 month ago Aftercare following surgery    [address], Cyndee Castaneda DPM    Office Visit    2 months ago Bimalleolar ankle fracture, right, closed, initial encounter    [facility]    Nurse Only    2 months ago Preop examination    [facility] Aleisha Ramirez DO    Office Visit    2 months ago Bimalleolar ankle fracture, right, closed, initial encounter    [facility] [place] Choi, Kellen, MD    Office Visit          Future Appointments         Provider Department Appt Notes    In 1 week Genoveva Cano MD [address] P/O RT ANKLE ORIF;DOS:04/26/24    In 1 month Aleisha Ramirez DO [facility] Follow up on blood work.

## 2024-06-28 NOTE — TELEPHONE ENCOUNTER
Patient contacted (name and  of patient verified). Dr. Ramirez's message reviewed. Patient verbalizes understanding. She states she may call around to see if other pharmacy locations have her medication in stock, will call back if needed. Does have Metformin to take if she is not able to get Mounjaro. Office phone number and hours provided. Advised to call back with any issues or questions, verbalizes understanding.

## 2024-07-02 ENCOUNTER — MED REC SCAN ONLY (OUTPATIENT)
Dept: ORTHOPEDICS CLINIC | Facility: CLINIC | Age: 69
End: 2024-07-02

## 2024-07-02 ENCOUNTER — TELEPHONE (OUTPATIENT)
Dept: ORTHOPEDICS CLINIC | Facility: CLINIC | Age: 69
End: 2024-07-02

## 2024-07-02 DIAGNOSIS — Z98.890 STATUS POST ORIF OF FRACTURE OF ANKLE: ICD-10-CM

## 2024-07-02 DIAGNOSIS — Z48.89 AFTERCARE FOLLOWING SURGERY: Primary | ICD-10-CM

## 2024-07-02 DIAGNOSIS — Z87.81 STATUS POST ORIF OF FRACTURE OF ANKLE: ICD-10-CM

## 2024-07-02 RX ORDER — TIRZEPATIDE 12.5 MG/.5ML
12.5 INJECTION, SOLUTION SUBCUTANEOUS WEEKLY
Qty: 6 ML | Refills: 0 | Status: SHIPPED | OUTPATIENT
Start: 2024-07-02 | End: 2024-07-16

## 2024-07-02 NOTE — TELEPHONE ENCOUNTER
XR ordered per ortho protocol. XR scheduled and patient was notified via [employer] to let them know that they should arrive 15-20 minutes early, in order for them to complete imaging.

## 2024-07-11 ENCOUNTER — HOSPITAL ENCOUNTER (OUTPATIENT)
Dept: GENERAL RADIOLOGY | Age: 69
Discharge: HOME | End: 2024-07-11
Attending: ORTHOPAEDIC SURGERY
Payer: COMMERCIAL

## 2024-07-11 ENCOUNTER — OFFICE VISIT (OUTPATIENT)
Dept: ORTHOPEDICS CLINIC | Facility: CLINIC | Age: 69
End: 2024-07-11
Payer: COMMERCIAL

## 2024-07-11 VITALS — WEIGHT: 141 LBS | HEIGHT: 68 IN | BODY MASS INDEX: 21.37 KG/M2

## 2024-07-11 DIAGNOSIS — Z87.81 STATUS POST ORIF OF FRACTURE OF ANKLE: ICD-10-CM

## 2024-07-11 DIAGNOSIS — Z98.890 STATUS POST ORIF OF FRACTURE OF ANKLE: ICD-10-CM

## 2024-07-11 DIAGNOSIS — Z48.89 AFTERCARE FOLLOWING SURGERY: ICD-10-CM

## 2024-07-11 DIAGNOSIS — Z48.89 AFTERCARE FOLLOWING SURGERY: Primary | ICD-10-CM

## 2024-07-11 PROCEDURE — 3008F BODY MASS INDEX DOCD: CPT | Performed by: PHYSICIAN ASSISTANT

## 2024-07-11 PROCEDURE — 73610 X-RAY EXAM OF ANKLE: CPT | Performed by: ORTHOPAEDIC SURGERY

## 2024-07-11 PROCEDURE — 99024 POSTOP FOLLOW-UP VISIT: CPT | Performed by: PHYSICIAN ASSISTANT

## 2024-07-11 NOTE — PROGRESS NOTES
[facility] Post Op Progress Note    Date of Surgery: 04/26/2024      Subjective: Blanquita Hernandez is a 68 year old female who is here with her daughter for follow-up of her right ankle.  She underwent ORIF right ankle bimalleolar fracture approximately 10 weeks ago.  She just started weightbearing as tolerated in a cam boot with the assistance of a walker this week with home physical therapy.  She states that she is very sore in her ankle and her leg after yesterday's session.  She does have some associated stiffness in the ankle.  No other significant complaints.      Objective: Exam of the right foot and ankle reveals that the wounds are well-healed.  She has no significant swelling of the ankle medial or bilaterally.  She has 20 degrees of dorsiflexion and near full plantarflexion.  Sensation is present to light touch.  No tenderness to palpation.    XR ANKLE (MIN 3 VIEWS), RIGHT (CPT=73610)    Result Date: 7/11/2024  CONCLUSION:  1. Stable postsurgical changes status post ORIF of the fibula and tibia with subtle fracture line in the distal fibula.  Severe diffuse osteopenia is noted.   LOCATION:  [facility]   Dictated by (CST): Tom Adams MD on 7/11/2024 at 2:29 PM     Finalized by (CST): Tom Adams MD on 7/11/2024 at 2:30 PM            Assessment: Status post open reduction internal fixation bimalleolar right ankle fracture doing well      Plan: X-ray and exam findings were discussed with the patient and her daughter.  The x-rays remain stable and she will continue progressing with weightbearing.  She will progress to 50% weightbearing as tolerated over the next 2 weeks and then progress to full weightbearing as tolerated in the boot thereafter.  When she is sitting, she will remove the boot to work on ankle range of motion exercises.  She will continue with home physical therapy for gait training and motion.  She does have some stiffness and will aggressively stretch the ankle.  I will follow-up with her in 4  to 6 weeks for recheck including x-rays of the right ankle at that time.  At that time, she will transition out of the boot if she is ambulating freely without pain.  She will follow-up sooner with questions, concerns, or worsening symptoms.      Jessica Lopez [facility], PA-C Orthopedic Surgery   [address] [place] [id number]   t: [phone]  f: [phone]

## 2024-07-16 DIAGNOSIS — R80.9 TYPE 2 DIABETES MELLITUS WITH MICROALBUMINURIA, WITHOUT LONG-TERM CURRENT USE OF INSULIN (HCC): ICD-10-CM

## 2024-07-16 DIAGNOSIS — E11.29 TYPE 2 DIABETES MELLITUS WITH MICROALBUMINURIA, WITHOUT LONG-TERM CURRENT USE OF INSULIN (HCC): ICD-10-CM

## 2024-07-19 PROBLEM — G62.0 CHEMOTHERAPY-INDUCED NEUROPATHY (HCC): Status: ACTIVE | Noted: 2024-07-19

## 2024-07-19 PROBLEM — T45.1X5A CHEMOTHERAPY-INDUCED NEUROPATHY (HCC): Status: ACTIVE | Noted: 2024-07-19

## 2024-07-19 RX ORDER — TIRZEPATIDE 12.5 MG/.5ML
12.5 INJECTION, SOLUTION SUBCUTANEOUS WEEKLY
Qty: 6 ML | Refills: 0 | Status: SHIPPED | OUTPATIENT
Start: 2024-07-19

## 2024-07-19 NOTE — TELEPHONE ENCOUNTER
Please review; protocol failed/No Protocol    Patient requesting different pharmacy sending what is left on the prescription from previous.     Requested Prescriptions   Pending Prescriptions Disp Refills    Tirzepatide (MOUNJARO) 12.5 MG/0.5ML Subcutaneous Solution Pen-injector 6 mL 0     Sig: Inject 12.5 mg into the skin once a week.       Diabetes Medication Protocol Failed - 7/16/2024  3:13 PM        Failed - Last A1C < 7.5 and within past 6 months     Lab Results   Component Value Date    A1C 6.9 (H) 01/05/2024             Failed - Microalbumin procedure in past 12 months or taking ACE/ARB        Passed - In person appointment or virtual visit in the past 6 mos or appointment in next 3 mos     Recent Outpatient Visits              1 week ago Aftercare following surgery    [address] Jessica Lopez PA-C    Office Visit    1 month ago Aftercare following surgery    [address] Genoveva Cano MD    Office Visit    2 months ago Aftercare following [address]    [address] D[address]    Office Visit    2 months ago Bimalleolar ankle fracture, right, closed, initial encounter    [facility]    Nurse Only    2 months ago Preop examination    [facility] Aleisha Ramirez DO    Office Visit          Future Appointments         Provider Department Appt Notes    In 3 weeks Aleisha Ramirez DO [facility] Follow up on blood work.                    Passed - EGFRCR or GFRNAA > 50     GFR Evaluation  EGFRCR: 96 , resulted on 4/12/2024          Passed - GFR in the past 12 months           Future Appointments         Provider Department Appt Notes    In 3 weeks Aleisha Ramirez DO [facility] Follow up on blood work.          Recent  Outpatient Visits              1 week ago Aftercare following surgery    [facility], [address] [place] [address] Jessica Lopez PA-C    Office Visit    1 month ago Aftercare following surgery    [address] Genoveva Cano MD    Office Visit    2 months ago Aftercare following surgery    [address], Cyndee Castaneda, DAVIDM    Office Visit    2 months ago Bimalleolar ankle fracture, right, closed, initial encounter    [facility]    Nurse Only    2 months ago Preop examination    [facility] Aleisha Ramirez DO    Office Visit

## 2024-07-27 RX ORDER — THYROID 120 MG/1
120 TABLET ORAL DAILY
Qty: 30 TABLET | Refills: 0 | Status: SHIPPED | OUTPATIENT
Start: 2024-07-27

## 2024-07-27 NOTE — TELEPHONE ENCOUNTER
Agree that patient needs labs done due to abnormal TSH. Sent in month supply so patient can come back in for thyroid recheck    Blanquita Restrepo MD, 07/27/24, 12:07 PM

## 2024-07-27 NOTE — TELEPHONE ENCOUNTER
Please review. Protocol Failed; No Protocol    Message sent to patient to complete labs     Requested Prescriptions   Pending Prescriptions Disp Refills    THYROID 120 MG Oral Tab [Pharmacy Med Name: THYROID 2GR (120MG) TABLETS] 90 tablet 0     Sig: TAKE 1 TABLET(120 MG) BY MOUTH DAILY       Thyroid Medication Protocol Failed - 7/23/2024  5:28 PM        Failed - Last TSH value is normal     Lab Results   Component Value Date    TSH 30.039 (H) 04/12/2024                 Passed - TSH in past 12 months        Passed - In person appointment or virtual visit in the past 12 mos or appointment in next 3 mos     Recent Outpatient Visits              2 weeks ago Aftercare following surgery    [address] Jessica Lopez PA-C    Office Visit    1 month ago Aftercare following surgery    [address] Genoveva Cano MD    Office Visit    2 months ago Aftercare following surgery    [address] D[address]    Office Visit    3 months ago Bimalleolar ankle fracture, right, closed, initial encounter    [facility]    Nurse Only    3 months ago Preop examination    [facility] Aleisha Ramirez DO    Office Visit          Future Appointments         Provider Department Appt Notes    In 2 weeks Aleisha Ramirez DO [facility] Follow up on blood work.    In 2 weeks Genoveva Cano MD [address] Evaluate right ankle.                           Future Appointments         Provider Department Appt Notes    In 2 weeks Aleisha Ramirez DO [facility] Follow up on blood work.    In 2 weeks Genoveva Cano MD [address] Evaluate right ankle.          Recent Outpatient  Visits              2 weeks ago Aftercare following surgery    [facility], [address] [place] [address] Jessica Lopez PA-C    Office Visit    1 month ago Aftercare following surgery    [address] Genoveva Cano MD    Office Visit    2 months ago Aftercare following surgery    [address], Cyndee Castaneda, DAVIDM    Office Visit    3 months ago Bimalleolar ankle fracture, right, closed, initial encounter    [facility]    Nurse Only    3 months ago Preop examination    [facility] Aleisha Ramirez DO    Office Visit

## 2024-08-06 ENCOUNTER — MED REC SCAN ONLY (OUTPATIENT)
Dept: ORTHOPEDICS CLINIC | Facility: CLINIC | Age: 69
End: 2024-08-06

## 2024-08-08 ENCOUNTER — LAB ENCOUNTER (OUTPATIENT)
Dept: LAB | Age: 69
End: 2024-08-08
Attending: FAMILY MEDICINE
Payer: COMMERCIAL

## 2024-08-08 DIAGNOSIS — E03.9 HYPOTHYROIDISM, UNSPECIFIED TYPE: ICD-10-CM

## 2024-08-08 DIAGNOSIS — E78.2 MIXED HYPERLIPIDEMIA: ICD-10-CM

## 2024-08-08 DIAGNOSIS — R80.9 TYPE 2 DIABETES MELLITUS WITH MICROALBUMINURIA (HCC): ICD-10-CM

## 2024-08-08 DIAGNOSIS — R80.9 POSITIVE FOR MACROALBUMINURIA: ICD-10-CM

## 2024-08-08 DIAGNOSIS — R80.9 POSITIVE FOR MACROALBUMINURIA: Primary | ICD-10-CM

## 2024-08-08 DIAGNOSIS — E11.29 TYPE 2 DIABETES MELLITUS WITH MICROALBUMINURIA (HCC): ICD-10-CM

## 2024-08-08 LAB
ANION GAP SERPL CALC-SCNC: 13 MMOL/L (ref 0–18)
BUN BLD-MCNC: 26 MG/DL (ref 9–23)
BUN/CREAT SERPL: 38.8 (ref 10–20)
CALCIUM BLD-MCNC: 10.4 MG/DL (ref 8.7–10.4)
CHLORIDE SERPL-SCNC: 109 MMOL/L (ref 98–112)
CO2 SERPL-SCNC: 18 MMOL/L (ref 21–32)
CREAT BLD-MCNC: 0.67 MG/DL
CREAT UR-SCNC: 87.1 MG/DL
EGFRCR SERPLBLD CKD-EPI 2021: 95 ML/MIN/1.73M2 (ref 60–?)
EST. AVERAGE GLUCOSE BLD GHB EST-MCNC: 111 MG/DL (ref 68–126)
GLUCOSE BLD-MCNC: 167 MG/DL (ref 70–99)
HBA1C MFR BLD: 5.5 % (ref ?–5.7)
MICROALBUMIN UR-MCNC: 153.4 MG/DL
MICROALBUMIN/CREAT 24H UR-RTO: 1761.2 UG/MG (ref ?–30)
OSMOLALITY SERPL CALC.SUM OF ELEC: 299 MOSM/KG (ref 275–295)
POTASSIUM SERPL-SCNC: 3.8 MMOL/L (ref 3.5–5.1)
SODIUM SERPL-SCNC: 140 MMOL/L (ref 136–145)
TSI SER-ACNC: 4.17 MIU/ML (ref 0.55–4.78)

## 2024-08-08 PROCEDURE — 82043 UR ALBUMIN QUANTITATIVE: CPT | Performed by: FAMILY MEDICINE

## 2024-08-08 PROCEDURE — 83036 HEMOGLOBIN GLYCOSYLATED A1C: CPT | Performed by: FAMILY MEDICINE

## 2024-08-08 PROCEDURE — 80048 BASIC METABOLIC PNL TOTAL CA: CPT | Performed by: FAMILY MEDICINE

## 2024-08-08 PROCEDURE — 82570 ASSAY OF URINE CREATININE: CPT | Performed by: FAMILY MEDICINE

## 2024-08-08 PROCEDURE — 84443 ASSAY THYROID STIM HORMONE: CPT | Performed by: FAMILY MEDICINE

## 2024-08-12 ENCOUNTER — OFFICE VISIT (OUTPATIENT)
Facility: CLINIC | Age: 69
End: 2024-08-12
Payer: COMMERCIAL

## 2024-08-12 VITALS
SYSTOLIC BLOOD PRESSURE: 108 MMHG | BODY MASS INDEX: 19.4 KG/M2 | HEART RATE: 86 BPM | OXYGEN SATURATION: 99 % | WEIGHT: 128 LBS | HEIGHT: 68 IN | DIASTOLIC BLOOD PRESSURE: 60 MMHG

## 2024-08-12 DIAGNOSIS — E11.21 TYPE 2 DIABETES MELLITUS WITH DIABETIC NEPHROPATHY, WITHOUT LONG-TERM CURRENT USE OF INSULIN (HCC): Primary | ICD-10-CM

## 2024-08-12 DIAGNOSIS — I10 ESSENTIAL HYPERTENSION: ICD-10-CM

## 2024-08-12 DIAGNOSIS — E78.2 MIXED HYPERLIPIDEMIA: ICD-10-CM

## 2024-08-12 DIAGNOSIS — T45.1X5A CHEMOTHERAPY-INDUCED NEUROPATHY (HCC): ICD-10-CM

## 2024-08-12 DIAGNOSIS — G62.0 CHEMOTHERAPY-INDUCED NEUROPATHY (HCC): ICD-10-CM

## 2024-08-12 DIAGNOSIS — R30.0 DYSURIA: ICD-10-CM

## 2024-08-12 DIAGNOSIS — E03.9 HYPOTHYROIDISM, UNSPECIFIED TYPE: ICD-10-CM

## 2024-08-12 DIAGNOSIS — C54.1 ENDOMETRIAL CANCER (HCC): ICD-10-CM

## 2024-08-12 LAB
CHOLEST SERPL-MCNC: 241 MG/DL (ref ?–200)
HDLC SERPL-MCNC: 40 MG/DL (ref 40–59)
LDLC SERPL CALC-MCNC: 168 MG/DL (ref ?–100)
NONHDLC SERPL-MCNC: 201 MG/DL (ref ?–130)
TRIGL SERPL-MCNC: 180 MG/DL (ref 30–149)
VLDLC SERPL CALC-MCNC: 36 MG/DL (ref 0–30)

## 2024-08-12 PROCEDURE — 3008F BODY MASS INDEX DOCD: CPT | Performed by: FAMILY MEDICINE

## 2024-08-12 PROCEDURE — 3044F HG A1C LEVEL LT 7.0%: CPT | Performed by: FAMILY MEDICINE

## 2024-08-12 PROCEDURE — 99214 OFFICE O/P EST MOD 30 MIN: CPT | Performed by: FAMILY MEDICINE

## 2024-08-12 PROCEDURE — 3060F POS MICROALBUMINURIA REV: CPT | Performed by: FAMILY MEDICINE

## 2024-08-12 PROCEDURE — 3074F SYST BP LT 130 MM HG: CPT | Performed by: FAMILY MEDICINE

## 2024-08-12 PROCEDURE — 3078F DIAST BP <80 MM HG: CPT | Performed by: FAMILY MEDICINE

## 2024-08-12 RX ORDER — LISINOPRIL 5 MG/1
5 TABLET ORAL DAILY
Qty: 90 TABLET | Refills: 0 | Status: SHIPPED | OUTPATIENT
Start: 2024-08-12

## 2024-08-12 RX ORDER — TIRZEPATIDE 10 MG/.5ML
10 INJECTION, SOLUTION SUBCUTANEOUS WEEKLY
Qty: 2 ML | Refills: 0 | Status: SHIPPED | OUTPATIENT
Start: 2024-08-12

## 2024-08-12 RX ORDER — FLUOXETINE HYDROCHLORIDE 40 MG/1
80 CAPSULE ORAL DAILY
Qty: 180 CAPSULE | Refills: 3 | Status: SHIPPED | OUTPATIENT
Start: 2024-08-12

## 2024-08-12 RX ORDER — CLOBETASOL PROPIONATE 0.5 MG/G
OINTMENT TOPICAL
Qty: 45 G | Refills: 2 | Status: SHIPPED | OUTPATIENT
Start: 2024-08-12

## 2024-08-12 RX ORDER — THYROID 120 MG/1
120 TABLET ORAL DAILY
Qty: 90 TABLET | Refills: 2 | Status: SHIPPED | OUTPATIENT
Start: 2024-08-12

## 2024-08-12 NOTE — PROGRESS NOTES
CC:    Chief Complaint   Patient presents with    Follow - Up     Just a follow up per patient.       HPI: 68 year old female here for follow-up visit.  Has been having a lot of pain in her feet with walking, and seeing a orthopedic specialist next week.  Her feet are very numb from the chemotherapy.   She is doing well on Mounjaro, and feels she has more of an appetite on this medication than she did on Ozempic, but still losing a lot of weight.   She eats a lot less fast food, and also eating a lot less.   Will have lemon water, raspberries, and montes for breakfast.  Dinner is spaghetti, fish, soups, and also eats vegetables.   Does drink water, but trying to increase her water intake.   Has been doing home PT, and it has been going well.   Feels she is ready to increase her Fluoxetine to 80 mg daily as she is really struggling with her diagnosis and the side effects of treatment.     ROS:  General:  No fevers/chills, no night sweats, no fatigue, weight loss, decreased appetite   HEENT:  Denies congestion or nasal discharge  Cardio:  No chest pain  Pulmonary:  No cough, no SOB  GI:  No N/V, no abdominal pain, mild constipation, no diarrhea   :  No discharge, no dysuria, polyuria, no hematuria, intermittent vaginal itching   Dermatologic:  No rashes  Neuro: bilateral foot neuropathy, intermittent vertigo      Past Medical History:    Allergic rhinitis    Anxiety    Arrhythmia    Arthritis    hips    Atherosclerosis of coronary artery    Cancer (HCC)    endometrial cancer stage 1C    Cataract    Depression    Diabetes (HCC)    Disorder of thyroid    hypothyroid    Essential hypertension    Hearing impairment    [place] and has hearing aids    High blood pressure    High cholesterol    Hyperlipidemia    Hypertension    Hypothyroidism    Muscle weakness    Neuropathy    chemo    Osteoarthritis    Personal history of antineoplastic chemotherapy    currently being treated    PONV (postoperative nausea and vomiting)     Sleep apnea    Spinal stenosis    Thyroid disease    Vertigo    Visual impairment    readers       Social History     Socioeconomic History    Marital status:      Spouse name: Not on file    Number of children: Not on file    Years of education: Not on file    Highest education level: Not on file   Occupational History    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[employer]   Tobacco Use    Smoking status: Never    Smokeless tobacco: Never   Vaping Use    Vaping status: Never Used   Substance and Sexual Activity    Alcohol use: No    Drug use: No    Sexual activity: Not Currently   Other Topics Concern    Caffeine Concern No    Exercise Yes    Seat Belt No    Special Diet Not Asked    Stress Concern Not Asked    Weight Concern Yes   Social History Narrative    Lives with daughter    Feels safe     Social Determinants of Health     Financial Resource Strain: Not on file   Food Insecurity: No Food Insecurity (12/21/2023)    Food Insecurity     Food Insecurity: Never true   Transportation Needs: No Transportation Needs (12/21/2023)    Transportation Needs     Lack of Transportation: No   Physical Activity: Not on file   Stress: Not on file   Social Connections: Not on file   Housing Stability: Low Risk  (12/21/2023)    Housing Stability     Housing Instability: No     Housing Instability Emergency: Not on file     Crib or Bassinette: Not on file       Current Outpatient Medications   Medication Sig Dispense Refill    thyroid 120 MG Oral Tab Take 1 tablet (120 mg total) by mouth daily. 30 tablet 0    Tirzepatide (MOUNJARO) 12.5 MG/0.5ML Subcutaneous Solution Pen-injector Inject 12.5 mg into the skin once a week. 6 mL 0    metoprolol succinate ER 50 MG Oral Tablet 24 Hr Take 1 tablet (50 mg total) by mouth daily. 90 tablet 3    polyethylene glycol, PEG 3350, 17 g Oral Powd Pack Take 17 g by mouth daily.      pravastatin 20 MG Oral Tab Take 1 tablet (20 mg total) by mouth nightly.      clobetasol 0.05 %  External Ointment Apply 2 x per day for 1 week, then 1 x per day for 1 week, then 2 - 3 x per week as directed 45 g 1    FLUoxetine HCl 40 MG Oral Cap Take 1 capsule (40 mg total) by mouth daily. 90 capsule 3    verapamil  MG Oral Tab CR Take 1 tablet (120 mg total) by mouth daily. 90 tablet 3    zinc sulfate 220 (50 Zn) MG Oral Cap Take 1 capsule (220 mg total) by mouth daily.      Turmeric (QC TUMERIC COMPLEX OR) Take by mouth.      Probiotic Product (PROBIOTIC DAILY OR) Take by mouth once daily.      Cholecalciferol (VITAMIN D-3 OR) Take 15,000 Units by mouth daily.       aspirin 81 MG Oral Tab Take 1 tablet (81 mg total) by mouth daily.  0    MAGNESIUM OR Take by mouth.      Ascorbic Acid (VITAMIN C OR) Take by mouth.      Vitamin B-12 500 MCG Oral Tab Take 1 tablet (500 mcg total) by mouth daily.      traMADol 50 MG Oral Tab Take 2 tablets (100 mg total) by mouth every 6 (six) hours as needed for Pain. (Patient not taking: Reported on 8/12/2024) 60 tablet 0    COENZYME Q-10 OR Take 1 tablet by mouth daily. (Patient not taking: Reported on 8/12/2024)         Amoxicillin and Sulfa antibiotics      Vitals:   Vitals:    08/12/24 0931   BP: 108/60   Pulse: 86   SpO2: 99%   Weight: 128 lb (58.1 kg)   Height: 5' 8\" (1.727 m)       Body mass index is 19.46 kg/m².    Physical:  General:  Alert, appropriate, no acute distress   HEENT: supple, no lymphadenopathy   Cardio:  RRR, no murmurs, S1, S2  Pulmonary:  Clear bilaterally, good air entry  Dermatologic:  No rashes or lesions  Ext: no cyanosis or edema     Assessment and Plan: 68 year old female here to follow-up on chronic medical conditions.     1. Type 2 diabetes mellitus with diabetic nephropathy, without long-term current use of insulin (HCC)    - Applauded on A1C of 5.5, but given significant weight loss due to appetite suppression with Mounjaro will lower dose to 10 mg weekly  - Concern for worsening nephropathy despite normal renal panel, and recommend  continuing good glycemic and blood pressure control  - Also to limit NSAID medications, and will start Lisinopril at 5 mg daily for both renal protection and hypertension  - Tirzepatide (MOUNJARO) 10 MG/0.5ML Subcutaneous Solution Pen-injector; Inject 10 mg into the skin once a week.  Dispense: 2 mL; Refill: 0    2. Essential hypertension    - Lower side of normal, and will stop Verapamil and add Lisinopril 5 mg daily to Metoprolol to help with renal protection  - Will monitor her blood pressure closely at home and send a message with her readings in the next two weeks, but call sooner if elevated     3. Mixed hyperlipidemia    -Worsening lipid panel today, and does report she could take her pravastatin more consistently  - Continue pravastatin 20 mg daily and repeat lipid panel in 3 months, but if still elevated at that time plan increasing dose of pravastatin  - Lipid Panel [E]; Future    4. Dysuria    -Check urinalysis with culture reflex  - Urinalysis with Culture Reflex [E]; Future    5. Hypothyroidism, unspecified type    -Well-controlled on [place] Thyroid 120 mg daily    6. Chemotherapy-induced neuropathy (HCC)    -Follow-up with orthopedic specialist as scheduled, but to consider seeing neurologist or physiatrist if not improving    7. Endometrial cancer (HCC)    - Continue follow-up with gyn/onc team    Aleisha Ramirez DO  08/12/24  9:41 AM

## 2024-08-13 ENCOUNTER — TELEPHONE (OUTPATIENT)
Dept: ORTHOPEDICS CLINIC | Facility: CLINIC | Age: 69
End: 2024-08-13

## 2024-08-13 NOTE — TELEPHONE ENCOUNTER
XR ordered per ortho protocol. XR scheduled and patient was notified via [employer] to let them know that they should arrive 15-20 minutes early, in order for them to complete imaging.

## 2024-08-14 ENCOUNTER — HOSPITAL ENCOUNTER (OUTPATIENT)
Dept: GENERAL RADIOLOGY | Age: 69
Discharge: HOME | End: 2024-08-14
Attending: PHYSICIAN ASSISTANT
Payer: COMMERCIAL

## 2024-08-14 ENCOUNTER — OFFICE VISIT (OUTPATIENT)
Dept: ORTHOPEDICS CLINIC | Facility: CLINIC | Age: 69
End: 2024-08-14
Payer: COMMERCIAL

## 2024-08-14 VITALS — HEIGHT: 68 IN | BODY MASS INDEX: 19.4 KG/M2 | WEIGHT: 128 LBS

## 2024-08-14 DIAGNOSIS — Z87.81 STATUS POST ORIF OF FRACTURE OF ANKLE: ICD-10-CM

## 2024-08-14 DIAGNOSIS — Z09 FOLLOW-UP EXAMINATION FOLLOWING TREATMENT OF FRACTURE: Primary | ICD-10-CM

## 2024-08-14 DIAGNOSIS — Z98.890 STATUS POST ORIF OF FRACTURE OF ANKLE: ICD-10-CM

## 2024-08-14 DIAGNOSIS — Z48.89 AFTERCARE FOLLOWING SURGERY: ICD-10-CM

## 2024-08-14 PROCEDURE — 3008F BODY MASS INDEX DOCD: CPT | Performed by: ORTHOPAEDIC SURGERY

## 2024-08-14 PROCEDURE — 99213 OFFICE O/P EST LOW 20 MIN: CPT | Performed by: ORTHOPAEDIC SURGERY

## 2024-08-14 PROCEDURE — 73610 X-RAY EXAM OF ANKLE: CPT | Performed by: PHYSICIAN ASSISTANT

## 2024-08-14 NOTE — PROGRESS NOTES
[facility] New Patient Note      Chief Complaint   Patient presents with    Ankle Pain     RIGHT ANKLE PAIN- Did have previous fx has had some pain since that time. Currently in PT, but over the last few weeks she has had a vertigo episode, so hasn't been able to do as much.       History: Blanquita is a 68-year-old female presenting with her daughter for postop follow-up approximately 3 and half months after undergoing ORIF of the right ankle bimalleolar fracture.  She has been weightbearing as tolerated in a regular street shoe but has been struggling with balance issues reportedly due to vertigo.  She denies any new pains but continues to report some stiffness and mild residual swelling.    Past Medical History:    Allergic rhinitis    Anxiety    Arrhythmia    Arthritis    hips    Atherosclerosis of coronary artery    Cancer (HCC)    endometrial cancer stage 1C    Cataract    Depression    Diabetes (HCC)    Disorder of thyroid    hypothyroid    Essential hypertension    Hearing impairment    [place] and has hearing aids    High blood pressure    High cholesterol    Hyperlipidemia    Hypertension    Hypothyroidism    Muscle weakness    Neuropathy    chemo    Osteoarthritis    Personal history of antineoplastic chemotherapy    currently being treated    PONV (postoperative nausea and vomiting)    Sleep apnea    Spinal stenosis    Thyroid disease    Vertigo    Visual impairment    readers     Past Surgical History:   Procedure Laterality Date    Adenoidectomy      Bilat salpingo-oophorect w/ omentect, total abdom hyste  2023      x 3    Cataract Bilateral 2017    Dr. Perkins    Colonoscopy  2021    A few small colon polyps removed.  Scar and previous SPOT ink across from IC valve.  No residual polyp.  biopsies taken.     Colonoscopy      Endometrial ablation  2009    Hip replacement surgery  2018    Hysterectomy  2023    Tonsillectomy      Total hip replacement Left  04/17/2017    Dr. Del Cid     Total hip replacement Right 06/21/2017    Dr. Del Cid     Current Outpatient Medications   Medication Sig Dispense Refill    Tirzepatide (MOUNJARO) 10 MG/0.5ML Subcutaneous Solution Pen-injector Inject 10 mg into the skin once a week. 2 mL 0    clobetasol 0.05 % External Ointment Apply 2 x per day for 1 week, then 1 x per day for 1 week, then 2 - 3 x per week as directed 45 g 2    FLUoxetine HCl 40 MG Oral Cap Take 2 capsules (80 mg total) by mouth daily. 180 capsule 3    thyroid 120 MG Oral Tab Take 1 tablet (120 mg total) by mouth daily. 90 tablet 2    lisinopril 5 MG Oral Tab Take 1 tablet (5 mg total) by mouth daily. 90 tablet 0    metoprolol succinate ER 50 MG Oral Tablet 24 Hr Take 1 tablet (50 mg total) by mouth daily. 90 tablet 3    polyethylene glycol, PEG 3350, 17 g Oral Powd Pack Take 17 g by mouth daily.      pravastatin 20 MG Oral Tab Take 1 tablet (20 mg total) by mouth nightly.      traMADol 50 MG Oral Tab Take 2 tablets (100 mg total) by mouth every 6 (six) hours as needed for Pain. 60 tablet 0    zinc sulfate 220 (50 Zn) MG Oral Cap Take 1 capsule (220 mg total) by mouth daily.      Turmeric (QC TUMERIC COMPLEX OR) Take by mouth.      Probiotic Product (PROBIOTIC DAILY OR) Take by mouth once daily.      Cholecalciferol (VITAMIN D-3 OR) Take 15,000 Units by mouth daily.       aspirin 81 MG Oral Tab Take 1 tablet (81 mg total) by mouth daily.  0    MAGNESIUM OR Take by mouth.      Ascorbic Acid (VITAMIN C OR) Take by mouth.      Vitamin B-12 500 MCG Oral Tab Take 1 tablet (500 mcg total) by mouth daily.      COENZYME Q-10 OR Take 1 tablet by mouth daily.       Allergies   Allergen Reactions    Amoxicillin FEVER    Sulfa Antibiotics OTHER (SEE COMMENTS)     Don't remember     Family History   Problem Relation Age of Onset    Heart Disease Father     Cancer Mother         Endometrial, liver, and uterer, colon    Endometrial Cancer Mother         treated with radiation and  surgery    Colon Cancer Mother     Colon Cancer Brother     No Known Problems Maternal Grandmother         lived to age 95    Cancer Maternal Grandfather 78        leukemia    Heart Disease Paternal Grandmother     Stroke Paternal Grandmother 68    Lung Disorder Paternal Grandfather         emphysema    Breast Cancer Neg     Ovarian Cancer Neg      Social History     Occupational History    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[place]   Tobacco Use    Smoking status: Never    Smokeless tobacco: Never   Vaping Use    Vaping status: Never Used   Substance and Sexual Activity    Alcohol use: No    Drug use: No    Sexual activity: Not Currently        ROS:  Complete ROS reviewed by me and non-contributory to the chief complaint except as mentioned above.    Physical Exam:    Ht 5' 8\" (1.727 m)   Wt 128 lb (58.1 kg)   BMI 19.46 kg/m²   Right ankle postsurgical incisions are well-healed.  Minimal residual edema with trace venous stasis discoloration on the right more so than the left.  There is trace loss of terminal dorsiflexion and plantarflexion actively in comparison to the left with better motion passively.  Medial and lateral malleoli are nontender to palpation.  Neurovascular status is intact on sensory, motor and perfusion assessment distally.    Imaging: Multiple views right ankle continues to demonstrate significant disuse osteopenia and demineralization with no interval change in hardware position stabilizing a bimalleolar right ankle fracture.  Fracture lines are no longer easily visualized.    XR ANKLE (MIN 3 VIEWS), RIGHT (CPT=73610)    Result Date: 8/14/2024  CONCLUSION:  Status post ORIF of distal fibula and medial malleolus.  Osseous structures are demineralized.   LOCATION:  [facility]   Dictated by (CST): Jerzy Frye MD on 8/14/2024 at 2:37 PM     Finalized by (CST): Jerzy Frye MD on 8/14/2024 at 2:38 PM          Assessment/Diagnoses:  Diagnoses and all orders for this  visit:    Follow-up examination following treatment of fracture    Status post ORIF of fracture of ankle        Plan:  I reviewed imaging and exam findings with the patient and her daughter.  Clinical and radiographic signs of a healed bimalleolar fracture noted.  There is unfortunately fairly significant disuse osteopenia and demineralization which should stabilize with return to loadbearing.  Specific stretching exercises were reviewed which could even be performed in the wheelchair.  She is struggling with balance issues and has been limited with her degree of ambulation.  Fall prevention is the first priority with gradual progression of her activity levels as tolerated.  Continue to focus on terminal range of motion is also recommended.  At this point I see no need for repeat x-rays unless she has new symptoms or concerns.  All questions were answered and she and her daughter verbalized understanding and appreciation.  She may continue with PT and would be happy to renew any additional treatments recommended by her PT/OT providers until she is back to baseline.      Genoveva Cano MD, [facility]  Orthopaedic Surgery   Sports Medicine/Knee and Shoulder  [facility]  t: [phone]  f: [phone]           This document was partially prepared using Dragon Medical voice recognition software.  Although every attempt is made to correct errors during dictation, discrepancies may still exist.

## 2024-08-19 RX ORDER — PRAVASTATIN SODIUM 20 MG
20 TABLET ORAL NIGHTLY
Qty: 90 TABLET | Refills: 0 | Status: SHIPPED | OUTPATIENT
Start: 2024-08-19

## 2024-08-19 NOTE — TELEPHONE ENCOUNTER
Medication historical, please advise.     Requested Prescriptions   Pending Prescriptions Disp Refills    pravastatin 20 MG Oral Tab  0     Sig: Take 1 tablet (20 mg total) by mouth nightly.       Cholesterol Medication Protocol Passed - 8/14/2024  3:09 AM        Passed - ALT < 80     Lab Results   Component Value Date    ALT 28 04/12/2024             Passed - ALT resulted within past year        Passed - Lipid panel within past 12 months     Lab Results   Component Value Date    CHOLEST 241 (H) 08/08/2024    TRIG 180 (H) 08/08/2024    HDL 40 08/08/2024     (H) 08/08/2024    VLDL 36 (H) 08/08/2024    NONHDLC 201 (H) 08/08/2024             Passed - In person appointment or virtual visit in the past 12 mos or appointment in next 3 mos     Recent Outpatient Visits              5 days ago Follow-up examination following treatment of fracture    [address] [place]    Office Visit    1 week ago Type 2 diabetes mellitus with diabetic nephropathy, without long-term current use of insulin ([facility])    [facility] Aleisha Torre DO    Office Visit    1 month ago Aftercare following surgery    [address] Jessica Lopez PA-C    Office Visit    2 months ago Aftercare following surgery    [address] MD    Office Visit    3 months ago Aftercare following surgery    [address] M[address] DPM    Office Visit                               Recent Outpatient Visits              5 days ago Follow-up examination following treatment of fracture    [address]    Office Visit    1 week ago Type 2 diabetes mellitus with diabetic nephropathy, without long-term current use of insulin ([facility])    [facility] Aleisha Ramirez DO    Office Visit    1 month ago Aftercare following surgery    [facility], [address] [place] [address] Jessica Lopez PA-C    Office Visit    2 months ago Aftercare following surgery    [facility], [address] [place] [address] Genoveva Cano MD    Office Visit    3 months ago Aftercare following surgery    [facility], [address] [place] [address], Cyndee Castaneda, DPM    Office Visit

## 2024-09-24 ENCOUNTER — MED REC SCAN ONLY (OUTPATIENT)
Dept: ORTHOPEDICS CLINIC | Facility: CLINIC | Age: 69
End: 2024-09-24

## 2024-10-16 DIAGNOSIS — R80.9 TYPE 2 DIABETES MELLITUS WITH MICROALBUMINURIA, WITHOUT LONG-TERM CURRENT USE OF INSULIN (HCC): ICD-10-CM

## 2024-10-16 DIAGNOSIS — E11.29 TYPE 2 DIABETES MELLITUS WITH MICROALBUMINURIA, WITHOUT LONG-TERM CURRENT USE OF INSULIN (HCC): ICD-10-CM

## 2024-10-16 DIAGNOSIS — E11.21 TYPE 2 DIABETES MELLITUS WITH DIABETIC NEPHROPATHY, WITHOUT LONG-TERM CURRENT USE OF INSULIN (HCC): ICD-10-CM

## 2024-10-21 DIAGNOSIS — R80.9 TYPE 2 DIABETES MELLITUS WITH MICROALBUMINURIA, WITHOUT LONG-TERM CURRENT USE OF INSULIN (HCC): ICD-10-CM

## 2024-10-21 DIAGNOSIS — E11.29 TYPE 2 DIABETES MELLITUS WITH MICROALBUMINURIA, WITHOUT LONG-TERM CURRENT USE OF INSULIN (HCC): ICD-10-CM

## 2024-10-22 NOTE — TELEPHONE ENCOUNTER
Dear Nursing Staff,    Please verify which strength of Mounjaro patient is taking?Dr. Ramirez decreased dose of Mounjaro to 10 mg sent 08/12/2024 to [facility]'s pharmacy,thanks

## 2024-10-23 RX ORDER — TIRZEPATIDE 10 MG/.5ML
10 INJECTION, SOLUTION SUBCUTANEOUS WEEKLY
Qty: 6 ML | Refills: 0 | Status: SHIPPED | OUTPATIENT
Start: 2024-10-23

## 2024-11-25 RX ORDER — LISINOPRIL 5 MG/1
5 TABLET ORAL DAILY
Qty: 90 TABLET | Refills: 3 | Status: SHIPPED | OUTPATIENT
Start: 2024-11-25

## 2024-11-25 RX ORDER — PRAVASTATIN SODIUM 20 MG
20 TABLET ORAL NIGHTLY
Qty: 90 TABLET | Refills: 3 | Status: SHIPPED | OUTPATIENT
Start: 2024-11-25

## 2024-11-25 NOTE — TELEPHONE ENCOUNTER
Refill passed per [facility] protocol.  Requested Prescriptions   Pending Prescriptions Disp Refills    PRAVASTATIN 20 MG Oral Tab [Pharmacy Med Name: PRAVASTATIN 20MG TABLETS] 90 tablet 0     Sig: TAKE 1 TABLET(20 MG) BY MOUTH EVERY NIGHT       Cholesterol Medication Protocol Passed - 11/25/2024 10:16 AM        Passed - ALT < 80     Lab Results   Component Value Date    ALT 28 04/12/2024             Passed - ALT resulted within past year        Passed - Lipid panel within past 12 months     Lab Results   Component Value Date    CHOLEST 241 (H) 08/08/2024    TRIG 180 (H) 08/08/2024    HDL 40 08/08/2024     (H) 08/08/2024    VLDL 36 (H) 08/08/2024    NONHDLC 201 (H) 08/08/2024             Passed - In person appointment or virtual visit in the past 12 mos or appointment in next 3 mos     Recent Outpatient Visits              3 months ago Follow-up examination following treatment of fracture    [address] Gen[address] Cano [address]    Office Visit    3 months ago Type 2 diabetes mellitus with diabetic nephropathy, without long-term current use of insulin (HCC)    [facility] Aleisha Ramirez DO    Office Visit    4 months ago Aftercare following surgery    [address] Jessica Lopez PA-C    Office Visit    5 months ago Aftercare following surgery    [address] Genove[address] Caon MD    Office Visit    6 months ago Aftercare following surgery    [address] D[address]    Office Visit          Future Appointments         Provider Department Appt Notes    In 4 weeks [facility] CT RM[facility] pt aware to arrive 1hr prior to drink CT contrast-DM 11/25/24                      LISINOPRIL 5 MG Oral Tab [Pharmacy Med Name: LISINOPRIL 5MG TABLETS] 90 tablet 0     Sig: TAKE 1  TABLET(5 MG) BY MOUTH DAILY       Hypertension Medications Protocol Passed - 11/25/2024 10:16 AM        Passed - CMP or BMP in past 12 months        Passed - Last BP reading less than 140/90     BP Readings from Last 1 Encounters:   08/12/24 108/60               Passed - In person appointment or virtual visit in the past 12 mos or appointment in next 3 mos     Recent Outpatient Visits              3 months ago Follow-up examination following treatment of fracture    [facility] [address] [place], [place] [address]Shahab Kellen, MD    Office Visit    3 months ago Type 2 diabetes mellitus with diabetic nephropathy, without long-term current use of insulin ([facility])    [facility] Aleisha Ramirez DO    Office Visit    4 months ago Aftercare following surgery    [facility] [address] [place] [address] [place] Jessica Lopez PA-C    Office Visit    5 months ago Aftercare following surgery    [facility] [address] [place], [place] [address]Shahab Kellen, MD    Office Visit    6 months ago Aftercare following surgery    [facility] [address] [place], [place] [address]Kend[place] Jennifer M., DPM    Office Visit          Future Appointments         Provider Department Appt Notes    In 4 weeks [facility] CT [address] CT [facility] pt aware to arrive 1hr prior to drink CT contrast-DM 11/25/24                    Passed - EGFRCR or GFRNAA > 50     GFR Evaluation  EGFRCR: 95 , resulted on 8/8/2024             Recent Outpatient Visits              3 months ago Follow-up examination following treatment of fracture    [facility] [address] [place], [place] [address]Shahab Kellen, MD    Office Visit    3 months ago Type 2 diabetes mellitus with diabetic nephropathy, without long-term current use of insulin (HCC)    [facility] Aleisha Ramirez DO    Office Visit    4 months ago Aftercare following surgery     [facility], [address] [place] [address] Jessica Lopez PA-C    Office Visit    5 months ago Aftercare following surgery    [facility], [address] [place] [address], [place] Genoveva Cano MD    Office Visit    6 months ago Aftercare following surgery    [facility], [address] [place] [address], Cyndee Castaneda DPM    Office Visit          Future Appointments         Provider Department Appt Notes    In 4 weeks [facility] CT RM1 [facility] pt aware to arrive 1hr prior to drink CT contrast-DM 11/25/24

## 2024-12-23 ENCOUNTER — HOSPITAL ENCOUNTER (OUTPATIENT)
Dept: CT IMAGING | Facility: HOSPITAL | Age: 69
Discharge: HOME | End: 2024-12-23
Payer: COMMERCIAL

## 2024-12-23 DIAGNOSIS — C55 UTERINE CANCER (HCC): ICD-10-CM

## 2024-12-23 LAB
CREAT BLD-MCNC: 0.7 MG/DL
EGFRCR SERPLBLD CKD-EPI 2021: 93 ML/MIN/1.73M2 (ref 60–?)

## 2024-12-23 PROCEDURE — 74177 CT ABD & PELVIS W/CONTRAST: CPT

## 2024-12-23 PROCEDURE — 71260 CT THORAX DX C+: CPT

## 2024-12-23 PROCEDURE — 82565 ASSAY OF CREATININE: CPT

## 2024-12-26 ENCOUNTER — LAB ENCOUNTER (OUTPATIENT)
Dept: LAB | Age: 69
End: 2024-12-26
Payer: COMMERCIAL

## 2024-12-26 DIAGNOSIS — C55 UTERINE CANCER (HCC): Primary | ICD-10-CM

## 2024-12-26 LAB
BUN BLD-MCNC: 27 MG/DL (ref 9–23)
CANCER AG125 SERPL-ACNC: 8 U/ML (ref ?–30.2)
CANCER AG19-9 SERPL-ACNC: 10.7 U/ML (ref ?–35)
CEA SERPL-MCNC: 4 NG/ML (ref ?–5)

## 2024-12-26 PROCEDURE — 84520 ASSAY OF UREA NITROGEN: CPT

## 2024-12-26 PROCEDURE — 82378 CARCINOEMBRYONIC ANTIGEN: CPT

## 2024-12-26 PROCEDURE — 86301 IMMUNOASSAY TUMOR CA 19-9: CPT

## 2024-12-26 PROCEDURE — 86304 IMMUNOASSAY TUMOR CA 125: CPT

## 2024-12-26 PROCEDURE — 36415 COLL VENOUS BLD VENIPUNCTURE: CPT

## 2025-01-09 ENCOUNTER — TELEPHONE (OUTPATIENT)
Facility: CLINIC | Age: 70
End: 2025-01-09

## 2025-01-10 ENCOUNTER — LAB ENCOUNTER (OUTPATIENT)
Dept: LAB | Age: 70
End: 2025-01-10
Attending: FAMILY MEDICINE
Payer: COMMERCIAL

## 2025-01-10 ENCOUNTER — EKG ENCOUNTER (OUTPATIENT)
Dept: LAB | Age: 70
End: 2025-01-10
Attending: FAMILY MEDICINE
Payer: COMMERCIAL

## 2025-01-10 ENCOUNTER — PATIENT MESSAGE (OUTPATIENT)
Facility: CLINIC | Age: 70
End: 2025-01-10

## 2025-01-10 DIAGNOSIS — R80.9 TYPE 2 DIABETES MELLITUS WITH MICROALBUMINURIA, WITHOUT LONG-TERM CURRENT USE OF INSULIN (HCC): ICD-10-CM

## 2025-01-10 DIAGNOSIS — N20.1 URETERAL STONE: Primary | ICD-10-CM

## 2025-01-10 DIAGNOSIS — E11.29 TYPE 2 DIABETES MELLITUS WITH MICROALBUMINURIA, WITHOUT LONG-TERM CURRENT USE OF INSULIN (HCC): ICD-10-CM

## 2025-01-10 DIAGNOSIS — Z01.810 PRE-OPERATIVE CARDIOVASCULAR EXAMINATION: ICD-10-CM

## 2025-01-10 LAB
ALBUMIN SERPL-MCNC: 4.4 G/DL (ref 3.2–4.8)
ALBUMIN/GLOB SERPL: 1.6 {RATIO} (ref 1–2)
ALP LIVER SERPL-CCNC: 122 U/L
ALT SERPL-CCNC: 21 U/L
ANION GAP SERPL CALC-SCNC: 8 MMOL/L (ref 0–18)
AST SERPL-CCNC: 18 U/L (ref ?–34)
ATRIAL RATE: 84 BPM
BASOPHILS # BLD AUTO: 0.04 X10(3) UL (ref 0–0.2)
BASOPHILS NFR BLD AUTO: 0.5 %
BILIRUB SERPL-MCNC: 0.4 MG/DL (ref 0.2–1.1)
BUN BLD-MCNC: 19 MG/DL (ref 9–23)
BUN/CREAT SERPL: 33.9 (ref 10–20)
CALCIUM BLD-MCNC: 9.7 MG/DL (ref 8.7–10.4)
CHLORIDE SERPL-SCNC: 109 MMOL/L (ref 98–112)
CO2 SERPL-SCNC: 27 MMOL/L (ref 21–32)
CREAT BLD-MCNC: 0.56 MG/DL
DEPRECATED RDW RBC AUTO: 46.6 FL (ref 35.1–46.3)
EGFRCR SERPLBLD CKD-EPI 2021: 98 ML/MIN/1.73M2 (ref 60–?)
EOSINOPHIL # BLD AUTO: 0.32 X10(3) UL (ref 0–0.7)
EOSINOPHIL NFR BLD AUTO: 4.1 %
ERYTHROCYTE [DISTWIDTH] IN BLOOD BY AUTOMATED COUNT: 13.5 % (ref 11–15)
FASTING STATUS PATIENT QL REPORTED: NO
GLOBULIN PLAS-MCNC: 2.8 G/DL (ref 2–3.5)
GLUCOSE BLD-MCNC: 89 MG/DL (ref 70–99)
HCT VFR BLD AUTO: 39 %
HGB BLD-MCNC: 13.2 G/DL
IMM GRANULOCYTES # BLD AUTO: 0.01 X10(3) UL (ref 0–1)
IMM GRANULOCYTES NFR BLD: 0.1 %
LYMPHOCYTES # BLD AUTO: 2.42 X10(3) UL (ref 1–4)
LYMPHOCYTES NFR BLD AUTO: 31.3 %
MCH RBC QN AUTO: 31.8 PG (ref 26–34)
MCHC RBC AUTO-ENTMCNC: 33.8 G/DL (ref 31–37)
MCV RBC AUTO: 94 FL
MONOCYTES # BLD AUTO: 0.57 X10(3) UL (ref 0.1–1)
MONOCYTES NFR BLD AUTO: 7.4 %
NEUTROPHILS # BLD AUTO: 4.37 X10 (3) UL (ref 1.5–7.7)
NEUTROPHILS # BLD AUTO: 4.37 X10(3) UL (ref 1.5–7.7)
NEUTROPHILS NFR BLD AUTO: 56.6 %
OSMOLALITY SERPL CALC.SUM OF ELEC: 300 MOSM/KG (ref 275–295)
P AXIS: 60 DEGREES
P-R INTERVAL: 180 MS
PLATELET # BLD AUTO: 250 10(3)UL (ref 150–450)
POTASSIUM SERPL-SCNC: 4.1 MMOL/L (ref 3.5–5.1)
PROT SERPL-MCNC: 7.2 G/DL (ref 5.7–8.2)
Q-T INTERVAL: 424 MS
QRS DURATION: 140 MS
QTC CALCULATION (BEZET): 501 MS
R AXIS: -44 DEGREES
RBC # BLD AUTO: 4.15 X10(6)UL
SODIUM SERPL-SCNC: 144 MMOL/L (ref 136–145)
T AXIS: 114 DEGREES
VENTRICULAR RATE: 84 BPM
WBC # BLD AUTO: 7.7 X10(3) UL (ref 4–11)

## 2025-01-10 PROCEDURE — 85025 COMPLETE CBC W/AUTO DIFF WBC: CPT

## 2025-01-10 PROCEDURE — 80053 COMPREHEN METABOLIC PANEL: CPT

## 2025-01-10 PROCEDURE — 36415 COLL VENOUS BLD VENIPUNCTURE: CPT

## 2025-01-10 PROCEDURE — 93005 ELECTROCARDIOGRAM TRACING: CPT

## 2025-01-10 PROCEDURE — 93010 ELECTROCARDIOGRAM REPORT: CPT | Performed by: INTERNAL MEDICINE

## 2025-01-10 NOTE — TELEPHONE ENCOUNTER
Patient sent separate [employer] message requesting Mounjaro refill to be sent to [facility] in [place], she has 1 dose left.

## 2025-01-10 NOTE — TELEPHONE ENCOUNTER
Called the patient to set up an appointment for her  pre op before her 1/28/25 appointment. When patient calls back can be transferred to me at [phone]

## 2025-01-14 RX ORDER — TIRZEPATIDE 10 MG/.5ML
10 INJECTION, SOLUTION SUBCUTANEOUS WEEKLY
Qty: 6 ML | Refills: 0 | Status: SHIPPED | OUTPATIENT
Start: 2025-01-14

## 2025-01-14 NOTE — TELEPHONE ENCOUNTER
Please kindly review; protocol failed or medication has no protocol attached.     How many refills would you like to provide for patient?    Recent Visits  Date Type Provider Dept   08/12/24 Office Visit Aleisha Ramirez DO [facility]   04/23/24 Office Visit Aleisha Ramirez DO [facility]     Future Appointments  Date Type Provider Dept   01/21/25 Appointment Aleisha Ramirez DO [facility]     Requested Prescriptions   Pending Prescriptions Disp Refills    Tirzepatide (MOUNJARO) 10 MG/0.5ML Subcutaneous Solution Auto-injector 6 mL 0     Sig: Inject 10 mg into the skin once a week.       Diabetes Medication Protocol Passed - 1/14/2025  4:46 PM        Passed - Last A1C < 7.5 and within past 6 months     Lab Results   Component Value Date    A1C 5.5 08/08/2024             Passed - In person appointment or virtual visit in the past 6 mos or appointment in next 3 mos     Recent Outpatient Visits              5 months ago Follow-up examination following treatment of fracture    [address] Genoveva Cano MD    Office Visit    5 months ago Type 2 diabetes mellitus with diabetic nephropathy, without long-term current use of insulin ([facility])    [facility] Aleisha Ramirez DO    Office Visit    6 months ago Aftercare following surgery    [address] Jessica Lopez PA-C    Office Visit    7 months ago Aftercare following surgery    [address] Genoveva Cano MD    Office Visit    8 months ago Aftercare following surgery    [address] D[address]    Office Visit          Future Appointments         Provider Department Appt Notes    In 1 week Aleisha Ramirez DO [facility] Surgery with Dr. Rahman   1/28/25 labs and EKG done. Needs H&P                    Passed -  Microalbumin procedure in past 12 months or taking ACE/ARB        Passed - EGFRCR or GFRNAA > 50     GFR Evaluation  EGFRCR: 98 , resulted on 1/10/2025          Passed - GFR in the past 12 months        Passed - Medication is active on med list             Future Appointments         Provider Department Appt Notes    In 1 week Aleisha Ramirez DO [facility] Surgery with Dr. Rahman   1/28/25 labs and EKG done. Needs H&P          Recent Outpatient Visits              5 months ago Follow-up examination following treatment of fracture    [address] [place]    Office Visit    5 months ago Type 2 diabetes mellitus with diabetic nephropathy, without long-term current use of insulin (HCC)    [facility] Aleisha Ramirez DO    Office Visit    6 months ago Aftercare following surgery    [address] Jessica Lopez PA-C    Office Visit    7 months ago Aftercare following surgery    [address] [place]    Office Visit    8 months ago Aftercare following surgery    [address] DPM    Office Visit

## 2025-01-21 ENCOUNTER — OFFICE VISIT (OUTPATIENT)
Facility: CLINIC | Age: 70
End: 2025-01-21
Payer: COMMERCIAL

## 2025-01-21 VITALS
HEIGHT: 68 IN | WEIGHT: 120 LBS | BODY MASS INDEX: 18.19 KG/M2 | HEART RATE: 73 BPM | OXYGEN SATURATION: 98 % | DIASTOLIC BLOOD PRESSURE: 74 MMHG | SYSTOLIC BLOOD PRESSURE: 126 MMHG

## 2025-01-21 DIAGNOSIS — Z01.818 PREOP EXAMINATION: Primary | ICD-10-CM

## 2025-01-21 DIAGNOSIS — E78.2 MIXED HYPERLIPIDEMIA: ICD-10-CM

## 2025-01-21 DIAGNOSIS — N13.2 URETERAL STONE WITH HYDRONEPHROSIS: ICD-10-CM

## 2025-01-21 DIAGNOSIS — T45.1X5A CHEMOTHERAPY-INDUCED NEUROPATHY (HCC): ICD-10-CM

## 2025-01-21 DIAGNOSIS — E03.9 HYPOTHYROIDISM, UNSPECIFIED TYPE: ICD-10-CM

## 2025-01-21 DIAGNOSIS — G62.0 CHEMOTHERAPY-INDUCED NEUROPATHY (HCC): ICD-10-CM

## 2025-01-21 DIAGNOSIS — R80.9 POSITIVE FOR MACROALBUMINURIA: ICD-10-CM

## 2025-01-21 DIAGNOSIS — I10 ESSENTIAL HYPERTENSION: ICD-10-CM

## 2025-01-21 DIAGNOSIS — E11.21 TYPE 2 DIABETES MELLITUS WITH DIABETIC NEPHROPATHY, WITHOUT LONG-TERM CURRENT USE OF INSULIN (HCC): ICD-10-CM

## 2025-01-21 PROCEDURE — 3078F DIAST BP <80 MM HG: CPT | Performed by: FAMILY MEDICINE

## 2025-01-21 PROCEDURE — 3008F BODY MASS INDEX DOCD: CPT | Performed by: FAMILY MEDICINE

## 2025-01-21 PROCEDURE — 99214 OFFICE O/P EST MOD 30 MIN: CPT | Performed by: FAMILY MEDICINE

## 2025-01-21 PROCEDURE — 3074F SYST BP LT 130 MM HG: CPT | Performed by: FAMILY MEDICINE

## 2025-01-21 RX ORDER — NITROFURANTOIN MACROCRYSTALS 100 MG/1
CAPSULE ORAL
COMMUNITY
Start: 2025-01-15

## 2025-01-21 NOTE — PROGRESS NOTES
Blanquita Hernandez is a 69 year old female.  Chief Complaint   Patient presents with    Pre-Op Exam     Cystoscopy, left retrograde pyelogram, left ureteroscopy, laser lithotripsy, stone extraction with Dr. Rahman on 1/28/25       HPI:   PREOP EXAM    PRE-OP Physical  What is the full name of procedure/ surgery? Cystoscopy, left retrograde pyelogram, left lithotripsy, stone extraction   Date surgery or procedure is being done? 1/28/2025  What is the doctor’s full name  that is doing the surgery? Dr. Esvin Rahman  What hospital or facility will the procedure occur? [facility]   She was diagnosed with a kidney stone on a routine screening CT scan. She did have some \"sand\" in the urine a few months ago, but otherwise no symptoms with this. Denies any abdominal pain, flank pain, fevers, or chills.   She is having the surgery under general anesthesia. She did have significant vomiting after an endometrial ablation many years ago, but has not had any issues since then. Denies any family history of malignant hyperthermia.   She has lost weight on the Mounjaro as she is snacking less, but she does not feel too much appetite suppression. She does eat three meals a day, but does not eat as much with her meals.   ALLERGIES:  Allergies[1]    Immunization History   Administered Date(s) Administered    Covid-19 Vaccine Moderna 100 mcg/0.5 ml 01/28/2021, 03/01/2021, 11/08/2021    Covid-19 Vaccine Moderna 50 Mcg/0.25 Ml 03/31/2022    Covid-19 Vaccine Moderna Bivalent 50mcg/0.5mL 12+ years 09/29/2022    FLU VAC High Dose 65 YRS & Older PRSV Free ([id number]) 02/25/2020, 12/29/2020, 10/26/2021, 09/28/2023    FLULAVAL 6 months & older 0.5 ml Prefilled syringe ([id number]) 09/10/2018    Influenza 09/01/2017    Pneumococcal (Prevnar 13) 02/25/2020    Pneumococcal Conjugate PCV20 05/30/2023    Pneumovax 23 01/05/2018    TDAP 09/10/2018    Zoster Vaccine Recombinant Adjuvanted (Shingrix) 01/15/2018, 05/05/2018, 07/15/2018,  12/15/2018       Past Surgical History:   Procedure Laterality Date    Adenoidectomy      Bilat salpingo-oophorect w/ omentect, total abdom hyste  2023      x 3    Cataract Bilateral 2017    Dr. Perkins    Colonoscopy  2021    A few small colon polyps removed.  Scar and previous SPOT ink across from IC valve.  No residual polyp.  biopsies taken.     Colonoscopy      Endometrial ablation  2009    Hip replacement surgery  2018    Hysterectomy  2023    Tonsillectomy      Total hip replacement Left 2017    Dr. Del Cid     Total hip replacement Right 2017    Dr. Del Cid     Family Status   Relation Status    Fa     Mo     Bro     MGMA     MGFA     PGMA     PGFA     Bro Alive    NEG (Not Specified)     Family History   Problem Relation Age of Onset    Heart Disease Father     Cancer Mother         Endometrial, liver, and uterer, colon    Endometrial Cancer Mother         treated with radiation and surgery    Colon Cancer Mother     Colon Cancer Brother     No Known Problems Maternal Grandmother         lived to age 95    Cancer Maternal Grandfather 78        leukemia    Heart Disease Paternal Grandmother     Stroke Paternal Grandmother 68    Lung Disorder Paternal Grandfather         emphysema    Breast Cancer Neg     Ovarian Cancer Neg        Current Outpatient Medications   Medication Sig Dispense Refill    Tirzepatide (MOUNJARO) 10 MG/0.5ML Subcutaneous Solution Auto-injector Inject 10 mg into the skin once a week. 6 mL 0    pravastatin 20 MG Oral Tab Take 1 tablet (20 mg total) by mouth nightly. 90 tablet 3    lisinopril 5 MG Oral Tab Take 1 tablet (5 mg total) by mouth daily. 90 tablet 3    clobetasol 0.05 % External Ointment Apply 2 x per day for 1 week, then 1 x per day for 1 week, then 2 - 3 x per week as directed 45 g 2    FLUoxetine HCl 40 MG Oral Cap Take 2 capsules (80 mg total) by mouth daily. 180 capsule 3     thyroid 120 MG Oral Tab Take 1 tablet (120 mg total) by mouth daily. 90 tablet 2    metoprolol succinate ER 50 MG Oral Tablet 24 Hr Take 1 tablet (50 mg total) by mouth daily. 90 tablet 3    zinc sulfate 220 (50 Zn) MG Oral Cap Take 1 capsule (220 mg total) by mouth daily.      Probiotic Product (PROBIOTIC DAILY OR) Take by mouth once daily.      Cholecalciferol (VITAMIN D-3 OR) Take 15,000 Units by mouth daily.       aspirin 81 MG Oral Tab Take 1 tablet (81 mg total) by mouth daily.  0    COENZYME Q-10 OR Take 1 tablet by mouth daily.      MAGNESIUM OR Take by mouth.      Ascorbic Acid (VITAMIN C OR) Take by mouth.      Vitamin B-12 500 MCG Oral Tab Take 1 tablet (500 mcg total) by mouth daily.      nitrofurantoin macrocrystal 100 MG Oral Cap TAKE 1 CAPSULE BY MOUTH EVERY 12 HOURS START 5 DAYS PRIOR TO SURGERY (Patient not taking: Reported on 1/21/2025)      polyethylene glycol, PEG 3350, 17 g Oral Powd Pack Take 17 g by mouth daily.      Turmeric (QC TUMERIC COMPLEX OR) Take by mouth. (Patient not taking: Reported on 1/21/2025)        Past Medical History:    Allergic rhinitis    Anxiety    Arrhythmia    Arthritis    hips    Atherosclerosis of coronary artery    Cancer (HCC)    endometrial cancer stage 1C    Cataract    Depression    Diabetes (HCC)    Disorder of thyroid    hypothyroid    Essential hypertension    Hearing impairment    Cachil DeHe and has hearing aids    High blood pressure    High cholesterol    Hyperlipidemia    Hypertension    Hypothyroidism    Muscle weakness    Neuropathy    chemo    Osteoarthritis    Personal history of antineoplastic chemotherapy    currently being treated    PONV (postoperative nausea and vomiting)    Sleep apnea    Spinal stenosis    Thyroid disease    Vertigo    Visual impairment    readers      Social History:  Social History     Socioeconomic History    Marital status:    Occupational History    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[employer]   Tobacco Use     Smoking status: Never    Smokeless tobacco: Never   Vaping Use    Vaping status: Never Used   Substance and Sexual Activity    Alcohol use: No    Drug use: No    Sexual activity: Not Currently   Other Topics Concern    Caffeine Concern No    Exercise Yes    Seat Belt No    Weight Concern Yes   Social History Narrative    Lives with daughter    Feels safe     Social Drivers of Health     Food Insecurity: No Food Insecurity (12/21/2023)    Food Insecurity     Food Insecurity: Never true   Transportation Needs: No Transportation Needs (12/21/2023)    Transportation Needs     Lack of Transportation: No   Housing Stability: Low Risk  (12/21/2023)    Housing Stability     Housing Instability: No        BP Readings from Last 6 Encounters:   01/21/25 126/74   08/12/24 108/60   04/26/24 (!) 120/97   04/23/24 124/78   04/18/24 101/70   12/22/23 127/75       Wt Readings from Last 6 Encounters:   01/21/25 120 lb (54.4 kg)   08/14/24 128 lb (58.1 kg)   08/12/24 128 lb (58.1 kg)   07/11/24 141 lb (64 kg)   06/13/24 141 lb (64 kg)   05/15/24 141 lb (64 kg)       REVIEW OF SYSTEMS:   GENERAL HEALTH: feels well with no complaints   SKIN: denies any unusual skin lesions or rashes  RESPIRATORY: denies shortness of breath with exertion  CARDIOVASCULAR: denies chest pain on exertion  GI: denies abdominal pain and denies heartburn  NEURO: denies headaches    EXAM:   /74   Pulse 73   Ht 5' 8\" (1.727 m)   Wt 120 lb (54.4 kg)   SpO2 98%   BMI 18.25 kg/m²  Body mass index is 18.25 kg/m².    GENERAL: well developed, well nourished, in no apparent distress   SKIN: no rashes, no suspicious lesions  HEENT: atraumatic, normocephalic, throat clear  NECK: supple, no adenopathy, no bruits  LUNGS: clear to auscultation  CARDIO: RRR without murmur  GI: good bowel sounds, no masses, HSM or tenderness  EXTREMITIES: no cyanosis, clubbing or edema      ASSESSMENT AND PLAN:   Blanquita Hernandez is a 69 year old female presenting for a  pre-operative physical.     1. Preop examination    -Patient is low risk for this moderate risk procedure and medically cleared for surgery   -Hold Aspirin, NSAID's, vitamins, and supplements for one week prior to surgery     2. Ureteral stone with hydronephrosis    - Plans lithotripsy and stone extraction as scheduled     3. Type 2 diabetes mellitus with diabetic nephropathy, without long-term current use of insulin (HCC)    -Very well-controlled on Mounjaro, but will consider lowering the dose to 7.5 mg daily if weight loss continues  - Check A1c and repeat microalbumin creatinine ratio in the next month  - Also to schedule diabetic eye exam as soon as possible  - Hemoglobin A1C (Glycohemoglobin) [E]; Future  - Microalb/Creat Ratio, Random Urine [E]; Future    4. Hypothyroidism, unspecified type    - Continue [place] Thyroid, and will repeat TSH as well  - TSH W Reflex To Free T4 [E]; Future    5. Essential hypertension    - Well-controlled on current regimen, and continue medications leading up to surgery    6. Mixed hyperlipidemia    - Continue pravastatin leading up to and on the same day of surgery, and repeat lipid panel with labs    7. Chemotherapy-induced neuropathy (HCC)    -Referral to neurologist given for further recommendations  - Neuro Referral - In Network    8. Positive for macroalbuminuria    - Will repeat microalbumin:Cr ratio but should be improving with excellent glycemic and blood pressure control as well as avoidance of NSAID's and other nephrotoxic medications  -If still significantly elevated plan nephrology referral      Aleisha Ramirez DO  01/21/25  10:48 AM               [1]   Allergies  Allergen Reactions    Amoxicillin FEVER    Sulfa Antibiotics OTHER (SEE COMMENTS)     Don't remember

## 2025-01-28 ENCOUNTER — LAB REQUISITION (OUTPATIENT)
Dept: LAB | Age: 70
End: 2025-01-28

## 2025-01-28 DIAGNOSIS — N20.0 CALCULUS OF KIDNEY: ICD-10-CM

## 2025-01-28 PROCEDURE — 82365 CALCULUS SPECTROSCOPY: CPT | Performed by: CLINICAL MEDICAL LABORATORY

## 2025-02-01 LAB
APPEARANCE STONE: NORMAL
COMPN STONE: NORMAL
SPECIMEN WT: 24 MG

## 2025-02-07 DIAGNOSIS — R80.9 TYPE 2 DIABETES MELLITUS WITH MICROALBUMINURIA, WITHOUT LONG-TERM CURRENT USE OF INSULIN (HCC): ICD-10-CM

## 2025-02-07 DIAGNOSIS — E11.29 TYPE 2 DIABETES MELLITUS WITH MICROALBUMINURIA, WITHOUT LONG-TERM CURRENT USE OF INSULIN (HCC): ICD-10-CM

## 2025-02-07 NOTE — TELEPHONE ENCOUNTER
Patient sent [employer] message following up on refill request.    Blanquita Hernandez to DAHLIA Pereira Rn Triage (supporting Aleisha Ramirez DO)   SP      2/7/25  8:00 AM  I sent you a message for a Mounjaro refill.  Could you please make it for 3 months.     Thanks,  Blanquita Hernandez

## 2025-02-11 RX ORDER — TIRZEPATIDE 10 MG/.5ML
10 INJECTION, SOLUTION SUBCUTANEOUS WEEKLY
Qty: 6 ML | Refills: 1 | Status: SHIPPED | OUTPATIENT
Start: 2025-02-11

## 2025-02-11 NOTE — TELEPHONE ENCOUNTER
Please review; protocol failed/No Protocol    Patient requesting 3 month supply     Sent [employer] message to patient to complete labs from 01/21/2025    Requested Prescriptions   Pending Prescriptions Disp Refills    Tirzepatide (MOUNJARO) 10 MG/0.5ML Subcutaneous Solution Auto-injector 6 mL 3     Sig: Inject 10 mg into the skin once a week.       Diabetes Medication Protocol Failed - 2/11/2025  3:38 PM        Failed - Last A1C < 7.5 and within past 6 months     Lab Results   Component Value Date    A1C 5.5 08/08/2024             Passed - In person appointment or virtual visit in the past 6 mos or appointment in next 3 mos     Recent Outpatient Visits              3 weeks ago Preop examination    [facility]    Office Visit    6 months ago Follow-up examination following treatment of fracture    [facility] [address] [place], [place] [address]Shahab Kellen, MD    Office Visit    6 months ago Type 2 diabetes mellitus with diabetic nephropathy, without long-term current use of insulin ([facility])    [facility]    Office Visit    7 months ago Aftercare following surgery    [facility] [address] [place], [place] [address]Shahab Shannon M, PA-C    Office Visit    8 months ago Aftercare following surgery    [facility] [address] [place], [place] [address]Shahab Kellen, MD    Office Visit                      Passed - Microalbumin procedure in past 12 months or taking ACE/ARB        Passed - EGFRCR or GFRNAA > 50     GFR Evaluation  EGFRCR: 98 , resulted on 1/10/2025          Passed - GFR in the past 12 months        Passed - Medication is active on med list             Recent Outpatient Visits              3 weeks ago Preop examination    [facility]    Office Visit    6 months ago Follow-up examination following treatment of fracture     [address]    Office Visit    6 months ago Type 2 diabetes mellitus with diabetic nephropathy, without long-term current use of insulin (HCC)    [facility] Aleisha Ramirez DO    Office Visit    7 months ago Aftercare following surgery    [address] Jessica Loepz PA-C    Office Visit    8 months ago Aftercare following surgery    [address] Cano [address]    Office Visit

## 2025-03-17 ENCOUNTER — HOSPITAL ENCOUNTER (OUTPATIENT)
Dept: ULTRASOUND IMAGING | Facility: HOSPITAL | Age: 70
Discharge: HOME | End: 2025-03-17
Attending: UROLOGY
Payer: COMMERCIAL

## 2025-03-17 DIAGNOSIS — N20.0 KIDNEY STONE: ICD-10-CM

## 2025-03-17 PROCEDURE — 76770 US EXAM ABDO BACK WALL COMP: CPT | Performed by: UROLOGY

## (undated) DEVICE — FENESTRATED BIPOLAR FORCEPS: Brand: ENDOWRIST

## (undated) DEVICE — GLOVE SUR 6.5 SENSICARE PI PIP CRM PWD F

## (undated) DEVICE — BLADELESS OBTURATOR: Brand: WECK VISTA

## (undated) DEVICE — TRAY CATH 16FR F INCLUDE BARDX IC COMPLT CARE

## (undated) DEVICE — ENCORE® LATEX ACCLAIM SIZE 6.5, STERILE LATEX POWDER-FREE SURGICAL GLOVE: Brand: ENCORE

## (undated) DEVICE — MYOSURE SINGLE USE SEAL SET

## (undated) DEVICE — ENCORE® LATEX MICRO SIZE 7, STERILE LATEX POWDER-FREE SURGICAL GLOVE: Brand: ENCORE

## (undated) DEVICE — 3M™ STERI-STRIP™ COMPOUND BENZOIN TINCTURE 40 BAGS/CARTON 4 CARTONS/CASE C1544: Brand: 3M™ STERI-STRIP™

## (undated) DEVICE — APPLICATOR SKIN PREP 26ML HI LT ORNG 2% CHG

## (undated) DEVICE — ENSEAL X1 TISSUE SEALER, CURVED JAW, 37 CM SHAFT LENGTH: Brand: ENSEAL

## (undated) DEVICE — 3M™ STERI-DRAPE™ PATIENT ISOLATION DRAPE 1014: Brand: STERI-DRAPE™

## (undated) DEVICE — DISPOSABLE TOURNIQUET CUFF SINGLE BLADDER, DUAL PORT AND QUICK CONNECT CONNECTOR: Brand: COLOR CUFF

## (undated) DEVICE — SOLUTION IRRIG 1000ML 0.9% NACL USP BTL

## (undated) DEVICE — COLUMN DRAPE

## (undated) DEVICE — C-ARM: Brand: UNBRANDED

## (undated) DEVICE — PAD,NON-ADHERENT,3X8,STERILE,LF,1/PK: Brand: MEDLINE

## (undated) DEVICE — PAD,ABDOMINAL,8"X7.5",ST,LF,20/BX: Brand: MEDLINE INDUSTRIES, INC.

## (undated) DEVICE — Device

## (undated) DEVICE — SPLINT CMFRT 4X30IN POLY HI TENSILE

## (undated) DEVICE — PAD POS 36IN DISP SURGYPAD

## (undated) DEVICE — DRAPE,ROBOTICS,STERILE: Brand: MEDLINE

## (undated) DEVICE — SET TUBI Y FL CNTRL INFL/OTFL

## (undated) DEVICE — GLOVE SUR 8.5 SENSICARE PI PIP GRN PWD F

## (undated) DEVICE — C-ARMOR C-ARM EQUIPMENT COVERS CLEAR STERILE UNIVERSAL FIT 12 PER CASE: Brand: C-ARMOR

## (undated) DEVICE — HYSTEROSCOPY: Brand: MEDLINE INDUSTRIES, INC.

## (undated) DEVICE — MEGA SUTURECUT ND: Brand: ENDOWRIST

## (undated) DEVICE — GLOVE SUR 8 SENSICARE PI PIP CRM PWD F

## (undated) DEVICE — MONOPOLAR CURVED SCISSORS: Brand: ENDOWRIST

## (undated) DEVICE — SUT COAT VCRL 2-0 27IN ABSRB UD 26MM CT-2

## (undated) DEVICE — ANTIBACTERIAL UNDYED BRAIDED (POLYGLACTIN 910), SYNTHETIC ABSORBABLE SUTURE: Brand: COATED VICRYL

## (undated) DEVICE — TIP COVER ACCESSORY

## (undated) DEVICE — SUT ETHLN 3-0 18IN PS-1 NABSRB BLK 24MM 3/8 C

## (undated) DEVICE — BANDAGE COHESIVE 4INX5YD TAN E POR SLF ADH

## (undated) DEVICE — SCREW BNE 3.5X22MM CORT HEX DRV RECESS FT ST
Type: IMPLANTABLE DEVICE | Site: ANKLE | Status: NON-FUNCTIONAL
Removed: 2024-04-26

## (undated) DEVICE — SOL  .9 3000ML

## (undated) DEVICE — APPLICATOR PREP 26ML CHG 2% ISO ALC 70%

## (undated) DEVICE — SUTURE MCRYL SZ 4-0 L18IN ABSRB UD L19MM PS-2

## (undated) DEVICE — SLEEVE COMPR MD KNEE LEN SGL USE KENDALL SCD

## (undated) DEVICE — SOCK CNSTR 4IN TNPSL UNV SPEC

## (undated) DEVICE — SUTURE PROL SZ 0 L30IN NONABSORBABLE BLU .

## (undated) DEVICE — ABSORBABLE WOUND CLOSURE DEVICE: Brand: V-LOC 180

## (undated) DEVICE — AIRSEAL TRI-LUMEN LILTERED TUBE SET: Brand: AIRSEAL

## (undated) DEVICE — HANDLE SUR BLU PLAS LT FLX SLIP ON ST DISP

## (undated) DEVICE — JELLY LUB 2OZ GREASELESS FLIP TOP DISP

## (undated) DEVICE — PROXIMATE SKIN STAPLERS (35 WIDE) CONTAINS 35 STAINLESS STEEL STAPLES (FIXED HEAD): Brand: PROXIMATE

## (undated) DEVICE — VCARE MEDIUM, UTERINE MANIPULATOR, VAGINAL-CERVICAL-AHLUWALIA'S-RETRACTOR-ELEVATOR: Brand: VCARE

## (undated) DEVICE — GLOVE SUR 6.5 SENSICARE PI PIP GRN PWD F

## (undated) DEVICE — LAPAROVUE VISIBILITY SYSTEM LAPAROSCOPIC SOLUTIONS: Brand: LAPAROVUE

## (undated) DEVICE — ARM DRAPE

## (undated) DEVICE — SOLUTION IRRIG 3000ML 0.9% NACL FLX CONT

## (undated) DEVICE — PROGRASP FORCEPS: Brand: ENDOWRIST

## (undated) DEVICE — CANNULA SEAL

## (undated) DEVICE — AIRSEAL 5 MM ACCESS PORT AND LOW PROFILE OBTURATOR WITH BLADELESS OPTICAL TIP, 120 MM LENGTH: Brand: AIRSEAL

## (undated) DEVICE — COUNTER NDL 10 CT HLD 20 FOAM BLK ADH

## (undated) DEVICE — GOWN SUR 2XL LEV 4 BLU W/ WHT V NK BND AERO

## (undated) DEVICE — PAD ALC 43.5X24IN PREP  LF

## (undated) DEVICE — ROBOTIC: Brand: MEDLINE INDUSTRIES, INC.

## (undated) DEVICE — INJECTOR MANIP L13CM DIA5MM BLLN TIP KRONNER

## (undated) DEVICE — GAMMEX® PI HYBRID SIZE 7.5, STERILE POWDER-FREE SURGICAL GLOVE, POLYISOPRENE AND NEOPRENE BLEND: Brand: GAMMEX

## (undated) DEVICE — INSUFFLATION NEEDLE TO ESTABLISH PNEUMOPERITONEUM.: Brand: INSUFFLATION NEEDLE

## (undated) DEVICE — SKIN PREP TRAY 4 COMPARTM TRAY: Brand: MEDLINE INDUSTRIES, INC.

## (undated) DEVICE — LOWER EXTREMITY CDS-LF: Brand: MEDLINE INDUSTRIES, INC.

## (undated) DEVICE — SUCTION CANISTER, 3000CC,SAFELINER: Brand: DEROYAL

## (undated) NOTE — MR AVS SNAPSHOT
[address] at [address], [place] [address]  [phone]               Thank you for choosing us for your health care visit with Angely Jimenez DO.   We are glad to serve you and happy to provide you with th Take 1 tablet (500 mg total) by mouth 2 (two) times daily with meals. Commonly known as:  NAPROSYN           PROBIOTIC OR   Take by mouth. thyroid 60 MG Tabs   Take 1 tablet (60 mg total) by mouth daily.    Commonly known as:  PABLITO THYROID If you are confident that your benefit plan will not require a referral or authorization, such as [place], please feel free to schedule your appointment immediately.  However, if you are unsure about the requirements for authorization, please wait including white bread, rice and pasta   Eat plenty of protein, keep the fat content low Sugars:  sodas and sports drinks, candies and desserts   Eat plenty of low-fat dairy products High fat meats and dairy   Choose whole grain products Foods high in sodiu

## (undated) NOTE — LETTER
Date: 11/14/2017    Patient Name: Doug Mccormack          To Whom it may concern: This letter has been written at the patient's request. The above patient is being seen at the [facility] for treatment of a medical condition.     This pa

## (undated) NOTE — Clinical Note
Date: 2/15/2017    Patient Name: Aidan Workman          To Whom it may concern: This letter has been written at the patient's request. The above patient was seen at the [facility] for treatment of a medical condition.     This patient

## (undated) NOTE — LETTER
3/21/2019          To Whom It May Concern:    Niharika Rodriguez is currently under my medical care. She may return to work at this time but must wear her splint at all times. She cannot lift more than 5 pounds.   She cannot be near any hazardous or fast-mo

## (undated) NOTE — MR AVS SNAPSHOT
After Visit Summary   11/15/2021    Maya Helms   MRN: [id number]           Visit Information     Date & Time  11/15/2021  1:30 PM Provider  Eusebia Trammell, [address] [facility], [facility] Dept COLLECTION [[id number] CUSTOM]     SURGICAL PATHOLOGY TISSUE [[id number] CUSTOM]     THINPREP PAP SMEAR B [[id number] CUSTOM]     THINPREP PAP SMEAR ONLY [[id number] CUSTOM]     Future Labs/Procedures Expected by Expires    HPV HIGH RISK , THIN PREP COLLECTION [[id number] your web-cam enabled computer or mobile device wherever you are. Video Visits cost $50 and can be paid hassle-free using a credit, debit, or health savings card. Not active on [employer]? Ask us how to get signed up today!         If you receive a survey from

## (undated) NOTE — LETTER
Sandra Collazo, :1955    CONSENT FOR PROCEDURE/SEDATION    1. I authorize the performance upon Sandra Collazo  the following: Endometrial biopsy procedure    2.  I authorize Dr. Sunshine Molina MD (and whomever is designated as the ____________________________________________    Witness: _________________________________________ Date:___________     Physician Signature: _______________________________ Date:___________

## (undated) NOTE — LETTER
Date: 3/8/2021    Patient Name: Doug Mccormack          To Whom it may concern: The above patient was seen at the [facility] for treatment of a medical condition.     This patient should be continue her leave of absence until 4/5

## (undated) NOTE — Clinical Note
Date: 3/29/2017    Patient Name: Raul Long          To Whom it may concern: This letter has been written at the patient's request. The above patient was seen at the [facility] for treatment of a medical condition.     This patient

## (undated) NOTE — LETTER
24    Orthopedic Surgery   Pre-Operative Clearance Request    Patient Name:   Blanquita Hernandez             :   1955    Surgeon: Dr. Cano             Date of Surgery: 24    Surgical Procedure: OPEN REDUCTION INTERNAL FIXATION OF RIGHT BIMALLEOLAR ANKLE FRACTURE.       MUST COMPLETE ALL OF THE FOLLOWING 2-3 WEEKS PRIOR TO YOUR SURGERY TO AVOID CANCELLATION, DUE TO THE RULE THEIR WILL BE NO EXCEPTIONS!      [x]  History and Physical        [x]  Medical  Clearance                     Required pre-op testing to be ordered: N/A                       **Please fax test results, H&P, and clearance to [phone] and to P.A.T at [phone]**